# Patient Record
Sex: FEMALE | Race: BLACK OR AFRICAN AMERICAN | NOT HISPANIC OR LATINO | Employment: OTHER | URBAN - METROPOLITAN AREA
[De-identification: names, ages, dates, MRNs, and addresses within clinical notes are randomized per-mention and may not be internally consistent; named-entity substitution may affect disease eponyms.]

---

## 2017-01-10 ENCOUNTER — HOSPITAL ENCOUNTER (EMERGENCY)
Facility: OTHER | Age: 42
Discharge: HOME | End: 2017-01-10
Attending: EMERGENCY MEDICINE
Payer: MEDICARE

## 2017-01-10 VITALS
TEMPERATURE: 98 F | HEART RATE: 73 BPM | HEIGHT: 63 IN | WEIGHT: 136 LBS | SYSTOLIC BLOOD PRESSURE: 135 MMHG | DIASTOLIC BLOOD PRESSURE: 81 MMHG | BODY MASS INDEX: 24.1 KG/M2 | RESPIRATION RATE: 16 BRPM | OXYGEN SATURATION: 98 %

## 2017-01-10 DIAGNOSIS — H53.9 VISUAL CHANGES: Primary | ICD-10-CM

## 2017-01-10 LAB
B-HCG UR QL: NEGATIVE
CTP QC/QA: YES

## 2017-01-10 PROCEDURE — 81025 URINE PREGNANCY TEST: CPT | Performed by: EMERGENCY MEDICINE

## 2017-01-10 PROCEDURE — 99284 EMERGENCY DEPT VISIT MOD MDM: CPT | Mod: 25

## 2017-01-10 NOTE — ED AVS SNAPSHOT
[facility]  [address]  [facility] [address]               Babak Arevalo   1/10/2017  6:10 PM   ED    Description:  Female : 1975   Department:  [facility]           Your Care was Coordinated By:     Provider Role From To    Luanne Teixeira MD Attending Provider 01/10/17 1819 --    Cindy Ayoub PA-C Physician Assistant 01/10/17 1819 --      Reason for Visit     Eye Problem           Diagnoses this Visit        Comments    Visual changes    -  Primary       ED Disposition     None           To Do List           Follow-up Information     Follow up with Ophthamology  In 2 days.        Follow up with [facility].    Specialty:  Emergency Medicine    Why:  If symptoms worsen    Contact information:    [address]  [facility] [address]  [phone]      [facility]     [facility] -  Assistance  Registered nurses in the [facility] provide clinical advisement, health education, appointment booking, and other advisory services.  Call for this free service at [phone].             Medications           Message regarding Medications     Verify the changes and/or additions to your medication regime listed below are the same as discussed with your clinician today.  If any of these changes or additions are incorrect, please notify your healthcare provider.             Verify that the below list of medications is an accurate representation of the medications you are currently taking.  If none reported, the list may be blank. If incorrect, please contact your healthcare provider. Carry this list with you in case of emergency.           Current Medications     aspirin (ECOTRIN) 81 MG EC tablet Take 81 mg by mouth once daily.    hydroxychloroquine (PLAQUENIL) 200 mg tablet Take 200 mg by mouth once daily.    lisinopril 10 MG tablet Take 10 mg by mouth once daily.     "mycophenolate (CELLCEPT) 500 mg Tab Take by mouth 3 (three) times daily.    predniSONE (DELTASONE) 2.5 MG tablet Take 5 mg by mouth once daily.     fluticasone (CUTIVATE) 0.05 % cream Apply topically 2 (two) times daily. On face and neck.    hydrocortisone 1 % cream Apply to affected area 2 times daily    hydrOXYzine HCl (ATARAX) 25 MG tablet Take 1 tablet (25 mg total) by mouth 4 (four) times daily as needed for Itching.           Clinical Reference Information           Your Vitals Were     BP Pulse Temp Resp Height Weight    142/88 (BP Location: Left arm, Patient Position: Sitting) 64 98.5 °F (36.9 °C) (Oral) 17 5' 3" (1.6 m) 61.7 kg (136 lb)    SpO2 BMI             100% 24.09 kg/m2         Allergies as of 1/10/2017     No Known Allergies      Immunizations Administered on Date of Encounter - 1/10/2017     None      ED Micro, Lab, POCT     Start Ordered       Status Ordering Provider    01/10/17 1735 01/10/17 1734  POCT urine pregnancy  Once      Final result       ED Imaging Orders     Start Ordered       Status Ordering Provider    01/10/17 1917 01/10/17 1917  CT Head Without Contrast  1 time imaging      Final result       Discharge References/Attachments     VISION PROBLEMS, UNDERSTANDING (ENGLISH)    EYE, HOW IT WORKS (ENGLISH)      [facility] Sign-Up     Activating your [facility] account is as easy as 1-2-3!     1) Visit [email or web address], select Sign Up Now, enter this activation code and your date of birth, then select Next.  [email or web address]  Expires: 1/19/2017  3:49 PM      2) Create a username and password to use when you visit [facility] in the future and select a security question in case you lose your password and select Next.    3) Enter your e-mail address and click Sign Up!    Additional Information  If you have questions, please e-mail [email or web address] or call [phone] to talk to our [facility] staff. Remember, [facility] is NOT to be used for urgent needs. For medical emergencies, dial 911. "          [facility] complies with applicable Federal civil rights laws and does not discriminate on the basis of race, color, national origin, age, disability, or sex.        Language Assistance Services     ATTENTION: Language assistance services are available, free of charge. Please call [phone].      ATENCIÓN: Si habla español, tiene a trent disposición servicios gratuitos de asistencia lingüística. Llame al [phone].     CHÚ Ý: N?u b?n nói Ti?ng Vi?t, có các d?ch v? h? tr? ngôn ng? mi?n phí dành cho b?n. G?i s? [phone].

## 2017-01-11 NOTE — ED PROVIDER NOTES
"Encounter Date: 1/10/2017       History     Chief Complaint   Patient presents with    Eye Problem     pt states she is seeing spots and stars for a week straight. blurred vision. denies any other symptoms.      Review of patient's allergies indicates:  No Known Allergies  HPI Comments: Patient is a 41 y.o. female with a past medical history of HTN, SLE, presenting to the emergency department with complaints of vision changes.  The patient reports that she is "seeing stars all over for 2 weeks".  The patient reports that she sees stars in all of her vision, stating that there is no difference between her peripheral or central vision.  She denies recent head trauma or injury.  She denies pain with extraocular movement, sensitivity to light, headache.  She denies fever, chills, nausea, vomiting.  She denies use of contacts or glasses.  She states she has not taken any medication for symptoms thus far. She denies foreign body, denies pain.     The history is provided by the patient.     Past Medical History   Diagnosis Date    Disorder of kidney and ureter     Hypertension     SLE (systemic lupus erythematosus)      No past medical history pertinent negatives.  Past Surgical History   Procedure Laterality Date    Renal biopsy       History reviewed. No pertinent family history.  Social History   Substance Use Topics    Smoking status: Never Smoker    Smokeless tobacco: None    Alcohol use No     Review of Systems   Constitutional: Negative for activity change, appetite change, chills, fatigue and fever.   HENT: Negative for congestion, rhinorrhea, sinus pressure, sneezing, sore throat and trouble swallowing.    Eyes: Positive for visual disturbance. Negative for photophobia.   Respiratory: Negative for cough, chest tightness, shortness of breath and wheezing.    Cardiovascular: Negative for chest pain and palpitations.   Gastrointestinal: Negative for abdominal pain, constipation, diarrhea, nausea and vomiting. "   Genitourinary: Negative for dysuria, hematuria and urgency.   Musculoskeletal: Negative for back pain, myalgias, neck pain and neck stiffness.   Skin: Negative for color change and wound.   Neurological: Negative for dizziness, weakness, light-headedness, numbness and headaches.   Psychiatric/Behavioral: Negative for agitation and confusion.       Physical Exam   Initial Vitals   BP Pulse Resp Temp SpO2   01/10/17 1732 01/10/17 1732 01/10/17 1732 01/10/17 1732 01/10/17 1732   142/88 64 17 98.5 °F (36.9 °C) 100 %     Physical Exam    Nursing note and vitals reviewed.  Constitutional: She appears well-developed and well-nourished. She is not diaphoretic. She is cooperative.  Non-toxic appearance. She does not have a sickly appearance. She does not appear ill. No distress.   Well appearing, -American female unaccompanied in the emergency department.  She is smiling and laughing on exam, lying comfortably on the exam table.  She appears to be in no acute distress.   HENT:   Head: Normocephalic and atraumatic.   Right Ear: External ear normal.   Left Ear: External ear normal.   Nose: Nose normal.   Mouth/Throat: Oropharynx is clear and moist.   Eyes: Conjunctivae, EOM and lids are normal. Pupils are equal, round, and reactive to light.   Normal visual field, normal visual exam. No surrounding tenderness to palpation of the bilateral orbit, no ecchymosis, drainage, conjunctivitis.   Neck: Normal range of motion. Neck supple.   Cardiovascular: Normal rate, regular rhythm and normal heart sounds.   Pulmonary/Chest: Breath sounds normal. No respiratory distress. She has no wheezes. She has no rhonchi. She has no rales.   Abdominal: Soft. Bowel sounds are normal. She exhibits no distension. There is no tenderness. There is no rebound and no guarding.   Musculoskeletal: Normal range of motion.   Neurological: She is alert and oriented to person, place, and time.   Skin: Skin is warm.   Psychiatric: She has a normal  mood and affect. Her behavior is normal. Judgment and thought content normal.         ED Course   Procedures  Labs Reviewed   POCT URINE PREGNANCY        Imaging Results         CT Head Without Contrast (Final result) Result time:  01/10/17 19:52:20    Final result by Aiden Gomes MD (01/10/17 19:52:20)    Impression:       No acute intracranial abnormality.    Encephalomalacia of portions of the left frontal and parietal lobes with compensatory left lateral ventricular dilatation. This may be sequelae of remote infarct or trauma.      Electronically signed by: AIDEN GOMES MD  Date:     01/10/17  Time:    19:52     Narrative:    CT head without contrast    Clinical indication: Visual changes    Comparison: None available    Technique: 5-mm axial images of the head were performed without the administration of contrast.  Sagittal and coronal reformatted images were also obtained.    Findings:  No acute intracranial blood products.   No evidence of parenchymal mass.  No extra-axial masses or abnormal fluid collections are noted.    There is encephalomalacia of the posterior left frontal and left parietal lobes with compensatory dilatation of the left lateral ventricle. Few calcifications are noted within this region of encephalomalacia.    The visualized paranasal sinuses and mastoid air cells are clear.  The osseous structures show no evidence of fracture.  The surrounding soft tissues are unremarkable.                Medical Decision Making:   Clinical Tests:   Lab Tests: Ordered and Reviewed  The following lab test(s) were unremarkable: UPT  Radiological Study: Ordered and Reviewed  Other:   I have discussed this case with another health care provider.       <> Summary of the Discussion: Dr. Teixeira  This note was created using Dragon Medical Dictation. There may be typographical errors secondary to dictation.     Urgent evaluation of a 41 y.o. female with a past medical history of lupus, presenting to  "the emergency department complaining of vision changes. Patient is afebrile, nontoxic appearing and hemodynamically stable.  Physical exam reveals regular rate and rhythm, lungs are clear to auscultation bilaterally.  Normal extraocular movements, PERRLA.  No tenderness palpation, overlying skin changes of the bilateral periorbital region.  Visual acuity is assessed and is normal.  At this time, I do feel that the patient is stable for discharge home. Upon discussing this with her, she responded with, "well you should probably x-ray my head because if I leave and die, I'm gonna say it's your fault". Will obtain CT.   CT of the head shows no acute intracranial abnormalities.  Sequela of previous infarct seen, consistent with the patient's reported history of a previous CVA.  At this time, the patient stable for discharge.  I did recommend that she obtain follow-up with her ophthalmologist for further evaluation and management. The patient was instructed to follow up with a primary care provider in 2 days or to return to the emergency department for worsening symptoms. The treatment plan was discussed with the patient who demonstrated understanding and comfort with plan. The patient's history, physical exam, and plan of care was discussed with and agreed upon with my supervising physician.     Past Medical History   Diagnosis Date    Disorder of kidney and ureter     Hypertension     SLE (systemic lupus erythematosus)                      ED Course     Clinical Impression:     1. Visual changes       Disposition:   Disposition: Discharged  Condition: Stable       Cindy Ayoub PA-C  01/10/17 2243    "

## 2017-01-11 NOTE — ED TRIAGE NOTES
Patient c/o bilateral blurred vision with spots for several days.  Patient stated she has had episodes of feeling weak.

## 2017-01-22 ENCOUNTER — HOSPITAL ENCOUNTER (EMERGENCY)
Facility: OTHER | Age: 42
Discharge: HOME | End: 2017-01-22
Attending: EMERGENCY MEDICINE
Payer: MEDICARE

## 2017-01-22 VITALS
TEMPERATURE: 99 F | OXYGEN SATURATION: 98 % | SYSTOLIC BLOOD PRESSURE: 184 MMHG | DIASTOLIC BLOOD PRESSURE: 92 MMHG | HEIGHT: 63 IN | WEIGHT: 132 LBS | RESPIRATION RATE: 18 BRPM | HEART RATE: 85 BPM | BODY MASS INDEX: 23.39 KG/M2

## 2017-01-22 DIAGNOSIS — J06.9 VIRAL URI WITH COUGH: Primary | ICD-10-CM

## 2017-01-22 PROCEDURE — 99283 EMERGENCY DEPT VISIT LOW MDM: CPT

## 2017-01-22 RX ORDER — CETIRIZINE HYDROCHLORIDE 10 MG/1
10 TABLET ORAL DAILY
Qty: 30 TABLET | Refills: 0 | Status: ON HOLD | OUTPATIENT
Start: 2017-01-22 | End: 2021-04-03 | Stop reason: HOSPADM

## 2017-01-22 RX ORDER — FLUTICASONE PROPIONATE 50 MCG
2 SPRAY, SUSPENSION (ML) NASAL ONCE
Status: DISCONTINUED | OUTPATIENT
Start: 2017-01-22 | End: 2017-01-22

## 2017-01-22 RX ORDER — CETIRIZINE HYDROCHLORIDE 10 MG/1
10 TABLET ORAL ONCE
Status: DISCONTINUED | OUTPATIENT
Start: 2017-01-22 | End: 2017-01-22

## 2017-01-22 RX ORDER — FLUTICASONE PROPIONATE 50 MCG
1 SPRAY, SUSPENSION (ML) NASAL 2 TIMES DAILY PRN
Qty: 15 G | Refills: 0 | Status: ON HOLD | OUTPATIENT
Start: 2017-01-22 | End: 2021-04-03 | Stop reason: HOSPADM

## 2017-01-22 NOTE — ED AVS SNAPSHOT
[facility]  [address]  [place] [address]               Babak Arevalo   2017 10:09 AM   ED    Description:  Female : 1975   Department:  [facility]           Your Care was Coordinated By:     Provider Role From To    Odessa Benjamin MD Attending Provider 17 1626 --    Nara Goldsmith PA-C Physician Assistant 17 6947 --      Reason for Visit     Nasal Congestion           Diagnoses this Visit        Comments    Viral URI with cough    -  Primary       ED Disposition     None           To Do List           Follow-up Information     Follow up with [facility] In 2 days.    Why:  For symptom re-check.     Contact information:    [address]  [place] [address]  [phone]         These Medications        Disp Refills Start End    fluticasone (FLONASE) 50 mcg/actuation nasal spray 15 g 0 2017     1 spray by Each Nare route 2 (two) times daily as needed. - Each Nare    Pharmacy: [facility] - [address] Ph #: [phone]       benzocaine-menthol (CEPACOL SENSATIONS COOLING) 4-9 mg Lozg 20 lozenge 0 2017     1 lozenge by Mucous Membrane route 4 (four) times daily as needed. - Mucous Membrane    Pharmacy: [facility] - [address] Ph #: [phone]       cetirizine (ZYRTEC) 10 MG tablet 30 tablet 0 2017    Take 1 tablet (10 mg total) by mouth once daily. - Oral    [facility] [facility] [address] Ph #: [phone]         [facility]     [facility] Care [facility] -  Assistance  Registered nurses in the [facility] provide clinical advisement, health education, appointment booking, and other advisory services.  Call for this free service at [phone].             Medications           Message regarding  Medications     Verify the changes and/or additions to your medication regime listed below are the same as discussed with your clinician today.  If any of these changes or additions are incorrect, please notify your healthcare provider.        START taking these NEW medications        Refills    fluticasone (FLONASE) 50 mcg/actuation nasal spray 0    Si spray by Each Nare route 2 (two) times daily as needed.    Class: Print    Route: Each Nare    benzocaine-menthol (CEPACOL SENSATIONS COOLING) 4-9 mg Lozg 0    Si lozenge by Mucous Membrane route 4 (four) times daily as needed.    Class: Print    Route: Mucous Membrane    cetirizine (ZYRTEC) 10 MG tablet 0    Sig: Take 1 tablet (10 mg total) by mouth once daily.    Class: Print    Route: Oral           Verify that the below list of medications is an accurate representation of the medications you are currently taking.  If none reported, the list may be blank. If incorrect, please contact your healthcare provider. Carry this list with you in case of emergency.           Current Medications     aspirin (ECOTRIN) 81 MG EC tablet Take 81 mg by mouth once daily.    fluticasone (CUTIVATE) 0.05 % cream Apply topically 2 (two) times daily. On face and neck.    hydroxychloroquine (PLAQUENIL) 200 mg tablet Take 200 mg by mouth once daily.    hydrOXYzine HCl (ATARAX) 25 MG tablet Take 1 tablet (25 mg total) by mouth 4 (four) times daily as needed for Itching.    lisinopril 10 MG tablet Take 10 mg by mouth once daily.    mycophenolate (CELLCEPT) 500 mg Tab Take by mouth 3 (three) times daily.    predniSONE (DELTASONE) 2.5 MG tablet Take 5 mg by mouth once daily.     benzocaine-menthol (CEPACOL SENSATIONS COOLING) 4-9 mg Lozg 1 lozenge by Mucous Membrane route 4 (four) times daily as needed.    cetirizine (ZYRTEC) 10 MG tablet Take 1 tablet (10 mg total) by mouth once daily.    fluticasone (FLONASE) 50 mcg/actuation nasal spray 1 spray by Each Nare route 2 (two) times  "daily as needed.    hydrocortisone 1 % cream Apply to affected area 2 times daily           Clinical Reference Information           Your Vitals Were     BP Pulse Temp Resp Height Weight    184/92 (BP Location: Left arm, Patient Position: Sitting) 85 99 °F (37.2 °C) (Oral) 18 5' 3" (1.6 m) 59.9 kg (132 lb)    Last Period SpO2 BMI          01/17/2017 98% 23.38 kg/m2        Allergies as of 1/22/2017     No Known Allergies      Immunizations Administered on Date of Encounter - 1/22/2017     None      ED Micro, Lab, POCT     None      ED Imaging Orders     None        Discharge Instructions         Viral Upper Respiratory Illness (Adult)  You have a viral upper respiratory illness (URI), which is another term for the common cold. This illness is contagious during the first few days. It is spread through the air by coughing and sneezing. It may also be spread by direct contact (touching the sick person and then touching your own eyes, nose, or mouth). Frequent handwashing will decrease risk of spread. Most viral illnesses go away within 7 to 10 days with rest and simple home remedies. Sometimes the illness may last for several weeks. Antibiotics will not kill a virus, and they are generally not prescribed for this condition.    Home care  · If symptoms are severe, rest at home for the first 2 to 3 days. When you resume activity, don't let yourself get too tired.  · Avoid being exposed to cigarette smoke (yours or others).  · You may use acetaminophen or ibuprofen to control pain and fever, unless another medicine was prescribed. (Note: If you have chronic liver or kidney disease, have ever had a stomach ulcer or gastrointestinal bleeding, or are taking blood-thinning medicines, talk with your healthcare provider before using these medicines.) Aspirin should never be given to anyone under 18 years of age who is ill with a viral infection or fever. It may cause severe liver or brain damage.  · Your appetite may be poor, " so a light diet is fine. Avoid dehydration by drinking 6 to 8 glasses of fluids per day (water, soft drinks, juices, tea, or soup). Extra fluids will help loosen secretions in the nose and lungs.  · Over-the-counter cold medicines will not shorten the length of time youre sick, but they may be helpful for the following symptoms: cough, sore throat, and nasal and sinus congestion. (Note: Do not use decongestants if you have high blood pressure.)  Follow-up care  Follow up with your healthcare provider, or as advised.  When to seek medical advice  Call your healthcare provider right away if any of these occur:  · Cough with lots of colored sputum (mucus)  · Severe headache; face, neck, or ear pain  · Difficulty swallowing due to throat pain  · Fever of 100.4°F (38°C)  Call 911, or get immediate medical care  Call emergency services right away if any of these occur:  · Chest pain, shortness of breath, wheezing, or difficulty breathing  · Coughing up blood  · Inability to swallow due to throat pain  © 2745-3069 [employer]. [address] [place] [address]. All rights reserved. This information is not intended as a substitute for professional medical care. Always follow your healthcare professional's instructions.          [facility] Sign-Up     Activating your [facility] account is as easy as 1-2-3!     1) Visit [email or web address], select Sign Up Now, enter this activation code and your date of birth, then select Next.  [email or web address]  Expires: 3/8/2017 10:40 AM      2) Create a username and password to use when you visit [facility] in the future and select a security question in case you lose your password and select Next.    3) Enter your e-mail address and click Sign Up!    Additional Information  If you have questions, please e-mail [email or web address] or call [phone] to talk to our [facility] staff. Remember, [facility] is NOT to be used for urgent needs. For medical emergencies, dial 911.           [facility] complies with applicable Federal civil rights laws and does not discriminate on the basis of race, color, national origin, age, disability, or sex.        Language Assistance Services     ATTENTION: Language assistance services are available, free of charge. Please call [phone].      ATENCIÓN: Si habla español, tiene a trent disposición servicios gratuitos de asistencia lingüística. Llame al [phone].     CHÚ Ý: N?u b?n nói Ti?ng Vi?t, có các d?ch v? h? tr? ngôn ng? mi?n phí dành cho b?n. G?i s? [phone].

## 2017-01-22 NOTE — DISCHARGE INSTRUCTIONS
Viral Upper Respiratory Illness (Adult)  You have a viral upper respiratory illness (URI), which is another term for the common cold. This illness is contagious during the first few days. It is spread through the air by coughing and sneezing. It may also be spread by direct contact (touching the sick person and then touching your own eyes, nose, or mouth). Frequent handwashing will decrease risk of spread. Most viral illnesses go away within 7 to 10 days with rest and simple home remedies. Sometimes the illness may last for several weeks. Antibiotics will not kill a virus, and they are generally not prescribed for this condition.    Home care  · If symptoms are severe, rest at home for the first 2 to 3 days. When you resume activity, don't let yourself get too tired.  · Avoid being exposed to cigarette smoke (yours or others).  · You may use acetaminophen or ibuprofen to control pain and fever, unless another medicine was prescribed. (Note: If you have chronic liver or kidney disease, have ever had a stomach ulcer or gastrointestinal bleeding, or are taking blood-thinning medicines, talk with your healthcare provider before using these medicines.) Aspirin should never be given to anyone under 18 years of age who is ill with a viral infection or fever. It may cause severe liver or brain damage.  · Your appetite may be poor, so a light diet is fine. Avoid dehydration by drinking 6 to 8 glasses of fluids per day (water, soft drinks, juices, tea, or soup). Extra fluids will help loosen secretions in the nose and lungs.  · Over-the-counter cold medicines will not shorten the length of time youre sick, but they may be helpful for the following symptoms: cough, sore throat, and nasal and sinus congestion. (Note: Do not use decongestants if you have high blood pressure.)  Follow-up care  Follow up with your healthcare provider, or as advised.  When to seek medical advice  Call your healthcare provider right away if any  of these occur:  · Cough with lots of colored sputum (mucus)  · Severe headache; face, neck, or ear pain  · Difficulty swallowing due to throat pain  · Fever of 100.4°F (38°C)  Call 911, or get immediate medical care  Call emergency services right away if any of these occur:  · Chest pain, shortness of breath, wheezing, or difficulty breathing  · Coughing up blood  · Inability to swallow due to throat pain  © 1884-8381 [employer]. [address] [place] [address], [place] [address]. All rights reserved. This information is not intended as a substitute for professional medical care. Always follow your healthcare professional's instructions.

## 2017-01-22 NOTE — ED PROVIDER NOTES
Encounter Date: 1/22/2017       History     Chief Complaint   Patient presents with    Nasal Congestion     pt reports nasal congestion that has been going on for the past 2 weeks; productive cough with yellow sputum; sneezing     Review of patient's allergies indicates:  No Known Allergies  HPI Comments: Patient is 41-year-old female history of lupus who presents with complaint of nasal congestion, sore throat, productive cough that is been present for approximately 2 weeks prior to arrival.  Patient reports no relief from home Robitussin and NyQuil.  She has not been taking any other medications to help with symptoms.  She denies fevers, chest pain, shortness of breath, dizziness, altered mental status, sick contacts or recent travel.  She is currently unaccompanied in the ear.    The history is provided by the patient.     Past Medical History   Diagnosis Date    Disorder of kidney and ureter     Hypertension     SLE (systemic lupus erythematosus)      No past medical history pertinent negatives.  Past Surgical History   Procedure Laterality Date    Renal biopsy       History reviewed. No pertinent family history.  Social History   Substance Use Topics    Smoking status: Never Smoker    Smokeless tobacco: None    Alcohol use No     Review of Systems   Constitutional: Negative for chills and fever.   HENT: Positive for congestion and sore throat. Negative for trouble swallowing.    Eyes: Negative for visual disturbance.   Respiratory: Positive for cough. Negative for shortness of breath.    Cardiovascular: Negative for chest pain.   Gastrointestinal: Negative for abdominal pain, constipation, diarrhea, nausea and vomiting.   Genitourinary: Negative for dysuria and flank pain.   Musculoskeletal: Negative for back pain, neck pain and neck stiffness.   Skin: Negative for rash.   Neurological: Negative for dizziness, syncope, weakness and headaches.   Psychiatric/Behavioral: Negative for confusion.        Physical Exam   Initial Vitals   BP Pulse Resp Temp SpO2   01/22/17 1005 01/22/17 1005 01/22/17 1005 01/22/17 1005 01/22/17 1005   184/92 85 18 99 °F (37.2 °C) 98 %     Physical Exam    Vitals reviewed.  Constitutional: She appears well-developed and well-nourished. She is not diaphoretic. No distress.   Healthy-appearing -American female in no acute distress her apparent pain.  She speaks in clear full sentences, found safely congested and speaking, makes good eye contact and is cooperative.  There is no coughing during interview or exam.   HENT:   Head: Normocephalic and atraumatic.   Eyes: Conjunctivae and EOM are normal. Pupils are equal, round, and reactive to light. Right eye exhibits no discharge. Left eye exhibits no discharge. No scleral icterus.   Neck: Normal range of motion. Neck supple.   Cardiovascular: Normal rate, regular rhythm and normal heart sounds. Exam reveals no gallop and no friction rub.    No murmur heard.  Pulmonary/Chest: Breath sounds normal. She has no wheezes. She has no rhonchi. She has no rales.   Clear lungs auscultation bilaterally   Abdominal: Soft. Bowel sounds are normal. There is no tenderness. There is no rebound and no guarding.   Musculoskeletal: Normal range of motion. She exhibits no edema or tenderness.   Lymphadenopathy:     She has no cervical adenopathy.   Neurological: She is alert and oriented to person, place, and time. She has normal strength. No cranial nerve deficit or sensory deficit.   Skin: Skin is warm and dry. No rash and no abscess noted. No erythema. No pallor.   Psychiatric: She has a normal mood and affect. Her behavior is normal. Thought content normal.         ED Course   Procedures  Labs Reviewed - No data to display          Medical Decision Making:   ED Management:  Urgent evaluation of 41-year-old female who presents with complaints most consistent with viral URI and rhinitis.  Patient is afebrile, nontoxic appearing, hemodynamically  stable.  Physical exam reveals clear nasal mucus in bilateral nares with no significant findings for bacterial HEENT infection including acute otitis media, peritonsillar abscess, retropharyngeal abscess, strep pharyngitis, meningitis.  Also, do not suspect acute bacterial sinusitis.  No antibiotics are warranted at this time.  Patient is not currently optimized on over-the-counter symptom management.  Will encourage use of Cepacol, menthol lozenges, zyrtec.  Encouraged to push fluids, rest, follow-up with primary care provider in 1-2 days for symptom recheck.  She is amenable to plan.  Case discussed with attending who agrees with plan.                   ED Course     Clinical Impression:   The encounter diagnosis was Viral URI with cough.          Nara Goldsmith PA-C  01/22/17 105

## 2017-05-03 ENCOUNTER — HOSPITAL ENCOUNTER (EMERGENCY)
Facility: OTHER | Age: 42
Discharge: HOME | End: 2017-05-03
Attending: EMERGENCY MEDICINE
Payer: COMMERCIAL

## 2017-05-03 VITALS
HEART RATE: 101 BPM | WEIGHT: 132 LBS | OXYGEN SATURATION: 99 % | SYSTOLIC BLOOD PRESSURE: 162 MMHG | TEMPERATURE: 99 F | HEIGHT: 63 IN | DIASTOLIC BLOOD PRESSURE: 97 MMHG | BODY MASS INDEX: 23.39 KG/M2 | RESPIRATION RATE: 18 BRPM

## 2017-05-03 DIAGNOSIS — R42 VERTIGO: Primary | ICD-10-CM

## 2017-05-03 DIAGNOSIS — I10 ESSENTIAL HYPERTENSION: ICD-10-CM

## 2017-05-03 LAB
B-HCG UR QL: NEGATIVE
CTP QC/QA: YES

## 2017-05-03 PROCEDURE — 99284 EMERGENCY DEPT VISIT MOD MDM: CPT | Mod: 25

## 2017-05-03 PROCEDURE — 81025 URINE PREGNANCY TEST: CPT | Performed by: EMERGENCY MEDICINE

## 2017-05-03 RX ORDER — ONDANSETRON 4 MG/1
4 TABLET, FILM COATED ORAL EVERY 6 HOURS
Qty: 21 TABLET | Refills: 0 | Status: ON HOLD | OUTPATIENT
Start: 2017-05-03 | End: 2021-04-03 | Stop reason: HOSPADM

## 2017-05-03 NOTE — ED PROVIDER NOTES
"Encounter Date: 5/3/2017    SCRIBE #1 NOTE: I, Ellemile Tse, am scribing for, and in the presence of, Dr. Benjamin.       History     Chief Complaint   Patient presents with    Dizziness     Pt Co intermittent dizziness since March.     Review of patient's allergies indicates:  No Known Allergies  HPI Comments: Time seen by provider: 5:07 PM    This is a 41 y.o. female who presents with complaint of intermittent dizziness that has persisted for the past 2 months.  The patient states that the episodes have gradually increased in frequency and are nearly every day now.  She describes her discomfort as a "room-spinning" sensation.  She also endorses hot flashes, tinnitus, and nausea.  She mentions no fever, chills, congestion, rhinorrhea, vomiting, or HA.  The patient states that her symptoms are not exacerbated by sudden movements.  She reports no other identifying, alleviating, or exacerbating factors.  She admits that she has been seen at this facility and [facility] for the same complaint.  The patient claims that she was prescribed medication in March, which has provided no relief.  She reports history of HTN, CVA, and SLE.  She claims that she does not take prescription blood thinners.  She states that she has not seen a neurologist.  The patient admits that she does not have a PCP.       The history is provided by the patient.     Past Medical History:   Diagnosis Date    Disorder of kidney and ureter     Hypertension     SLE (systemic lupus erythematosus)      Past Surgical History:   Procedure Laterality Date    RENAL BIOPSY       History reviewed. No pertinent family history.  Social History   Substance Use Topics    Smoking status: Never Smoker    Smokeless tobacco: None    Alcohol use No     Review of Systems   Constitutional: Negative for activity change, appetite change, chills, diaphoresis and fever.        Positive for hot flashes.   HENT: Positive for tinnitus. Negative for congestion, sore throat " and trouble swallowing.    Eyes: Negative for photophobia and visual disturbance.   Respiratory: Negative for cough, chest tightness and shortness of breath.    Cardiovascular: Negative for chest pain.   Gastrointestinal: Positive for nausea. Negative for abdominal pain and vomiting.   Endocrine: Negative for polydipsia and polyuria.   Genitourinary: Negative for difficulty urinating and flank pain.   Musculoskeletal: Negative for back pain and neck pain.   Skin: Negative for rash.   Neurological: Positive for dizziness. Negative for weakness and headaches.   Psychiatric/Behavioral: Negative for confusion.       Physical Exam   Initial Vitals   BP Pulse Resp Temp SpO2   05/03/17 1628 05/03/17 1628 05/03/17 1628 05/03/17 1628 05/03/17 1628   162/97 101 18 98.9 °F (37.2 °C) 99 %     Physical Exam    Nursing note and vitals reviewed.  Constitutional: She appears well-developed and well-nourished. She is cooperative.  Non-toxic appearance. No distress.   HENT:   Head: Normocephalic and atraumatic.   Right Ear: Tympanic membrane normal.   Left Ear: Tympanic membrane normal.   Mouth/Throat: Oropharynx is clear and moist.   TM normal bilaterally.   Eyes: Conjunctivae and EOM are normal. Pupils are equal, round, and reactive to light.   No nystagmus on lateral gaze.   Neck: Normal range of motion and full passive range of motion without pain. Neck supple. No thyromegaly present. No JVD present.   Cardiovascular: Normal rate, regular rhythm, normal heart sounds and normal pulses.   Pulmonary/Chest: Effort normal and breath sounds normal. No respiratory distress.   Abdominal: Soft. Normal appearance and bowel sounds are normal. She exhibits no distension and no mass. There is no tenderness.   Musculoskeletal: Normal range of motion.   Neurological: She is alert and oriented to person, place, and time. She has normal strength. No cranial nerve deficit or sensory deficit.   CN II-XII intact. No facial droop. Symptoms elicited  with Shilpi-Hallpike test.   Skin: Skin is warm, dry and intact. No rash noted.   Psychiatric: She has a normal mood and affect. Her speech is normal and behavior is normal. Judgment and thought content normal.         ED Course   Procedures  Labs Reviewed   POCT URINE PREGNANCY     Imaging Results     None                  Medical Decision Making:   Initial Assessment:   Urgent evaluation of 41-year-old female with Lupus- on chronic steroids, prior CVA- on asa here with complaint of vertigo x months.  Patient endorses being evaluated here, as well as [facility] with diagnosis of bppv/labrinthitis given meclizine, which improves symptoms that then return after wearing off.  Patient denies head trauma, no fevers, no recent illness.  Patient endorses associated nausea, without vomiting.  Suspect patient's symptoms are either peripheral, or subacute central pathology to posterior circulation. Epley maneuver attempted with mild relief.  Given prolonged symptoms, we'll encourage follow-up with neurology for MRI and dc with additional antiemetics. Also discussed need for fu PMD given elevated bp, but not consistent with acute encephalopathy.  Clinical Tests:   Lab Tests: Ordered and Reviewed            Scribe Attestation:   Scribe #1: I performed the above scribed service and the documentation accurately describes the services I performed. I attest to the accuracy of the note.    Attending Attestation:           Physician Attestation for Scribe:  Physician Attestation Statement for Scribe #1: I, Dr. Benjamin, reviewed documentation, as scribed by Elle Tse in my presence, and it is both accurate and complete.                 ED Course     Clinical Impression:     1. Vertigo    2. Essential hypertension          Disposition:   Disposition: Discharged  Condition: Corrie Benjamin MD  05/03/17 5224

## 2017-05-03 NOTE — ED AVS SNAPSHOT
[facility]  [address]  [facility] [address]               Babak Arevalo   5/3/2017  4:32 PM   ED    Description:  Female : 1975   Department:  [facility]           Your Care was Coordinated By:     Provider Role From To    Odessa Benjamin MD Attending Provider 17 [id number] --      Reason for Visit     Dizziness           Diagnoses this Visit        Comments    Vertigo    -  Primary     Essential hypertension           ED Disposition     ED Disposition Condition Comment    Discharge             To Do List           Follow-up Information     Schedule an appointment as soon as possible for a visit with Dennis Lawler MD.    Specialty:  Neurology    Contact information:    [address]  [address]  [facility] [address]  [phone]         These Medications        Disp Refills Start End    ondansetron (ZOFRAN) 4 MG tablet 21 tablet 0 5/3/2017     Take 1 tablet (4 mg total) by mouth every 6 (six) hours. - Oral    Pharmacy: [facility] [address] - [address] #: [phone]         [facility]     [facility] Nurse Care Line -  Assistance  Unless otherwise directed by your provider, please contact [facility], our nurse care line that is available for  assistance.     Registered nurses in the [facility] provide: appointment scheduling, clinical advisement, health education, and other advisory services.  Call: [phone] (toll free)               Medications           Message regarding Medications     Verify the changes and/or additions to your medication regime listed below are the same as discussed with your clinician today.  If any of these changes or additions are incorrect, please notify your healthcare provider.        START taking these NEW medications        Refills    ondansetron (ZOFRAN) 4 MG tablet 0    Sig: Take 1 tablet (4 mg total) by mouth  "every 6 (six) hours.    Class: Print    Route: Oral           Verify that the below list of medications is an accurate representation of the medications you are currently taking.  If none reported, the list may be blank. If incorrect, please contact your healthcare provider. Carry this list with you in case of emergency.           Current Medications     aspirin (ECOTRIN) 81 MG EC tablet Take 81 mg by mouth once daily.    benzocaine-menthol (CEPACOL SENSATIONS COOLING) 4-9 mg Lozg 1 lozenge by Mucous Membrane route 4 (four) times daily as needed.    cetirizine (ZYRTEC) 10 MG tablet Take 1 tablet (10 mg total) by mouth once daily.    fluticasone (CUTIVATE) 0.05 % cream Apply topically 2 (two) times daily. On face and neck.    fluticasone (FLONASE) 50 mcg/actuation nasal spray 1 spray by Each Nare route 2 (two) times daily as needed.    hydrocortisone 1 % cream Apply to affected area 2 times daily    hydroxychloroquine (PLAQUENIL) 200 mg tablet Take 200 mg by mouth once daily.    hydrOXYzine HCl (ATARAX) 25 MG tablet Take 1 tablet (25 mg total) by mouth 4 (four) times daily as needed for Itching.    lisinopril 10 MG tablet Take 10 mg by mouth once daily.    mycophenolate (CELLCEPT) 500 mg Tab Take by mouth 3 (three) times daily.    ondansetron (ZOFRAN) 4 MG tablet Take 1 tablet (4 mg total) by mouth every 6 (six) hours.    predniSONE (DELTASONE) 2.5 MG tablet Take 5 mg by mouth once daily.            Clinical Reference Information           Your Vitals Were     BP Pulse Temp Resp Height Weight    162/97 (BP Location: Left arm, Patient Position: Sitting) 101 98.9 °F (37.2 °C) (Oral) 18 5' 3" (1.6 m) 59.9 kg (132 lb)    Last Period SpO2 BMI          04/03/2017 99% 23.38 kg/m2        Allergies as of 5/3/2017     No Known Allergies      Immunizations Administered on Date of Encounter - 5/3/2017     None      ED Micro, Lab, POCT     Start Ordered       Status Ordering Provider    05/03/17 1649 05/03/17 1648  POCT urine " pregnancy  Once      Final result       ED Imaging Orders     None      Discharge References/Attachments     EAR, INNER: CAUSES OF DIZZINESS (VERTIGO) (ENGLISH)    VERTIGO, UNSPECIFIED (ENGLISH)      [email or web address] Sign-Up     Activating your [facility] account is as easy as 1-2-3!     1) Visit [email or web address], select Sign Up Now, enter this activation code and your date of birth, then select Next.  [email or web address]  Expires: 6/17/2017  5:19 PM      2) Create a username and password to use when you visit [facility] in the future and select a security question in case you lose your password and select Next.    3) Enter your e-mail address and click Sign Up!    Additional Information  If you have questions, please e-mail [email or web address] or call [phone] to talk to our [facility] staff. Remember, [facility] is NOT to be used for urgent needs. For medical emergencies, dial 911.          [facility] complies with applicable Federal civil rights laws and does not discriminate on the basis of race, color, national origin, age, disability, or sex.        Language Assistance Services     ATTENTION: Language assistance services are available, free of charge. Please call [phone].      ATENCIÓN: Si habla español, tiene a trent disposición servicios gratuitos de asistencia lingüística. Llame al [phone].     CHÚ Ý: N?u b?n nói Ti?ng Vi?t, có các d?ch v? h? tr? ngôn ng? mi?n phí dành cho b?n. G?i s? [phone].

## 2017-05-03 NOTE — ED TRIAGE NOTES
"Pt complaining of "spinning" feeling in her head onset states she took a meclizine last night started feeling better but the feeling returned. Pt denies all other complaints.  States after the meds wear off it comes back.  Patient is AAOX x3 RR easy non labored NAD  No urinary complaints no abd complaints no other complaints   "

## 2017-05-05 ENCOUNTER — HOSPITAL ENCOUNTER (EMERGENCY)
Facility: OTHER | Age: 42
Discharge: HOME | End: 2017-05-05
Attending: EMERGENCY MEDICINE
Payer: MEDICARE

## 2017-05-05 VITALS
BODY MASS INDEX: 23.74 KG/M2 | HEART RATE: 88 BPM | HEIGHT: 63 IN | WEIGHT: 134 LBS | SYSTOLIC BLOOD PRESSURE: 143 MMHG | TEMPERATURE: 98 F | RESPIRATION RATE: 18 BRPM | DIASTOLIC BLOOD PRESSURE: 93 MMHG

## 2017-05-05 DIAGNOSIS — J02.9 PHARYNGITIS, UNSPECIFIED ETIOLOGY: Primary | ICD-10-CM

## 2017-05-05 PROCEDURE — 99283 EMERGENCY DEPT VISIT LOW MDM: CPT | Mod: 25

## 2017-05-05 PROCEDURE — 63600175 PHARM REV CODE 636 W HCPCS: Performed by: EMERGENCY MEDICINE

## 2017-05-05 PROCEDURE — 25000003 PHARM REV CODE 250: Performed by: EMERGENCY MEDICINE

## 2017-05-05 PROCEDURE — 96372 THER/PROPH/DIAG INJ SC/IM: CPT

## 2017-05-05 RX ORDER — HYDROCODONE BITARTRATE AND ACETAMINOPHEN 7.5; 325 MG/15ML; MG/15ML
10 SOLUTION ORAL
Status: COMPLETED | OUTPATIENT
Start: 2017-05-05 | End: 2017-05-05

## 2017-05-05 RX ORDER — AMOXICILLIN 500 MG/1
500 CAPSULE ORAL 3 TIMES DAILY
Qty: 21 CAPSULE | Refills: 0 | Status: SHIPPED | OUTPATIENT
Start: 2017-05-05 | End: 2017-05-12

## 2017-05-05 RX ORDER — AMOXICILLIN 250 MG/1
500 CAPSULE ORAL
Status: COMPLETED | OUTPATIENT
Start: 2017-05-05 | End: 2017-05-05

## 2017-05-05 RX ORDER — DEXAMETHASONE SODIUM PHOSPHATE 4 MG/ML
8 INJECTION, SOLUTION INTRA-ARTICULAR; INTRALESIONAL; INTRAMUSCULAR; INTRAVENOUS; SOFT TISSUE
Status: COMPLETED | OUTPATIENT
Start: 2017-05-05 | End: 2017-05-05

## 2017-05-05 RX ADMIN — HYDROCODONE BITARTRATE AND ACETAMINOPHEN 10 ML: 7.5; 325 SOLUTION ORAL at 06:05

## 2017-05-05 RX ADMIN — AMOXICILLIN 500 MG: 250 CAPSULE ORAL at 06:05

## 2017-05-05 RX ADMIN — DEXAMETHASONE SODIUM PHOSPHATE 8 MG: 4 INJECTION, SOLUTION INTRAMUSCULAR; INTRAVENOUS at 06:05

## 2017-05-05 NOTE — ED PROVIDER NOTES
Encounter Date: 5/5/2017    SCRIBE #1 NOTE: I, Alba Negro , am scribing for, and in the presence of, Dr. Underwood .       History     Chief Complaint   Patient presents with    Sore Throat     since yesterday     Review of patient's allergies indicates:  No Known Allergies  HPI Comments: Time seen by provider: 5:56 AM    This is a 41 y.o. female who presents with complaint of sore throat. Symptoms began last night. Onset of symptoms woke the patient up. Pt reports chills, voice change, trouble swallowing, and mild SOB, but denies fever, congestion, rhinorrhea, dental pain, cough, wheezing, nausea, vomiting, abdominal pain, myalgias, or weakness. Sore throat currently rates 9/10. Sore throat worsens with opening the mouth and swallowing. SOB became better with sitting up and is currently resolved. Pt reports daily use of Prednisone (5 mg) for lupus. She was seen in the ED two days ago for dizziness. Pt denies use of alcohol, tobacco, or illicit drugs.     The history is provided by the patient.     Past Medical History:   Diagnosis Date    Disorder of kidney and ureter     Hypertension     SLE (systemic lupus erythematosus)      Past Surgical History:   Procedure Laterality Date    RENAL BIOPSY       History reviewed. No pertinent family history.  Social History   Substance Use Topics    Smoking status: Never Smoker    Smokeless tobacco: None    Alcohol use No     Review of Systems   Constitutional: Positive for chills. Negative for fever.   HENT: Positive for sore throat, trouble swallowing and voice change. Negative for congestion, dental problem and rhinorrhea.    Eyes: Negative for redness and visual disturbance.   Respiratory: Positive for shortness of breath. Negative for cough and wheezing.    Cardiovascular: Negative for chest pain and palpitations.   Gastrointestinal: Negative for abdominal pain, diarrhea, nausea and vomiting.   Genitourinary: Negative for dysuria.   Musculoskeletal: Negative for  back pain and myalgias.   Skin: Negative for rash.   Neurological: Negative for weakness and headaches.   Psychiatric/Behavioral: Negative for confusion.       Physical Exam   Initial Vitals   BP Pulse Resp Temp SpO2   05/05/17 0549 05/05/17 0549 05/05/17 0549 05/05/17 0549 --   143/93 88 18 98.1 °F (36.7 °C)      Physical Exam    Nursing note and vitals reviewed.  Constitutional: She appears well-developed and well-nourished. She is not diaphoretic. She appears distressed.   HENT:   Head: Normocephalic and atraumatic.   Right Ear: External ear normal.   Left Ear: External ear normal.   Mouth/Throat: Uvula is midline. Posterior oropharyngeal erythema present.   Tonsils are slightly enlarged (right greater than left).    Eyes: Conjunctivae and EOM are normal. Pupils are equal, round, and reactive to light. Right eye exhibits no discharge. Left eye exhibits no discharge. No scleral icterus.   Neck: Normal range of motion. Neck supple.   Cardiovascular: Regular rhythm, normal heart sounds and intact distal pulses. Tachycardia present.  Exam reveals no gallop and no friction rub.    No murmur heard.  Pulmonary/Chest: Breath sounds normal. No stridor. No respiratory distress. She has no wheezes. She has no rhonchi. She has no rales.   Abdominal: Soft. She exhibits no distension. There is no tenderness. There is no rebound and no guarding.   Musculoskeletal: Normal range of motion. She exhibits no edema or tenderness.   Lymphadenopathy:     She has cervical adenopathy (bilaterally).   Neurological: She is alert and oriented to person, place, and time. She has normal strength. No cranial nerve deficit.   Skin: Skin is warm and dry. No rash noted. No erythema. No pallor.   Psychiatric: She has a normal mood and affect. Her behavior is normal. Judgment and thought content normal.         ED Course   Procedures  Labs Reviewed - No data to display          Medical Decision Making:   ED Management:  Emergent evaluation of  41-year-old female with complaint of sore throat times a few hours.  Vital signs reveal mild hypertension of which she has a history, afebrile.  Physical exam reveals erythema of the posterior oropharynx and some tonsillar enlargement, right side is slightly larger than the left, but uvula is midline and no hot potato voice or fever.  I do not think that there is a drainable peritonsillar abscess.  We'll treat as strep throat with amoxicillin as well as Decadron, she was given hydrocodone syrup in the emergency room for pain.  She is discharged in good condition with prescription for amoxicillin.            Scribe Attestation:   Scribe #1: I performed the above scribed service and the documentation accurately describes the services I performed. I attest to the accuracy of the note.    Attending Attestation:           Physician Attestation for Scribe:  Physician Attestation Statement for Scribe #1: I, Dr. Underwood , reviewed documentation, as scribed by Alba Negro  in my presence, and it is both accurate and complete.                 ED Course     Clinical Impression:     1. Pharyngitis, unspecified etiology                Kera Underwood MD  05/06/17 0764

## 2017-05-05 NOTE — ED AVS SNAPSHOT
[facility]  [address] [facility] [address]               Babak Arevalo   2017  5:52 AM   ED    Description:  Female : 1975   Department:  [facility]           Your Care was Coordinated By:     Provider Role From To    Kera Underwood MD Attending Provider 17 0556 --      Reason for Visit     Sore Throat           Diagnoses this Visit        Comments    Pharyngitis, unspecified etiology    -  Primary       ED Disposition     None           To Do List           Follow-up Information     Schedule an appointment as soon as possible for a visit with Saritha Of Amy.    Specialties:  Behavioral Health, Psychiatry    Why:  or your regular primary doctor if you have one    Contact information:    [address] [facility] [address]  [phone]          Follow up with [facility].    Specialty:  Emergency Medicine    Why:  As needed, If symptoms worsen    Contact information:    [address] [facility] [id number] [phone]       These Medications        Disp Refills Start End    amoxicillin (AMOXIL) 500 MG capsule 21 capsule 0 2017    Take 1 capsule (500 mg total) by mouth 3 (three) times daily. - Oral    Pharmacy: [facility] - [address] #: [phone]         [facility]     [facility] Nurse Care Line - 24/7 Assistance  Unless otherwise directed by your provider, please contact [facility], our nurse care line that is available for 24/7 assistance.     Registered nurses in the [facility] provide: appointment scheduling, clinical advisement, health education, and other advisory services.  Call: [phone] (toll free)               Medications           Message regarding Medications     Verify the changes and/or additions to your medication regime listed below are the same as discussed  with your clinician today.  If any of these changes or additions are incorrect, please notify your healthcare provider.        START taking these NEW medications        Refills    amoxicillin (AMOXIL) 500 MG capsule 0    Sig: Take 1 capsule (500 mg total) by mouth 3 (three) times daily.    Class: Print    Route: Oral      These medications were administered today        Dose Freq    dexamethasone injection 8 mg 8 mg ED 1 Time    Sig: Inject 2 mLs (8 mg total) into the muscle ED 1 Time.    Class: Normal    Route: Intramuscular    amoxicillin capsule 500 mg 500 mg ED 1 Time    Sig: Take 2 capsules (500 mg total) by mouth ED 1 Time.    Class: Normal    Route: Oral    hydrocodone-apap 2.5-108 MG/5 ML oral solution 10 mL 10 mL ED 1 Time    Sig: Take 10 mLs by mouth ED 1 Time.    Class: Normal    Route: Oral           Verify that the below list of medications is an accurate representation of the medications you are currently taking.  If none reported, the list may be blank. If incorrect, please contact your healthcare provider. Carry this list with you in case of emergency.           Current Medications     amoxicillin (AMOXIL) 500 MG capsule Take 1 capsule (500 mg total) by mouth 3 (three) times daily.    amoxicillin capsule 500 mg Take 2 capsules (500 mg total) by mouth ED 1 Time.    aspirin (ECOTRIN) 81 MG EC tablet Take 81 mg by mouth once daily.    benzocaine-menthol (CEPACOL SENSATIONS COOLING) 4-9 mg Lozg 1 lozenge by Mucous Membrane route 4 (four) times daily as needed.    cetirizine (ZYRTEC) 10 MG tablet Take 1 tablet (10 mg total) by mouth once daily.    dexamethasone injection 8 mg Inject 2 mLs (8 mg total) into the muscle ED 1 Time.    fluticasone (CUTIVATE) 0.05 % cream Apply topically 2 (two) times daily. On face and neck.    fluticasone (FLONASE) 50 mcg/actuation nasal spray 1 spray by Each Nare route 2 (two) times daily as needed.    hydrocodone-apap 2.5-108 MG/5 ML oral solution 10 mL Take 10 mLs by mouth  "ED 1 Time.    hydrocortisone 1 % cream Apply to affected area 2 times daily    hydroxychloroquine (PLAQUENIL) 200 mg tablet Take 200 mg by mouth once daily.    hydrOXYzine HCl (ATARAX) 25 MG tablet Take 1 tablet (25 mg total) by mouth 4 (four) times daily as needed for Itching.    lisinopril 10 MG tablet Take 10 mg by mouth once daily.    mycophenolate (CELLCEPT) 500 mg Tab Take by mouth 3 (three) times daily.    ondansetron (ZOFRAN) 4 MG tablet Take 1 tablet (4 mg total) by mouth every 6 (six) hours.    predniSONE (DELTASONE) 2.5 MG tablet Take 5 mg by mouth once daily.            Clinical Reference Information           Your Vitals Were     BP Pulse Temp Resp Height Weight    143/93 (BP Location: Right arm, Patient Position: Sitting) 88 98.1 °F (36.7 °C) (Oral) 18 5' 3" (1.6 m) 60.8 kg (134 lb)    Last Period BMI             04/03/2017 23.74 kg/m2         Allergies as of 5/5/2017     No Known Allergies      Immunizations Administered on Date of Encounter - 5/5/2017     None      ED Micro, Lab, POCT     None      ED Imaging Orders     None      Discharge References/Attachments     SORE THROATS, SELF-CARE FOR (ENGLISH)    SORE THROAT, WHEN YOU HAVE A (ENGLISH)      Your Scheduled Appointments     May 18, 2017  8:00 AM CDT   New Patient with Dennis Lawler MD   [facility] ([facility]    [address]  [place] [id number] [phone]              [facility] Sign-Up     Activating your [facility] account is as easy as 1-2-3!     1) Visit [email or web address], select Sign Up Now, enter this activation code and your date of birth, then select Next.  [email or web address]  Expires: 6/17/2017  5:19 PM      2) Create a username and password to use when you visit [facility] in the future and select a security question in case you lose your password and select Next.    3) Enter your e-mail address and click Sign Up!    Additional Information  If you have questions, please e-mail [email or web address] or " call [phone] to talk to our [facility] staff. Remember, [facility] is NOT to be used for urgent needs. For medical emergencies, dial 911.          [facility] complies with applicable Federal civil rights laws and does not discriminate on the basis of race, color, national origin, age, disability, or sex.        Language Assistance Services     ATTENTION: Language assistance services are available, free of charge. Please call [phone].      ATENCIÓN: Si habla beatrizañol, tiene a trent disposición servicios gratuitos de asistencia lingüística. Llame al [phone].     KRYSTAL Ý: N?u b?n nói Ti?ng Vi?t, có các d?ch v? h? tr? ngôn ng? mi?n phí dành cho b?n. G?i s? [phone].

## 2017-05-09 ENCOUNTER — OFFICE VISIT (OUTPATIENT)
Dept: NEUROLOGY | Facility: CLINIC | Age: 42
End: 2017-05-09
Payer: COMMERCIAL

## 2017-05-09 VITALS
SYSTOLIC BLOOD PRESSURE: 118 MMHG | HEIGHT: 63 IN | DIASTOLIC BLOOD PRESSURE: 82 MMHG | WEIGHT: 133.81 LBS | HEART RATE: 98 BPM | BODY MASS INDEX: 23.71 KG/M2

## 2017-05-09 DIAGNOSIS — I10 ESSENTIAL HYPERTENSION: ICD-10-CM

## 2017-05-09 DIAGNOSIS — Z86.73 HISTORY OF STROKE: ICD-10-CM

## 2017-05-09 DIAGNOSIS — R42 VERTIGO: ICD-10-CM

## 2017-05-09 DIAGNOSIS — M32.19 SYSTEMIC LUPUS ERYTHEMATOSUS WITH OTHER ORGAN INVOLVEMENT: ICD-10-CM

## 2017-05-09 PROBLEM — M32.9 SLE (SYSTEMIC LUPUS ERYTHEMATOSUS): Status: ACTIVE | Noted: 2017-05-09

## 2017-05-09 PROCEDURE — 99999 PR PBB SHADOW E&M-EST. PATIENT-LVL III: CPT | Mod: PBBFAC,,, | Performed by: PSYCHIATRY & NEUROLOGY

## 2017-05-09 PROCEDURE — 99204 OFFICE O/P NEW MOD 45 MIN: CPT | Mod: S$GLB,,, | Performed by: PSYCHIATRY & NEUROLOGY

## 2017-05-09 PROCEDURE — 99213 OFFICE O/P EST LOW 20 MIN: CPT | Mod: PBBFAC | Performed by: PSYCHIATRY & NEUROLOGY

## 2017-05-09 RX ORDER — MECLIZINE HYDROCHLORIDE 25 MG/1
TABLET ORAL
Refills: 0 | Status: ON HOLD | COMMUNITY
Start: 2017-03-27 | End: 2021-04-03 | Stop reason: HOSPADM

## 2017-05-09 RX ORDER — PREDNISONE 5 MG/1
5 TABLET ORAL DAILY
Refills: 0 | COMMUNITY
Start: 2017-04-30 | End: 2021-07-19 | Stop reason: SDUPTHER

## 2017-05-09 RX ORDER — IBUPROFEN 800 MG/1
800 TABLET ORAL EVERY 8 HOURS
Refills: 0 | Status: ON HOLD | COMMUNITY
Start: 2017-04-04 | End: 2021-04-03 | Stop reason: HOSPADM

## 2017-05-09 NOTE — PATIENT INSTRUCTIONS
Discussed with patient.  Her symptoms that are intermittent and may represent benign postural vertigo however given her vascular risk factors and prior history of stroke will get an MRI scan of the brain, noncontrast.  She is advised to continue with her rheumatologist and continue aspirin 81 mg daily.  She will be referred to ENT for an evaluation.

## 2017-05-09 NOTE — MR AVS SNAPSHOT
[facility]  [address]  [place] [address]  Phone: [phone]  Fax: [phone]                  Babak Arevalo   2017 10:00 AM   Office Visit    Description:  Female : 1975   Provider:  Dennis Lawler MD   Department:  [facility]           Reason for Visit     Dizziness           Diagnoses this Visit        Comments    History of stroke         Vertigo         Essential hypertension         Systemic lupus erythematosus with other organ involvement                To Do List           Future Appointments        Provider Department Dept Phone    5/10/2017 9:30 AM [facility] MRI1 [address] [facility] [phone]      Goals (5 Years of Data)     None      Follow-Up and Disposition     Return in about 4 weeks (around 2017).      [facility]     [facility] Nurse Care Line -  Assistance  Unless otherwise directed by your provider, please contact [facility], our nurse care line that is available for  assistance.     Registered nurses in the [facility] provide: appointment scheduling, clinical advisement, health education, and other advisory services.  Call: [phone] (toll free)               Medications           Message regarding Medications     Verify the changes and/or additions to your medication regime listed below are the same as discussed with your clinician today.  If any of these changes or additions are incorrect, please notify your healthcare provider.             Verify that the below list of medications is an accurate representation of the medications you are currently taking.  If none reported, the list may be blank. If incorrect, please contact your healthcare provider. Carry this list with you in case of emergency.           Current Medications     amoxicillin (AMOXIL) 500 MG capsule Take 1 capsule (500 mg total) by mouth 3 (three) times daily.    aspirin (ECOTRIN) 81 MG EC tablet Take 81 mg  "by mouth once daily.    benzocaine-menthol (CEPACOL SENSATIONS COOLING) 4-9 mg Lozg 1 lozenge by Mucous Membrane route 4 (four) times daily as needed.    cetirizine (ZYRTEC) 10 MG tablet Take 1 tablet (10 mg total) by mouth once daily.    fluticasone (CUTIVATE) 0.05 % cream Apply topically 2 (two) times daily. On face and neck.    fluticasone (FLONASE) 50 mcg/actuation nasal spray 1 spray by Each Nare route 2 (two) times daily as needed.    hydrocortisone 1 % cream Apply to affected area 2 times daily    hydroxychloroquine (PLAQUENIL) 200 mg tablet Take 200 mg by mouth once daily.    hydrOXYzine HCl (ATARAX) 25 MG tablet Take 1 tablet (25 mg total) by mouth 4 (four) times daily as needed for Itching.    ibuprofen (ADVIL,MOTRIN) 800 MG tablet Take 800 mg by mouth every 8 (eight) hours.    lisinopril 10 MG tablet Take 10 mg by mouth once daily.    meclizine (ANTIVERT) 25 mg tablet take 1 tablet by mouth three times a day if needed for UP TO 10 DAYS    mycophenolate (CELLCEPT) 500 mg Tab Take by mouth 3 (three) times daily.    ondansetron (ZOFRAN) 4 MG tablet Take 1 tablet (4 mg total) by mouth every 6 (six) hours.    predniSONE (DELTASONE) 5 MG tablet            Clinical Reference Information           Your Vitals Were     BP Pulse Height Weight Last Period BMI    118/82 (BP Location: Right arm, Patient Position: Sitting, BP Method: Automatic) 98 5' 3" (1.6 m) 60.7 kg (133 lb 13.1 oz) 04/03/2017 23.71 kg/m2      Blood Pressure          Most Recent Value    BP  118/82      Allergies as of 5/9/2017     No Known Allergies      Immunizations Administered on Date of Encounter - 5/9/2017     None      Orders Placed During Today's Visit      Normal Orders This Visit    Ambulatory Referral to ENT     Future Labs/Procedures Expected by Expires    MRI Brain Without Contrast  5/9/2017 5/9/2018      [facility] Sign-Up     Activating your [facility] account is as easy as 1-2-3!     1) Visit [email or web address], select Sign Up Now, enter " this activation code and your date of birth, then select Next.  [email or web address]  Expires: 6/17/2017  5:19 PM      2) Create a username and password to use when you visit [facility] in the future and select a security question in case you lose your password and select Next.    3) Enter your e-mail address and click Sign Up!    Additional Information  If you have questions, please e-mail [email or web address] or call [phone] to talk to our [facility] staff. Remember, [facility] is NOT to be used for urgent needs. For medical emergencies, dial 911.         Instructions    Discussed with patient.  Her symptoms that are intermittent and may represent benign postural vertigo however given her vascular risk factors and prior history of stroke will get an MRI scan of the brain, noncontrast.  She is advised to continue with her rheumatologist and continue aspirin 81 mg daily.  She will be referred to ENT for an evaluation.         Language Assistance Services     ATTENTION: Language assistance services are available, free of charge. Please call [phone].      ATENCIÓN: Si habla christian, tiene a trent disposición servicios gratuitos de asistencia lingüística. Llame al [phone].     KRYSTAL Ý: N?u b?n nói Ti?ng Vi?t, có các d?ch v? h? tr? ngôn ng? mi?n phí dành cho b?n. G?i s? [phone].         [facility] complies with applicable Federal civil rights laws and does not discriminate on the basis of race, color, national origin, age, disability, or sex.

## 2017-05-09 NOTE — PROGRESS NOTES
Subjective:       Patient ID: Babak Arevalo is a 41 y.o. female.    Chief Complaint:  Dizziness      History of Present Illness  HPI  This is a 41-year-old -American female who was referred for evaluation of intermittent dizziness that has been present for the last couple of months.  The dizziness is described since the episode of a spinning sensation but may last for about 5 minutes or less during which time she feels off balance and nauseous she denies any other focal neurological or visual symptoms.  She does report that laying down triggers the symptoms and she has a funny sensation primarily in the left ear.  She has been seen at the emergency room on several occasions but this.  She was recently treated for an acute pharyngitis.  She denies any hearing impairment.     The patient does have a history of systemic lupus erythematosus diagnosed in 2000 at which time she had an acute stroke with right-sided weakness.  She has minimal residual right-sided clumsiness usually noted on exertion or fatigue.  She is being followed by rheumatologist, Dr. Cerrato, and also reports having renal problems and is presently seeing a nephrologist.  She is on a baby aspirin daily.      Review of Systems  Review of Systems   HENT: Negative.  Negative for hearing loss.    Eyes: Negative.  Negative for visual disturbance.   Respiratory: Negative.  Negative for shortness of breath.    Cardiovascular: Negative.  Negative for chest pain and palpitations.   Gastrointestinal: Negative.    Genitourinary: Negative.    Musculoskeletal: Negative.  Negative for back pain, gait problem and neck pain.   Skin: Negative.    Neurological: Positive for dizziness and weakness. Negative for tremors, seizures, syncope, speech difficulty, numbness and headaches.   Psychiatric/Behavioral: Negative.  Negative for confusion and decreased concentration.       Objective:      Neurologic Exam     Mental Status   Oriented to person, place, and time.    Registration: recalls 3 of 3 objects. Follows 3 step commands.   Attention: normal. Concentration: normal.   Speech: speech is normal   Level of consciousness: alert  Knowledge: good.   Able to name object. Able to read. Able to repeat. Normal comprehension.     Cranial Nerves   Cranial nerves II through XII intact.     Motor Exam   Muscle bulk: normal  Overall muscle tone: normal    Strength   Strength 5/5 except as noted.        Minimal weakness of the right  otherwise nonfocal exam with no pronator drift noted.     Sensory Exam   Light touch normal.   Proprioception normal.   Pinprick normal.     Gait, Coordination, and Reflexes     Gait  Gait: normal    Coordination   Romberg: negative  Finger to nose coordination: normal    Tremor   Resting tremor: absent  Intention tremor: absent  Action tremor: absent    Reflexes   Right brachioradialis: 1+  Left brachioradialis: 1+  Right biceps: 2+  Left biceps: 1+  Right triceps: 2+  Left triceps: 1+  Right patellar: 2+  Left patellar: 1+  Right achilles: 1+  Left achilles: 1+  Right plantar: normal  Left plantar: normal       DD is noted to be slightly brisker on the right as compared to the left.       Physical Exam   Constitutional: She is oriented to person, place, and time. She appears well-developed and well-nourished.   HENT:   Head: Normocephalic and atraumatic.   Neck: Normal range of motion. Neck supple. Carotid bruit is not present.   Neurological: She is oriented to person, place, and time. She has a normal Finger-Nose-Finger Test and a normal Romberg Test. Gait normal.   Reflex Scores:       Tricep reflexes are 2+ on the right side and 1+ on the left side.       Bicep reflexes are 2+ on the right side and 1+ on the left side.       Brachioradialis reflexes are 1+ on the right side and 1+ on the left side.       Patellar reflexes are 2+ on the right side and 1+ on the left side.       Achilles reflexes are 1+ on the right side and 1+ on the left  side.  Psychiatric: Her speech is normal.   Vitals reviewed.        Assessment:        1. History of stroke    2. Vertigo    3. Essential hypertension    4. Systemic lupus erythematosus with other organ involvement            Plan:         Discussed with patient.  Her symptoms that are intermittent and may represent benign postural vertigo however given her vascular risk factors and prior history of stroke will get an MRI scan of the brain, noncontrast.  She is advised to continue with her rheumatologist and continue aspirin 81 mg daily.  She will be referred to ENT for an evaluation.  Follow-up in one month.

## 2017-05-09 NOTE — LETTER
May 9, 2017      Odessa Benjamin MD  [address] [place] [address]           [facility]  [address] [place] [address] [place] [address]  Phone: [phone]  Fax: [phone]          Patient: Babak Arevalo   MR Number: [id number]   YOB: 1975   Date of Visit: 5/9/2017       Dear Dr. Odessa Benjamin:    Thank you for referring Babak Arevalo to me for evaluation. Attached you will find relevant portions of my assessment and plan of care.    If you have questions, please do not hesitate to call me. I look forward to following Babak Arevalo along with you.    Sincerely,    Dennis Lawler MD    Enclosure  CC:  No Recipients    If you would like to receive this communication electronically, please contact [email or web address] or [phone] to request more information on [facility] [employer] access.    For providers and/or their staff who would like to refer a patient to [facility], please contact us through our one-stop-shop provider referral line, [facility], at [phone].    If you feel you have received this communication in error or would no longer like to receive these types of communications, please e-mail [email or web address]

## 2017-05-10 ENCOUNTER — HOSPITAL ENCOUNTER (OUTPATIENT)
Dept: RADIOLOGY | Facility: OTHER | Age: 42
Discharge: HOME | End: 2017-05-10
Attending: PSYCHIATRY & NEUROLOGY
Payer: COMMERCIAL

## 2017-05-10 DIAGNOSIS — Z86.73 HISTORY OF STROKE: ICD-10-CM

## 2017-05-10 DIAGNOSIS — R42 VERTIGO: ICD-10-CM

## 2017-05-10 PROCEDURE — 70551 MRI BRAIN STEM W/O DYE: CPT | Mod: 26,,, | Performed by: RADIOLOGY

## 2017-05-10 PROCEDURE — 70551 MRI BRAIN STEM W/O DYE: CPT | Mod: TC

## 2017-05-11 ENCOUNTER — TELEPHONE (OUTPATIENT)
Dept: NEUROLOGY | Facility: CLINIC | Age: 42
End: 2017-05-11

## 2017-05-11 DIAGNOSIS — R93.0 ABNORMAL MRI OF HEAD: Primary | ICD-10-CM

## 2017-05-11 DIAGNOSIS — Z86.73 HISTORY OF STROKE: ICD-10-CM

## 2017-05-11 NOTE — TELEPHONE ENCOUNTER
Discussed with patient regarding the results of the MRI scan which do show an old stroke as well as small dilatation suggestive of a small aneurysm.  Suggested CTA which we will schedule.

## 2017-05-16 ENCOUNTER — HOSPITAL ENCOUNTER (OUTPATIENT)
Dept: RADIOLOGY | Facility: OTHER | Age: 42
Discharge: HOME | End: 2017-05-16
Attending: PSYCHIATRY & NEUROLOGY
Payer: COMMERCIAL

## 2017-05-16 DIAGNOSIS — R93.0 ABNORMAL MRI OF HEAD: ICD-10-CM

## 2017-05-16 DIAGNOSIS — Z86.73 HISTORY OF STROKE: ICD-10-CM

## 2017-05-16 PROCEDURE — 70496 CT ANGIOGRAPHY HEAD: CPT | Mod: 26,,, | Performed by: RADIOLOGY

## 2017-05-16 PROCEDURE — 25500020 PHARM REV CODE 255: Performed by: PSYCHIATRY & NEUROLOGY

## 2017-05-16 PROCEDURE — 70496 CT ANGIOGRAPHY HEAD: CPT | Mod: TC

## 2017-05-16 RX ADMIN — IOHEXOL 75 ML: 350 INJECTION, SOLUTION INTRAVENOUS at 09:05

## 2017-05-23 ENCOUNTER — TELEPHONE (OUTPATIENT)
Dept: NEUROLOGY | Facility: CLINIC | Age: 42
End: 2017-05-23

## 2017-05-23 NOTE — TELEPHONE ENCOUNTER
Patient given results of the CTA which showed no evidence of an aneurysm.  It did show evidence of the old stroke.

## 2017-05-23 NOTE — TELEPHONE ENCOUNTER
----- Message from Ashley Perez sent at 5/23/2017  9:21 AM CDT -----  Contact: Self  x  1st Request  _  2nd Request  _  3rd Request        Who: PASHA SANTAMARIA [[id number]]    Why: Pt states she is calling for her CT and MRI results. Please call, thanks!    What Number to Call Back: [phone]    When to Expect a call back: (Before the end of the day)   -- if the call is after 12:00, the call back will be tomorrow.

## 2017-06-13 ENCOUNTER — OFFICE VISIT (OUTPATIENT)
Dept: NEUROLOGY | Facility: CLINIC | Age: 42
End: 2017-06-13
Payer: COMMERCIAL

## 2017-06-13 VITALS
DIASTOLIC BLOOD PRESSURE: 78 MMHG | WEIGHT: 129.63 LBS | HEART RATE: 76 BPM | HEIGHT: 63 IN | SYSTOLIC BLOOD PRESSURE: 114 MMHG | BODY MASS INDEX: 22.97 KG/M2

## 2017-06-13 DIAGNOSIS — M32.19 SYSTEMIC LUPUS ERYTHEMATOSUS WITH OTHER ORGAN INVOLVEMENT: ICD-10-CM

## 2017-06-13 DIAGNOSIS — R42 VERTIGO: Primary | ICD-10-CM

## 2017-06-13 DIAGNOSIS — Z86.73 HISTORY OF STROKE: ICD-10-CM

## 2017-06-13 PROCEDURE — 99999 PR PBB SHADOW E&M-EST. PATIENT-LVL III: CPT | Mod: PBBFAC,,, | Performed by: PSYCHIATRY & NEUROLOGY

## 2017-06-13 PROCEDURE — 99213 OFFICE O/P EST LOW 20 MIN: CPT | Mod: PBBFAC | Performed by: PSYCHIATRY & NEUROLOGY

## 2017-06-13 PROCEDURE — 99214 OFFICE O/P EST MOD 30 MIN: CPT | Mod: S$GLB,,, | Performed by: PSYCHIATRY & NEUROLOGY

## 2017-06-13 NOTE — PROGRESS NOTES
Subjective:       Patient ID: Babak Arevalo is a 41 y.o. female.    Chief Complaint:  Stroke and Dizziness      History of Present Illness  HPI  This is a 41-year-old -American female who had been seen by me for evaluation of intermittent dizziness that has been present for the last couple of months.  The dizziness is described since the episode of a spinning sensation but may last for about 5 minutes or less during which time she feels off balance and nauseous she denies any other focal neurological or visual symptoms.  She does report that laying down triggers the symptoms and she has a funny sensation primarily in the left ear.  She has been seen at the emergency room on several occasions but this.  She denies any hearing impairment.  The symptoms have significantly improved since last visit to see me however past couple of weeks she has had no dizziness.    The patient does have a history of systemic lupus erythematosus diagnosed in 2000 at which time she had an acute stroke with right-sided weakness.  She has minimal residual right-sided clumsiness usually noted on exertion or fatigue.  She is being followed by rheumatologist, Dr. Cerrato, and also reports having renal problems and is presently seeing a nephrologist.  She is on a baby aspirin daily.      She had an MRI scan of the brain done in early May 2017 which showed evidence of a large area of encephalomalacia in the left cerebral hemisphere consistent with remote infarction.  No evidence of acute intracranial abnormality.  T2 flow voids demonstrated a 4 mm outpouching at the level of the carotid terminus on the left.  This could represent a tortuous vessel although it would be difficult to exclude an aneurysm.  Comparison could be made to outside imaging if available.  Otherwise, MRA of the brain without contrast or CTA is recommended.  A CTA of the head was subsequently done and this was unremarkable showing no evidence of an aneurysm or other  vascular anomaly. The abnormality seen on recent MRI relates to a tortuous proximal left MCA and large tortuous left AMADOU. There is no evidence of left carotid terminus aneurysm. There is tortuous vascularity with mild calcific atherosclerosis of the cavernous portions of the internal carotid arteries bilaterally, no hemodynamically significant stenosis.      Review of Systems  Review of Systems   HENT: Negative.  Negative for hearing loss.    Eyes: Negative.  Negative for visual disturbance.   Respiratory: Negative.  Negative for shortness of breath.    Cardiovascular: Negative.  Negative for chest pain and palpitations.   Gastrointestinal: Negative.    Genitourinary: Negative.    Musculoskeletal: Negative.  Negative for back pain, gait problem and neck pain.   Skin: Negative.    Neurological: Positive for dizziness and weakness. Negative for tremors, seizures, syncope, speech difficulty, numbness and headaches.   Psychiatric/Behavioral: Negative.  Negative for confusion and decreased concentration.       Objective:      Neurologic Exam     Mental Status   Oriented to person, place, and time.   Registration: recalls 3 of 3 objects. Follows 3 step commands.   Attention: normal. Concentration: normal.   Speech: speech is normal   Level of consciousness: alert  Knowledge: good.   Able to name object. Able to read. Able to repeat. Normal comprehension.     Cranial Nerves   Cranial nerves II through XII intact.     Motor Exam   Muscle bulk: normal  Overall muscle tone: normal    Strength   Strength 5/5 except as noted. Minimal weakness of the right  otherwise nonfocal exam with no pronator drift noted.     Sensory Exam   Light touch normal.   Proprioception normal.   Pinprick normal.     Gait, Coordination, and Reflexes     Gait  Gait: normal    Coordination   Romberg: negative  Finger to nose coordination: normal    Tremor   Resting tremor: absent  Intention tremor: absent  Action tremor: absent    Reflexes   Right  brachioradialis: 1+  Left brachioradialis: 1+  Right biceps: 2+  Left biceps: 1+  Right triceps: 2+  Left triceps: 1+  Right patellar: 2+  Left patellar: 1+  Right achilles: 1+  Left achilles: 1+  Right plantar: normal  Left plantar: normalDD is noted to be slightly brisker on the right as compared to the left.       Physical Exam   Constitutional: She is oriented to person, place, and time. She appears well-developed and well-nourished.   HENT:   Head: Normocephalic and atraumatic.   Neck: Normal range of motion. Neck supple. Carotid bruit is not present.   Neurological: She is oriented to person, place, and time. She has a normal Finger-Nose-Finger Test and a normal Romberg Test. Gait normal.   Reflex Scores:       Tricep reflexes are 2+ on the right side and 1+ on the left side.       Bicep reflexes are 2+ on the right side and 1+ on the left side.       Brachioradialis reflexes are 1+ on the right side and 1+ on the left side.       Patellar reflexes are 2+ on the right side and 1+ on the left side.       Achilles reflexes are 1+ on the right side and 1+ on the left side.  Psychiatric: Her speech is normal.   Vitals reviewed.        Assessment:        1. Vertigo    2. Systemic lupus erythematosus with other organ involvement    3. History of stroke            Plan:         Discussed with patient.  Her symptoms that are intermittent and may represent benign postural vertigo with significant improvement in symptoms over the past couple of weeks.  She is advised to continue with her rheumatologist and continue aspirin 81 mg daily.  A copy of this report will be sent to a rheumatologist.  Observation for now and follow-up in one year.

## 2017-06-13 NOTE — PATIENT INSTRUCTIONS
Discussed with patient.  Her symptoms that are intermittent and may represent benign postural vertigo with significant improvement in symptoms over the past couple of weeks.  She is advised to continue with her rheumatologist and continue aspirin 81 mg daily.  A copy of this report will be sent to a rheumatologist.

## 2018-06-23 ENCOUNTER — HOSPITAL ENCOUNTER (EMERGENCY)
Facility: OTHER | Age: 43
Discharge: HOME | End: 2018-06-24
Payer: MEDICARE

## 2018-06-23 DIAGNOSIS — B30.9 VIRAL CONJUNCTIVITIS OF LEFT EYE: Primary | ICD-10-CM

## 2018-06-23 PROCEDURE — 99283 EMERGENCY DEPT VISIT LOW MDM: CPT

## 2018-06-24 VITALS
TEMPERATURE: 98 F | DIASTOLIC BLOOD PRESSURE: 101 MMHG | BODY MASS INDEX: 20.83 KG/M2 | HEART RATE: 84 BPM | WEIGHT: 122 LBS | RESPIRATION RATE: 20 BRPM | SYSTOLIC BLOOD PRESSURE: 177 MMHG | OXYGEN SATURATION: 96 % | HEIGHT: 64 IN

## 2018-06-24 NOTE — ED PROVIDER NOTES
Encounter Date: 6/23/2018       History     Chief Complaint   Patient presents with    Eye Pain     Pt c/o left eye pain x 2 days. pt had redness to eye and went to [facility] to get clear eye and now is unable to open eye due to pain     The patient is a 42-year-old female with a past medical history of hypertension, lupus, and kidney disorder who presents to the ED complaining of left eye pain and redness for the past 2 days.  The patient is afebrile, non-toxic appearing, and hemodynamically stable.  Patient denies trauma to eye.  She does not use contact lenses.  Denies visual change.  Endorses clear drainage and photophobia.  Denies sick contacts.  No treatment attempted prior to arrival.      The history is provided by the patient.     Review of patient's allergies indicates:  No Known Allergies  Past Medical History:   Diagnosis Date    Disorder of kidney and ureter     Hypertension     SLE (systemic lupus erythematosus)      Past Surgical History:   Procedure Laterality Date    RENAL BIOPSY       History reviewed. No pertinent family history.  Social History   Substance Use Topics    Smoking status: Never Smoker    Smokeless tobacco: Not on file    Alcohol use No     Review of Systems   Constitutional: Negative for chills and fever.   HENT: Negative for sore throat.    Eyes: Positive for photophobia, pain, discharge and redness. Negative for itching and visual disturbance.   Respiratory: Negative for shortness of breath and wheezing.    Cardiovascular: Negative for chest pain.   Gastrointestinal: Negative for abdominal pain, nausea and vomiting.   Genitourinary: Negative for dysuria.   Musculoskeletal: Negative for back pain.   Skin: Negative for rash.   Neurological: Negative for weakness.   Hematological: Does not bruise/bleed easily.       Physical Exam     Initial Vitals [06/23/18 2230]   BP Pulse Resp Temp SpO2   (!) 191/114 107 16 98.1 °F (36.7 °C) 97 %      MAP       --         Physical  Exam    Nursing note and vitals reviewed.  Constitutional: She appears well-developed and well-nourished.  Non-toxic appearance.   HENT:   Head: Normocephalic and atraumatic.   Right Ear: Hearing and external ear normal.   Left Ear: Hearing and external ear normal.   Nose: Nose normal.   Eyes: EOM are normal. Pupils are equal, round, and reactive to light. Left eye exhibits discharge (clear). No foreign body present in the left eye. Left conjunctiva is injected. Left conjunctiva has no hemorrhage. No scleral icterus. Left eye exhibits normal extraocular motion and no nystagmus.   Neck: Full passive range of motion without pain. Neck supple.   Cardiovascular: Normal rate and normal pulses.   Pulses:       Radial pulses are 2+ on the right side, and 2+ on the left side.   Pulmonary/Chest: Effort normal. No respiratory distress. She has no decreased breath sounds. She has no wheezes.   Abdominal: Soft. Bowel sounds are normal. There is no tenderness. There is no rigidity, no rebound and no guarding.   Musculoskeletal: Normal range of motion.   Neurological: She is alert and oriented to person, place, and time. She has normal strength. Gait normal. GCS eye subscore is 4. GCS verbal subscore is 5. GCS motor subscore is 6.   Skin: Skin is warm, dry and intact. Capillary refill takes less than 2 seconds. No rash noted.   Psychiatric: She has a normal mood and affect. Her speech is normal and behavior is normal. Judgment and thought content normal. Cognition and memory are normal.         ED Course   Procedures  Labs Reviewed - No data to display       Imaging Results    None          Medical Decision Making:   History:   Old Medical Records: I decided to obtain old medical records.  Differential Diagnosis:   Conjunctivitis  Uveitis  Glaucoma  Corneal Abrasion  Corneal Ulcer  Open globe  ED Management:  Visual acuity demonstrates 20/20 vision in both eyes.  Patient reports last eye exam was 2 months ago with no  abnormalities noted.  Onset of pain is gradual with no complaints of headache, nausea, vomiting, or abdominal pain.  No history of trauma and patient does not wear contact lenses.  Unilateral redness with clear drainage noted on exam.  Clinical presentation suggests viral conjunctivitis.  Patient discharged with instructions to follow up with Ophthalmology in 2 days.  Strict return precautions given.  Case discussed with supervising physician who agrees with plan.      Pt was hypertensive here, however denied any chest pain, headache or dyspnea. No evidence of hypertensive emergency. Risk of emergent blood pressure correction exceeds benefit. Will continue management as an outpt.                      Clinical Impression:   The encounter diagnosis was Viral conjunctivitis of left eye.                             Tessie Craft, AIDEE  06/24/18 7243

## 2018-06-24 NOTE — ED TRIAGE NOTES
"Pt presents to the ED with left eye pain x 2 days. Pt states that her eye is aching, draining water and red. Pt states that she use "clear eyes drops" today with no relief. Pt states her pain is a 9/10. Pt states that she has photophobia. Pt denies trauma to left eye.    "

## 2021-04-01 ENCOUNTER — HOSPITAL ENCOUNTER (INPATIENT)
Facility: OTHER | Age: 46
LOS: 1 days | Discharge: HOME | DRG: 065 | End: 2021-04-03
Attending: EMERGENCY MEDICINE | Admitting: INTERNAL MEDICINE
Payer: MEDICARE

## 2021-04-01 DIAGNOSIS — R51.9 HEADACHE: ICD-10-CM

## 2021-04-01 DIAGNOSIS — R63.4 UNINTENDED WEIGHT LOSS: ICD-10-CM

## 2021-04-01 DIAGNOSIS — I66.22 OCCLUSION OF LEFT POSTERIOR CEREBRAL ARTERY: ICD-10-CM

## 2021-04-01 DIAGNOSIS — I63.9: Primary | ICD-10-CM

## 2021-04-01 DIAGNOSIS — E87.5 HYPERKALEMIA: ICD-10-CM

## 2021-04-01 DIAGNOSIS — I63.9 CVA (CEREBROVASCULAR ACCIDENT): ICD-10-CM

## 2021-04-01 DIAGNOSIS — N17.9 AKI (ACUTE KIDNEY INJURY): ICD-10-CM

## 2021-04-01 DIAGNOSIS — E87.1 HYPONATREMIA: ICD-10-CM

## 2021-04-01 LAB
ALBUMIN SERPL BCP-MCNC: 4 G/DL (ref 3.5–5.2)
ALP SERPL-CCNC: 62 U/L (ref 55–135)
ALT SERPL W/O P-5'-P-CCNC: 14 U/L (ref 10–44)
ANION GAP SERPL CALC-SCNC: 10 MMOL/L (ref 8–16)
ANION GAP SERPL CALC-SCNC: 10 MMOL/L (ref 8–16)
AST SERPL-CCNC: 24 U/L (ref 10–40)
BACTERIA #/AREA URNS HPF: ABNORMAL /HPF
BASOPHILS # BLD AUTO: 0.02 K/UL (ref 0–0.2)
BASOPHILS NFR BLD: 0.3 % (ref 0–1.9)
BILIRUB SERPL-MCNC: 0.3 MG/DL (ref 0.1–1)
BILIRUB UR QL STRIP: NEGATIVE
BUN SERPL-MCNC: 34 MG/DL (ref 6–20)
BUN SERPL-MCNC: 35 MG/DL (ref 6–20)
CALCIUM SERPL-MCNC: 8.9 MG/DL (ref 8.7–10.5)
CALCIUM SERPL-MCNC: 9.3 MG/DL (ref 8.7–10.5)
CHLORIDE SERPL-SCNC: 101 MMOL/L (ref 95–110)
CHLORIDE SERPL-SCNC: 102 MMOL/L (ref 95–110)
CLARITY UR: CLEAR
CO2 SERPL-SCNC: 20 MMOL/L (ref 23–29)
CO2 SERPL-SCNC: 21 MMOL/L (ref 23–29)
COLOR UR: YELLOW
CREAT SERPL-MCNC: 2.2 MG/DL (ref 0.5–1.4)
CREAT SERPL-MCNC: 2.3 MG/DL (ref 0.5–1.4)
CREAT SERPL-MCNC: 2.3 MG/DL (ref 0.5–1.4)
CRP SERPL-MCNC: 1.8 MG/L (ref 0–8.2)
CTP QC/QA: YES
DIFFERENTIAL METHOD: ABNORMAL
EOSINOPHIL # BLD AUTO: 0 K/UL (ref 0–0.5)
EOSINOPHIL NFR BLD: 0 % (ref 0–8)
ERYTHROCYTE [DISTWIDTH] IN BLOOD BY AUTOMATED COUNT: 14.6 % (ref 11.5–14.5)
ERYTHROCYTE [SEDIMENTATION RATE] IN BLOOD: 102 MM/HR (ref 0–20)
EST. GFR  (AFRICAN AMERICAN): 29 ML/MIN/1.73 M^2
EST. GFR  (AFRICAN AMERICAN): 31 ML/MIN/1.73 M^2
EST. GFR  (NON AFRICAN AMERICAN): 25 ML/MIN/1.73 M^2
EST. GFR  (NON AFRICAN AMERICAN): 27 ML/MIN/1.73 M^2
GLUCOSE SERPL-MCNC: 105 MG/DL (ref 70–110)
GLUCOSE SERPL-MCNC: 92 MG/DL (ref 70–110)
GLUCOSE UR QL STRIP: NEGATIVE
HCT VFR BLD AUTO: 35.1 % (ref 37–48.5)
HGB BLD-MCNC: 11.1 G/DL (ref 12–16)
HGB UR QL STRIP: ABNORMAL
HYALINE CASTS #/AREA URNS LPF: 20 /LPF
IMM GRANULOCYTES # BLD AUTO: 0.01 K/UL (ref 0–0.04)
IMM GRANULOCYTES NFR BLD AUTO: 0.2 % (ref 0–0.5)
KETONES UR QL STRIP: NEGATIVE
LEUKOCYTE ESTERASE UR QL STRIP: ABNORMAL
LYMPHOCYTES # BLD AUTO: 0.8 K/UL (ref 1–4.8)
LYMPHOCYTES NFR BLD: 12.2 % (ref 18–48)
MCH RBC QN AUTO: 25.6 PG (ref 27–31)
MCHC RBC AUTO-ENTMCNC: 31.6 G/DL (ref 32–36)
MCV RBC AUTO: 81 FL (ref 82–98)
MICROSCOPIC COMMENT: ABNORMAL
MONOCYTES # BLD AUTO: 0.6 K/UL (ref 0.3–1)
MONOCYTES NFR BLD: 8.9 % (ref 4–15)
NEUTROPHILS # BLD AUTO: 5 K/UL (ref 1.8–7.7)
NEUTROPHILS NFR BLD: 78.4 % (ref 38–73)
NITRITE UR QL STRIP: NEGATIVE
NRBC BLD-RTO: 0 /100 WBC
PH UR STRIP: 6 [PH] (ref 5–8)
PLATELET # BLD AUTO: 192 K/UL (ref 150–450)
PMV BLD AUTO: 11.2 FL (ref 9.2–12.9)
POTASSIUM SERPL-SCNC: 5.9 MMOL/L (ref 3.5–5.1)
POTASSIUM SERPL-SCNC: 6 MMOL/L (ref 3.5–5.1)
PROT SERPL-MCNC: 10.2 G/DL (ref 6–8.4)
PROT UR QL STRIP: ABNORMAL
RBC # BLD AUTO: 4.34 M/UL (ref 4–5.4)
RBC #/AREA URNS HPF: 1 /HPF (ref 0–4)
SAMPLE: ABNORMAL
SARS-COV-2 RDRP RESP QL NAA+PROBE: NEGATIVE
SODIUM SERPL-SCNC: 132 MMOL/L (ref 136–145)
SODIUM SERPL-SCNC: 132 MMOL/L (ref 136–145)
SP GR UR STRIP: >=1.03 (ref 1–1.03)
SQUAMOUS #/AREA URNS HPF: 9 /HPF
URN SPEC COLLECT METH UR: ABNORMAL
UROBILINOGEN UR STRIP-ACNC: NEGATIVE EU/DL
WBC # BLD AUTO: 6.32 K/UL (ref 3.9–12.7)
WBC #/AREA URNS HPF: 24 /HPF (ref 0–5)

## 2021-04-01 PROCEDURE — 96374 THER/PROPH/DIAG INJ IV PUSH: CPT

## 2021-04-01 PROCEDURE — G0378 HOSPITAL OBSERVATION PER HR: HCPCS

## 2021-04-01 PROCEDURE — 80053 COMPREHEN METABOLIC PANEL: CPT | Performed by: PHYSICIAN ASSISTANT

## 2021-04-01 PROCEDURE — 63600175 PHARM REV CODE 636 W HCPCS: Performed by: PHYSICIAN ASSISTANT

## 2021-04-01 PROCEDURE — 99900035 HC TECH TIME PER 15 MIN (STAT)

## 2021-04-01 PROCEDURE — 85651 RBC SED RATE NONAUTOMATED: CPT | Performed by: PHYSICIAN ASSISTANT

## 2021-04-01 PROCEDURE — 99285 EMERGENCY DEPT VISIT HI MDM: CPT | Mod: 25

## 2021-04-01 PROCEDURE — 25000003 PHARM REV CODE 250: Performed by: PHYSICIAN ASSISTANT

## 2021-04-01 PROCEDURE — 86140 C-REACTIVE PROTEIN: CPT | Performed by: PHYSICIAN ASSISTANT

## 2021-04-01 PROCEDURE — U0002 COVID-19 LAB TEST NON-CDC: HCPCS | Performed by: PHYSICIAN ASSISTANT

## 2021-04-01 PROCEDURE — 96375 TX/PRO/DX INJ NEW DRUG ADDON: CPT

## 2021-04-01 PROCEDURE — 87086 URINE CULTURE/COLONY COUNT: CPT | Performed by: PHYSICIAN ASSISTANT

## 2021-04-01 PROCEDURE — 80048 BASIC METABOLIC PNL TOTAL CA: CPT | Performed by: PHYSICIAN ASSISTANT

## 2021-04-01 PROCEDURE — 93005 ELECTROCARDIOGRAM TRACING: CPT

## 2021-04-01 PROCEDURE — 81000 URINALYSIS NONAUTO W/SCOPE: CPT | Performed by: PHYSICIAN ASSISTANT

## 2021-04-01 PROCEDURE — 93010 EKG 12-LEAD: ICD-10-PCS | Mod: ,,, | Performed by: INTERNAL MEDICINE

## 2021-04-01 PROCEDURE — 93010 ELECTROCARDIOGRAM REPORT: CPT | Mod: ,,, | Performed by: INTERNAL MEDICINE

## 2021-04-01 PROCEDURE — 82803 BLOOD GASES ANY COMBINATION: CPT

## 2021-04-01 PROCEDURE — 82565 ASSAY OF CREATININE: CPT

## 2021-04-01 PROCEDURE — 85025 COMPLETE CBC W/AUTO DIFF WBC: CPT | Performed by: PHYSICIAN ASSISTANT

## 2021-04-01 RX ORDER — LABETALOL HYDROCHLORIDE 5 MG/ML
10 INJECTION, SOLUTION INTRAVENOUS
Status: ACTIVE | OUTPATIENT
Start: 2021-04-02 | End: 2021-04-03

## 2021-04-01 RX ORDER — ASPIRIN 81 MG/1
81 TABLET ORAL DAILY
Status: DISCONTINUED | OUTPATIENT
Start: 2021-04-02 | End: 2021-04-03 | Stop reason: HOSPADM

## 2021-04-01 RX ORDER — METOCLOPRAMIDE HYDROCHLORIDE 5 MG/ML
10 INJECTION INTRAMUSCULAR; INTRAVENOUS
Status: COMPLETED | OUTPATIENT
Start: 2021-04-01 | End: 2021-04-01

## 2021-04-01 RX ORDER — ATORVASTATIN CALCIUM 20 MG/1
40 TABLET, FILM COATED ORAL DAILY
Status: DISCONTINUED | OUTPATIENT
Start: 2021-04-02 | End: 2021-04-03 | Stop reason: HOSPADM

## 2021-04-01 RX ORDER — KETOROLAC TROMETHAMINE 30 MG/ML
10 INJECTION, SOLUTION INTRAMUSCULAR; INTRAVENOUS
Status: COMPLETED | OUTPATIENT
Start: 2021-04-01 | End: 2021-04-01

## 2021-04-01 RX ORDER — DIPHENHYDRAMINE HYDROCHLORIDE 50 MG/ML
12.5 INJECTION INTRAMUSCULAR; INTRAVENOUS
Status: COMPLETED | OUTPATIENT
Start: 2021-04-01 | End: 2021-04-01

## 2021-04-01 RX ORDER — SODIUM CHLORIDE 0.9 % (FLUSH) 0.9 %
10 SYRINGE (ML) INJECTION
Status: DISCONTINUED | OUTPATIENT
Start: 2021-04-02 | End: 2021-04-03 | Stop reason: HOSPADM

## 2021-04-01 RX ADMIN — DIPHENHYDRAMINE HYDROCHLORIDE 12.5 MG: 50 INJECTION INTRAMUSCULAR; INTRAVENOUS at 07:04

## 2021-04-01 RX ADMIN — KETOROLAC TROMETHAMINE 10 MG: 30 INJECTION, SOLUTION INTRAMUSCULAR at 07:04

## 2021-04-01 RX ADMIN — METOCLOPRAMIDE 10 MG: 5 INJECTION, SOLUTION INTRAMUSCULAR; INTRAVENOUS at 07:04

## 2021-04-01 RX ADMIN — SODIUM CHLORIDE 1000 ML: 0.9 INJECTION, SOLUTION INTRAVENOUS at 08:04

## 2021-04-02 PROBLEM — N17.9 AKI (ACUTE KIDNEY INJURY): Status: ACTIVE | Noted: 2021-04-02

## 2021-04-02 PROBLEM — N39.0 UTI (URINARY TRACT INFECTION): Status: ACTIVE | Noted: 2021-04-02

## 2021-04-02 LAB
ALBUMIN SERPL BCP-MCNC: 3.1 G/DL (ref 3.5–5.2)
ALP SERPL-CCNC: 52 U/L (ref 55–135)
ALT SERPL W/O P-5'-P-CCNC: 10 U/L (ref 10–44)
ANION GAP SERPL CALC-SCNC: 10 MMOL/L (ref 8–16)
APTT BLDCRRT: 25.2 SEC (ref 21–32)
AST SERPL-CCNC: 15 U/L (ref 10–40)
BASOPHILS # BLD AUTO: 0.02 K/UL (ref 0–0.2)
BASOPHILS NFR BLD: 0.5 % (ref 0–1.9)
BILIRUB SERPL-MCNC: 0.3 MG/DL (ref 0.1–1)
BUN SERPL-MCNC: 39 MG/DL (ref 6–20)
C3 SERPL-MCNC: 55 MG/DL (ref 50–180)
C4 SERPL-MCNC: 14 MG/DL (ref 11–44)
CALCIUM SERPL-MCNC: 8.1 MG/DL (ref 8.7–10.5)
CHLORIDE SERPL-SCNC: 106 MMOL/L (ref 95–110)
CHOLEST SERPL-MCNC: 175 MG/DL (ref 120–199)
CHOLEST/HDLC SERPL: 4.9 {RATIO} (ref 2–5)
CK MB SERPL-MCNC: 1.1 NG/ML (ref 0.1–6.5)
CK MB SERPL-RTO: 2.1 % (ref 0–5)
CK SERPL-CCNC: 53 U/L (ref 20–180)
CO2 SERPL-SCNC: 20 MMOL/L (ref 23–29)
CREAT SERPL-MCNC: 2.7 MG/DL (ref 0.5–1.4)
CREAT UR-MCNC: 202.1 MG/DL (ref 15–325)
DIFFERENTIAL METHOD: ABNORMAL
EOSINOPHIL # BLD AUTO: 0 K/UL (ref 0–0.5)
EOSINOPHIL NFR BLD: 0 % (ref 0–8)
ERYTHROCYTE [DISTWIDTH] IN BLOOD BY AUTOMATED COUNT: 14.6 % (ref 11.5–14.5)
EST. GFR  (AFRICAN AMERICAN): 24 ML/MIN/1.73 M^2
EST. GFR  (NON AFRICAN AMERICAN): 21 ML/MIN/1.73 M^2
ESTIMATED AVG GLUCOSE: 111 MG/DL (ref 68–131)
GLUCOSE SERPL-MCNC: 76 MG/DL (ref 70–110)
HBA1C MFR BLD: 5.5 % (ref 4–5.6)
HCT VFR BLD AUTO: 30.4 % (ref 37–48.5)
HDLC SERPL-MCNC: 36 MG/DL (ref 40–75)
HDLC SERPL: 20.6 % (ref 20–50)
HGB BLD-MCNC: 9.4 G/DL (ref 12–16)
IMM GRANULOCYTES # BLD AUTO: 0 K/UL (ref 0–0.04)
IMM GRANULOCYTES NFR BLD AUTO: 0 % (ref 0–0.5)
INR PPP: 1 (ref 0.8–1.2)
LDLC SERPL CALC-MCNC: 126.8 MG/DL (ref 63–159)
LYMPHOCYTES # BLD AUTO: 1.3 K/UL (ref 1–4.8)
LYMPHOCYTES NFR BLD: 32.4 % (ref 18–48)
MAGNESIUM SERPL-MCNC: 2 MG/DL (ref 1.6–2.6)
MCH RBC QN AUTO: 24.7 PG (ref 27–31)
MCHC RBC AUTO-ENTMCNC: 30.9 G/DL (ref 32–36)
MCV RBC AUTO: 80 FL (ref 82–98)
MONOCYTES # BLD AUTO: 0.6 K/UL (ref 0.3–1)
MONOCYTES NFR BLD: 15.1 % (ref 4–15)
NEUTROPHILS # BLD AUTO: 2 K/UL (ref 1.8–7.7)
NEUTROPHILS NFR BLD: 52 % (ref 38–73)
NONHDLC SERPL-MCNC: 139 MG/DL
NRBC BLD-RTO: 0 /100 WBC
OSMOLALITY UR: 369 MOSM/KG (ref 50–1200)
PHOSPHATE SERPL-MCNC: 5.2 MG/DL (ref 2.7–4.5)
PLATELET # BLD AUTO: 156 K/UL (ref 150–450)
PMV BLD AUTO: 10.8 FL (ref 9.2–12.9)
POTASSIUM SERPL-SCNC: 5.2 MMOL/L (ref 3.5–5.1)
PROT SERPL-MCNC: 7.8 G/DL (ref 6–8.4)
PROT UR-MCNC: 24 MG/DL (ref 0–15)
PROT/CREAT UR: 0.12 MG/G{CREAT} (ref 0–0.2)
PROTHROMBIN TIME: 10.8 SEC (ref 9–12.5)
RBC # BLD AUTO: 3.8 M/UL (ref 4–5.4)
SODIUM SERPL-SCNC: 136 MMOL/L (ref 136–145)
SODIUM UR-SCNC: 41 MMOL/L (ref 20–250)
TRIGL SERPL-MCNC: 61 MG/DL (ref 30–150)
TROPONIN I SERPL DL<=0.01 NG/ML-MCNC: 0.01 NG/ML (ref 0–0.03)
TSH SERPL DL<=0.005 MIU/L-ACNC: 3.8 UIU/ML (ref 0.4–4)
WBC # BLD AUTO: 3.92 K/UL (ref 3.9–12.7)

## 2021-04-02 PROCEDURE — 83036 HEMOGLOBIN GLYCOSYLATED A1C: CPT | Performed by: PHYSICIAN ASSISTANT

## 2021-04-02 PROCEDURE — 21400001 HC TELEMETRY ROOM

## 2021-04-02 PROCEDURE — 96375 TX/PRO/DX INJ NEW DRUG ADDON: CPT

## 2021-04-02 PROCEDURE — 94761 N-INVAS EAR/PLS OXIMETRY MLT: CPT

## 2021-04-02 PROCEDURE — 82550 ASSAY OF CK (CPK): CPT | Performed by: PHYSICIAN ASSISTANT

## 2021-04-02 PROCEDURE — 36415 COLL VENOUS BLD VENIPUNCTURE: CPT | Performed by: INTERNAL MEDICINE

## 2021-04-02 PROCEDURE — 25000003 PHARM REV CODE 250: Performed by: PHYSICIAN ASSISTANT

## 2021-04-02 PROCEDURE — 36415 COLL VENOUS BLD VENIPUNCTURE: CPT | Performed by: PHYSICIAN ASSISTANT

## 2021-04-02 PROCEDURE — 86160 COMPLEMENT ANTIGEN: CPT | Performed by: INTERNAL MEDICINE

## 2021-04-02 PROCEDURE — 96361 HYDRATE IV INFUSION ADD-ON: CPT

## 2021-04-02 PROCEDURE — 85025 COMPLETE CBC W/AUTO DIFF WBC: CPT | Performed by: PHYSICIAN ASSISTANT

## 2021-04-02 PROCEDURE — 86160 COMPLEMENT ANTIGEN: CPT | Mod: 59 | Performed by: INTERNAL MEDICINE

## 2021-04-02 PROCEDURE — 83735 ASSAY OF MAGNESIUM: CPT | Performed by: PHYSICIAN ASSISTANT

## 2021-04-02 PROCEDURE — 83935 ASSAY OF URINE OSMOLALITY: CPT | Performed by: PHYSICIAN ASSISTANT

## 2021-04-02 PROCEDURE — 85610 PROTHROMBIN TIME: CPT | Performed by: PHYSICIAN ASSISTANT

## 2021-04-02 PROCEDURE — 99223 1ST HOSP IP/OBS HIGH 75: CPT | Mod: ,,, | Performed by: STUDENT IN AN ORGANIZED HEALTH CARE EDUCATION/TRAINING PROGRAM

## 2021-04-02 PROCEDURE — 80061 LIPID PANEL: CPT | Performed by: PHYSICIAN ASSISTANT

## 2021-04-02 PROCEDURE — 82553 CREATINE MB FRACTION: CPT | Performed by: PHYSICIAN ASSISTANT

## 2021-04-02 PROCEDURE — 92610 EVALUATE SWALLOWING FUNCTION: CPT

## 2021-04-02 PROCEDURE — 63600175 PHARM REV CODE 636 W HCPCS: Performed by: PHYSICIAN ASSISTANT

## 2021-04-02 PROCEDURE — 97165 OT EVAL LOW COMPLEX 30 MIN: CPT

## 2021-04-02 PROCEDURE — 84484 ASSAY OF TROPONIN QUANT: CPT | Performed by: PHYSICIAN ASSISTANT

## 2021-04-02 PROCEDURE — 84443 ASSAY THYROID STIM HORMONE: CPT | Performed by: PHYSICIAN ASSISTANT

## 2021-04-02 PROCEDURE — 99223 PR INITIAL HOSPITAL CARE,LEVL III: ICD-10-PCS | Mod: ,,, | Performed by: STUDENT IN AN ORGANIZED HEALTH CARE EDUCATION/TRAINING PROGRAM

## 2021-04-02 PROCEDURE — 84156 ASSAY OF PROTEIN URINE: CPT | Performed by: PHYSICIAN ASSISTANT

## 2021-04-02 PROCEDURE — 84100 ASSAY OF PHOSPHORUS: CPT | Performed by: PHYSICIAN ASSISTANT

## 2021-04-02 PROCEDURE — 80053 COMPREHEN METABOLIC PANEL: CPT | Performed by: PHYSICIAN ASSISTANT

## 2021-04-02 PROCEDURE — 99220 PR INITIAL OBSERVATION CARE,LEVL III: CPT | Mod: ,,, | Performed by: PHYSICIAN ASSISTANT

## 2021-04-02 PROCEDURE — 25000003 PHARM REV CODE 250: Performed by: NURSE PRACTITIONER

## 2021-04-02 PROCEDURE — 99220 PR INITIAL OBSERVATION CARE,LEVL III: ICD-10-PCS | Mod: ,,, | Performed by: PHYSICIAN ASSISTANT

## 2021-04-02 PROCEDURE — 84300 ASSAY OF URINE SODIUM: CPT | Performed by: PHYSICIAN ASSISTANT

## 2021-04-02 PROCEDURE — 85730 THROMBOPLASTIN TIME PARTIAL: CPT | Performed by: PHYSICIAN ASSISTANT

## 2021-04-02 PROCEDURE — 97161 PT EVAL LOW COMPLEX 20 MIN: CPT

## 2021-04-02 RX ORDER — CLOPIDOGREL BISULFATE 75 MG/1
300 TABLET ORAL ONCE
Status: COMPLETED | OUTPATIENT
Start: 2021-04-02 | End: 2021-04-02

## 2021-04-02 RX ORDER — PREDNISONE 5 MG/1
5 TABLET ORAL DAILY
Status: DISCONTINUED | OUTPATIENT
Start: 2021-04-02 | End: 2021-04-03 | Stop reason: HOSPADM

## 2021-04-02 RX ORDER — HYDROXYZINE HYDROCHLORIDE 25 MG/1
25 TABLET, FILM COATED ORAL 4 TIMES DAILY PRN
Status: DISCONTINUED | OUTPATIENT
Start: 2021-04-02 | End: 2021-04-03 | Stop reason: HOSPADM

## 2021-04-02 RX ORDER — SODIUM CHLORIDE 0.9 % (FLUSH) 0.9 %
10 SYRINGE (ML) INJECTION
Status: DISCONTINUED | OUTPATIENT
Start: 2021-04-02 | End: 2021-04-03 | Stop reason: HOSPADM

## 2021-04-02 RX ORDER — SODIUM CHLORIDE 9 MG/ML
INJECTION, SOLUTION INTRAVENOUS CONTINUOUS
Status: DISCONTINUED | OUTPATIENT
Start: 2021-04-02 | End: 2021-04-03

## 2021-04-02 RX ORDER — SODIUM BICARBONATE 650 MG/1
1300 TABLET ORAL ONCE
Status: COMPLETED | OUTPATIENT
Start: 2021-04-02 | End: 2021-04-02

## 2021-04-02 RX ORDER — MYCOPHENOLATE MOFETIL 250 MG/1
500 CAPSULE ORAL 2 TIMES DAILY
Status: DISCONTINUED | OUTPATIENT
Start: 2021-04-02 | End: 2021-04-03 | Stop reason: HOSPADM

## 2021-04-02 RX ORDER — HYDROXYCHLOROQUINE SULFATE 200 MG/1
200 TABLET, FILM COATED ORAL DAILY
Status: DISCONTINUED | OUTPATIENT
Start: 2021-04-02 | End: 2021-04-03 | Stop reason: HOSPADM

## 2021-04-02 RX ORDER — CLOPIDOGREL BISULFATE 75 MG/1
75 TABLET ORAL DAILY
Status: DISCONTINUED | OUTPATIENT
Start: 2021-04-03 | End: 2021-04-03 | Stop reason: HOSPADM

## 2021-04-02 RX ORDER — TALC
6 POWDER (GRAM) TOPICAL NIGHTLY PRN
Status: DISCONTINUED | OUTPATIENT
Start: 2021-04-02 | End: 2021-04-03 | Stop reason: HOSPADM

## 2021-04-02 RX ADMIN — CLOPIDOGREL 300 MG: 75 TABLET, FILM COATED ORAL at 07:04

## 2021-04-02 RX ADMIN — SODIUM CHLORIDE: 0.9 INJECTION, SOLUTION INTRAVENOUS at 11:04

## 2021-04-02 RX ADMIN — CEFTRIAXONE 1 G: 1 INJECTION, SOLUTION INTRAVENOUS at 01:04

## 2021-04-02 RX ADMIN — SODIUM ZIRCONIUM CYCLOSILICATE 10 G: 10 POWDER, FOR SUSPENSION ORAL at 01:04

## 2021-04-02 RX ADMIN — HYDROXYCHLOROQUINE SULFATE 200 MG: 200 TABLET, FILM COATED ORAL at 10:04

## 2021-04-02 RX ADMIN — SODIUM CHLORIDE: 0.9 INJECTION, SOLUTION INTRAVENOUS at 07:04

## 2021-04-02 RX ADMIN — ATORVASTATIN CALCIUM 40 MG: 20 TABLET, FILM COATED ORAL at 10:04

## 2021-04-02 RX ADMIN — SODIUM BICARBONATE 650 MG TABLET 1300 MG: at 01:04

## 2021-04-02 RX ADMIN — MYCOPHENOLATE MOFETIL 500 MG: 250 CAPSULE ORAL at 09:04

## 2021-04-02 RX ADMIN — ASPIRIN 81 MG: 81 TABLET, FILM COATED ORAL at 10:04

## 2021-04-02 RX ADMIN — PREDNISONE 5 MG: 5 TABLET ORAL at 10:04

## 2021-04-02 RX ADMIN — MYCOPHENOLATE MOFETIL 500 MG: 250 CAPSULE ORAL at 10:04

## 2021-04-02 RX ADMIN — SODIUM CHLORIDE: 0.9 INJECTION, SOLUTION INTRAVENOUS at 01:04

## 2021-04-03 VITALS
BODY MASS INDEX: 22.86 KG/M2 | HEIGHT: 63 IN | HEART RATE: 71 BPM | DIASTOLIC BLOOD PRESSURE: 81 MMHG | SYSTOLIC BLOOD PRESSURE: 157 MMHG | OXYGEN SATURATION: 97 % | WEIGHT: 129 LBS | RESPIRATION RATE: 16 BRPM | TEMPERATURE: 98 F

## 2021-04-03 LAB
ALBUMIN SERPL BCP-MCNC: 2.9 G/DL (ref 3.5–5.2)
ALP SERPL-CCNC: 45 U/L (ref 55–135)
ALT SERPL W/O P-5'-P-CCNC: 9 U/L (ref 10–44)
ANION GAP SERPL CALC-SCNC: 6 MMOL/L (ref 8–16)
AST SERPL-CCNC: 17 U/L (ref 10–40)
AV INDEX (PROSTH): 0.66
AV MEAN GRADIENT: 6 MMHG
AV PEAK GRADIENT: 10 MMHG
AV VALVE AREA: 2.08 CM2
AV VELOCITY RATIO: 0.75
BACTERIA UR CULT: NORMAL
BACTERIA UR CULT: NORMAL
BASOPHILS # BLD AUTO: 0.01 K/UL (ref 0–0.2)
BASOPHILS NFR BLD: 0.3 % (ref 0–1.9)
BILIRUB SERPL-MCNC: 0.2 MG/DL (ref 0.1–1)
BSA FOR ECHO PROCEDURE: 1.61 M2
BUN SERPL-MCNC: 36 MG/DL (ref 6–20)
CALCIUM SERPL-MCNC: 7.7 MG/DL (ref 8.7–10.5)
CHLORIDE SERPL-SCNC: 111 MMOL/L (ref 95–110)
CO2 SERPL-SCNC: 19 MMOL/L (ref 23–29)
CREAT SERPL-MCNC: 1.2 MG/DL (ref 0.5–1.4)
CV ECHO LV RWT: 0.38 CM
DIFFERENTIAL METHOD: ABNORMAL
DOP CALC AO PEAK VEL: 1.61 M/S
DOP CALC AO VTI: 36.87 CM
DOP CALC LVOT AREA: 3.2 CM2
DOP CALC LVOT DIAMETER: 2.01 CM
DOP CALC LVOT PEAK VEL: 1.2 M/S
DOP CALC LVOT STROKE VOLUME: 76.65 CM3
DOP CALCLVOT PEAK VEL VTI: 24.17 CM
E WAVE DECELERATION TIME: 167.18 MSEC
E/A RATIO: 1.46
E/E' RATIO: 8.09 M/S
ECHO LV POSTERIOR WALL: 0.78 CM (ref 0.6–1.1)
EJECTION FRACTION: 70 %
EOSINOPHIL # BLD AUTO: 0 K/UL (ref 0–0.5)
EOSINOPHIL NFR BLD: 0 % (ref 0–8)
ERYTHROCYTE [DISTWIDTH] IN BLOOD BY AUTOMATED COUNT: 14.6 % (ref 11.5–14.5)
EST. GFR  (AFRICAN AMERICAN): >60 ML/MIN/1.73 M^2
EST. GFR  (NON AFRICAN AMERICAN): 55 ML/MIN/1.73 M^2
FRACTIONAL SHORTENING: 45 % (ref 28–44)
GLUCOSE SERPL-MCNC: 82 MG/DL (ref 70–110)
HCT VFR BLD AUTO: 26.8 % (ref 37–48.5)
HGB BLD-MCNC: 8.3 G/DL (ref 12–16)
IMM GRANULOCYTES # BLD AUTO: 0.01 K/UL (ref 0–0.04)
IMM GRANULOCYTES NFR BLD AUTO: 0.3 % (ref 0–0.5)
INTERVENTRICULAR SEPTUM: 0.82 CM (ref 0.6–1.1)
IVRT: 64.7 MSEC
LA MAJOR: 4.21 CM
LA MINOR: 4.88 CM
LA WIDTH: 4.25 CM
LEFT ATRIUM SIZE: 3.21 CM
LEFT ATRIUM VOLUME INDEX MOD: 29.4 ML/M2
LEFT ATRIUM VOLUME INDEX: 32.8 ML/M2
LEFT ATRIUM VOLUME MOD: 47 CM3
LEFT ATRIUM VOLUME: 52.42 CM3
LEFT INTERNAL DIMENSION IN SYSTOLE: 2.26 CM (ref 2.1–4)
LEFT VENTRICLE DIASTOLIC VOLUME INDEX: 47.21 ML/M2
LEFT VENTRICLE DIASTOLIC VOLUME: 75.53 ML
LEFT VENTRICLE MASS INDEX: 62 G/M2
LEFT VENTRICLE SYSTOLIC VOLUME INDEX: 10.9 ML/M2
LEFT VENTRICLE SYSTOLIC VOLUME: 17.37 ML
LEFT VENTRICULAR INTERNAL DIMENSION IN DIASTOLE: 4.13 CM (ref 3.5–6)
LEFT VENTRICULAR MASS: 98.52 G
LV LATERAL E/E' RATIO: 6.85 M/S
LV SEPTAL E/E' RATIO: 9.89 M/S
LYMPHOCYTES # BLD AUTO: 1.5 K/UL (ref 1–4.8)
LYMPHOCYTES NFR BLD: 47 % (ref 18–48)
MCH RBC QN AUTO: 25.1 PG (ref 27–31)
MCHC RBC AUTO-ENTMCNC: 31 G/DL (ref 32–36)
MCV RBC AUTO: 81 FL (ref 82–98)
MONOCYTES # BLD AUTO: 0.4 K/UL (ref 0.3–1)
MONOCYTES NFR BLD: 13.4 % (ref 4–15)
MV PEAK A VEL: 0.61 M/S
MV PEAK E VEL: 0.89 M/S
MV STENOSIS PRESSURE HALF TIME: 48.48 MS
MV VALVE AREA P 1/2 METHOD: 4.54 CM2
NEUTROPHILS # BLD AUTO: 1.3 K/UL (ref 1.8–7.7)
NEUTROPHILS NFR BLD: 39 % (ref 38–73)
NRBC BLD-RTO: 0 /100 WBC
PISA MRMAX VEL: 0.03 M/S
PISA TR MAX VEL: 2.39 M/S
PLATELET # BLD AUTO: 139 K/UL (ref 150–450)
PMV BLD AUTO: 11.5 FL (ref 9.2–12.9)
POTASSIUM SERPL-SCNC: 4.6 MMOL/L (ref 3.5–5.1)
PROT SERPL-MCNC: 7.1 G/DL (ref 6–8.4)
PULM VEIN S/D RATIO: 1.13
PV PEAK D VEL: 0.56 M/S
PV PEAK S VEL: 0.63 M/S
PV PEAK VELOCITY: 0.91 CM/S
RA MAJOR: 4.11 CM
RA PRESSURE: 3 MMHG
RA WIDTH: 3.36 CM
RBC # BLD AUTO: 3.31 M/UL (ref 4–5.4)
SINUS: 2.58 CM
SODIUM SERPL-SCNC: 136 MMOL/L (ref 136–145)
STJ: 2.3 CM
TDI LATERAL: 0.13 M/S
TDI SEPTAL: 0.09 M/S
TDI: 0.11 M/S
TR MAX PG: 23 MMHG
TRICUSPID ANNULAR PLANE SYSTOLIC EXCURSION: 2.71 CM
TV REST PULMONARY ARTERY PRESSURE: 26 MMHG
WBC # BLD AUTO: 3.28 K/UL (ref 3.9–12.7)

## 2021-04-03 PROCEDURE — 92523 SPEECH SOUND LANG COMPREHEN: CPT

## 2021-04-03 PROCEDURE — 99233 PR SUBSEQUENT HOSPITAL CARE,LEVL III: ICD-10-PCS | Mod: ,,, | Performed by: PHYSICIAN ASSISTANT

## 2021-04-03 PROCEDURE — 99233 SBSQ HOSP IP/OBS HIGH 50: CPT | Mod: ,,, | Performed by: PHYSICIAN ASSISTANT

## 2021-04-03 PROCEDURE — 25000003 PHARM REV CODE 250: Performed by: PHYSICIAN ASSISTANT

## 2021-04-03 PROCEDURE — 63600175 PHARM REV CODE 636 W HCPCS: Performed by: PHYSICIAN ASSISTANT

## 2021-04-03 PROCEDURE — 85025 COMPLETE CBC W/AUTO DIFF WBC: CPT | Performed by: PHYSICIAN ASSISTANT

## 2021-04-03 PROCEDURE — 36415 COLL VENOUS BLD VENIPUNCTURE: CPT | Performed by: PHYSICIAN ASSISTANT

## 2021-04-03 PROCEDURE — 80053 COMPREHEN METABOLIC PANEL: CPT | Performed by: PHYSICIAN ASSISTANT

## 2021-04-03 RX ORDER — CLOPIDOGREL BISULFATE 75 MG/1
75 TABLET ORAL DAILY
Qty: 20 TABLET | Refills: 0 | Status: SHIPPED | OUTPATIENT
Start: 2021-04-04 | End: 2021-05-07 | Stop reason: ALTCHOICE

## 2021-04-03 RX ORDER — SODIUM BICARBONATE 650 MG/1
650 TABLET ORAL DAILY
Qty: 30 TABLET | Refills: 0 | Status: SHIPPED | OUTPATIENT
Start: 2021-04-03 | End: 2022-02-14 | Stop reason: ALTCHOICE

## 2021-04-03 RX ORDER — ATORVASTATIN CALCIUM 40 MG/1
40 TABLET, FILM COATED ORAL DAILY
Qty: 30 TABLET | Refills: 1 | Status: SHIPPED | OUTPATIENT
Start: 2021-04-04 | End: 2021-08-14 | Stop reason: SDUPTHER

## 2021-04-03 RX ADMIN — HYDROXYCHLOROQUINE SULFATE 200 MG: 200 TABLET, FILM COATED ORAL at 08:04

## 2021-04-03 RX ADMIN — CLOPIDOGREL 75 MG: 75 TABLET, FILM COATED ORAL at 08:04

## 2021-04-03 RX ADMIN — ATORVASTATIN CALCIUM 40 MG: 20 TABLET, FILM COATED ORAL at 08:04

## 2021-04-03 RX ADMIN — CEFTRIAXONE 1 G: 1 INJECTION, SOLUTION INTRAVENOUS at 12:04

## 2021-04-03 RX ADMIN — ASPIRIN 81 MG: 81 TABLET, FILM COATED ORAL at 08:04

## 2021-04-03 RX ADMIN — MYCOPHENOLATE MOFETIL 500 MG: 250 CAPSULE ORAL at 08:04

## 2021-04-03 RX ADMIN — PREDNISONE 5 MG: 5 TABLET ORAL at 08:04

## 2021-04-05 ENCOUNTER — TELEPHONE (OUTPATIENT)
Dept: NEUROLOGY | Facility: CLINIC | Age: 46
End: 2021-04-05

## 2021-04-06 ENCOUNTER — HOSPITAL ENCOUNTER (EMERGENCY)
Facility: OTHER | Age: 46
Discharge: HOME | End: 2021-04-06
Attending: EMERGENCY MEDICINE
Payer: MEDICARE

## 2021-04-06 VITALS
DIASTOLIC BLOOD PRESSURE: 84 MMHG | WEIGHT: 125 LBS | HEART RATE: 87 BPM | RESPIRATION RATE: 18 BRPM | HEIGHT: 63 IN | BODY MASS INDEX: 22.15 KG/M2 | SYSTOLIC BLOOD PRESSURE: 151 MMHG | OXYGEN SATURATION: 99 % | TEMPERATURE: 98 F

## 2021-04-06 DIAGNOSIS — R51.9 ACUTE NONINTRACTABLE HEADACHE, UNSPECIFIED HEADACHE TYPE: Primary | ICD-10-CM

## 2021-04-06 LAB
ALBUMIN SERPL BCP-MCNC: 3.5 G/DL (ref 3.5–5.2)
ALP SERPL-CCNC: 58 U/L (ref 55–135)
ALT SERPL W/O P-5'-P-CCNC: 14 U/L (ref 10–44)
ANION GAP SERPL CALC-SCNC: 11 MMOL/L (ref 8–16)
APTT BLDCRRT: 21.7 SEC (ref 21–32)
AST SERPL-CCNC: 19 U/L (ref 10–40)
B-HCG UR QL: NEGATIVE
BASOPHILS # BLD AUTO: 0.01 K/UL (ref 0–0.2)
BASOPHILS NFR BLD: 0.2 % (ref 0–1.9)
BILIRUB SERPL-MCNC: 0.3 MG/DL (ref 0.1–1)
BUN SERPL-MCNC: 13 MG/DL (ref 6–20)
CALCIUM SERPL-MCNC: 8.9 MG/DL (ref 8.7–10.5)
CHLORIDE SERPL-SCNC: 105 MMOL/L (ref 95–110)
CO2 SERPL-SCNC: 20 MMOL/L (ref 23–29)
CREAT SERPL-MCNC: 0.8 MG/DL (ref 0.5–1.4)
CTP QC/QA: YES
CTP QC/QA: YES
DIFFERENTIAL METHOD: ABNORMAL
EOSINOPHIL # BLD AUTO: 0 K/UL (ref 0–0.5)
EOSINOPHIL NFR BLD: 0.6 % (ref 0–8)
ERYTHROCYTE [DISTWIDTH] IN BLOOD BY AUTOMATED COUNT: 14.6 % (ref 11.5–14.5)
EST. GFR  (AFRICAN AMERICAN): >60 ML/MIN/1.73 M^2
EST. GFR  (NON AFRICAN AMERICAN): >60 ML/MIN/1.73 M^2
GLUCOSE SERPL-MCNC: 98 MG/DL (ref 70–110)
HCT VFR BLD AUTO: 32.9 % (ref 37–48.5)
HCV AB SERPL QL IA: NEGATIVE
HGB BLD-MCNC: 10.5 G/DL (ref 12–16)
HIV 1+2 AB+HIV1 P24 AG SERPL QL IA: NEGATIVE
IMM GRANULOCYTES # BLD AUTO: 0.01 K/UL (ref 0–0.04)
IMM GRANULOCYTES NFR BLD AUTO: 0.2 % (ref 0–0.5)
INR PPP: 1 (ref 0.8–1.2)
LYMPHOCYTES # BLD AUTO: 1 K/UL (ref 1–4.8)
LYMPHOCYTES NFR BLD: 16.1 % (ref 18–48)
MCH RBC QN AUTO: 25.6 PG (ref 27–31)
MCHC RBC AUTO-ENTMCNC: 31.9 G/DL (ref 32–36)
MCV RBC AUTO: 80 FL (ref 82–98)
MONOCYTES # BLD AUTO: 0.7 K/UL (ref 0.3–1)
MONOCYTES NFR BLD: 10.2 % (ref 4–15)
NEUTROPHILS # BLD AUTO: 4.6 K/UL (ref 1.8–7.7)
NEUTROPHILS NFR BLD: 72.7 % (ref 38–73)
NRBC BLD-RTO: 0 /100 WBC
PLATELET # BLD AUTO: 170 K/UL (ref 150–450)
PLATELET BLD QL SMEAR: ABNORMAL
PMV BLD AUTO: 11.2 FL (ref 9.2–12.9)
POTASSIUM SERPL-SCNC: 3.9 MMOL/L (ref 3.5–5.1)
PROT SERPL-MCNC: 8.9 G/DL (ref 6–8.4)
PROTHROMBIN TIME: 11 SEC (ref 9–12.5)
RBC # BLD AUTO: 4.1 M/UL (ref 4–5.4)
SARS-COV-2 RDRP RESP QL NAA+PROBE: NEGATIVE
SODIUM SERPL-SCNC: 136 MMOL/L (ref 136–145)
WBC # BLD AUTO: 6.35 K/UL (ref 3.9–12.7)

## 2021-04-06 PROCEDURE — 85610 PROTHROMBIN TIME: CPT | Performed by: EMERGENCY MEDICINE

## 2021-04-06 PROCEDURE — 63600175 PHARM REV CODE 636 W HCPCS: Performed by: EMERGENCY MEDICINE

## 2021-04-06 PROCEDURE — 80053 COMPREHEN METABOLIC PANEL: CPT | Performed by: EMERGENCY MEDICINE

## 2021-04-06 PROCEDURE — 96375 TX/PRO/DX INJ NEW DRUG ADDON: CPT

## 2021-04-06 PROCEDURE — 96361 HYDRATE IV INFUSION ADD-ON: CPT

## 2021-04-06 PROCEDURE — 85025 COMPLETE CBC W/AUTO DIFF WBC: CPT | Performed by: EMERGENCY MEDICINE

## 2021-04-06 PROCEDURE — 85730 THROMBOPLASTIN TIME PARTIAL: CPT | Performed by: EMERGENCY MEDICINE

## 2021-04-06 PROCEDURE — 86703 HIV-1/HIV-2 1 RESULT ANTBDY: CPT | Performed by: EMERGENCY MEDICINE

## 2021-04-06 PROCEDURE — 99284 EMERGENCY DEPT VISIT MOD MDM: CPT | Mod: 25

## 2021-04-06 PROCEDURE — U0002 COVID-19 LAB TEST NON-CDC: HCPCS | Performed by: EMERGENCY MEDICINE

## 2021-04-06 PROCEDURE — 81025 URINE PREGNANCY TEST: CPT | Performed by: EMERGENCY MEDICINE

## 2021-04-06 PROCEDURE — 96374 THER/PROPH/DIAG INJ IV PUSH: CPT

## 2021-04-06 PROCEDURE — 25000003 PHARM REV CODE 250: Performed by: EMERGENCY MEDICINE

## 2021-04-06 PROCEDURE — 86803 HEPATITIS C AB TEST: CPT | Performed by: EMERGENCY MEDICINE

## 2021-04-06 RX ORDER — DIPHENHYDRAMINE HYDROCHLORIDE 50 MG/ML
25 INJECTION INTRAMUSCULAR; INTRAVENOUS
Status: COMPLETED | OUTPATIENT
Start: 2021-04-06 | End: 2021-04-06

## 2021-04-06 RX ORDER — ONDANSETRON 2 MG/ML
4 INJECTION INTRAMUSCULAR; INTRAVENOUS
Status: COMPLETED | OUTPATIENT
Start: 2021-04-06 | End: 2021-04-06

## 2021-04-06 RX ORDER — METOCLOPRAMIDE HYDROCHLORIDE 5 MG/ML
10 INJECTION INTRAMUSCULAR; INTRAVENOUS
Status: COMPLETED | OUTPATIENT
Start: 2021-04-06 | End: 2021-04-06

## 2021-04-06 RX ADMIN — ONDANSETRON 4 MG: 2 INJECTION INTRAMUSCULAR; INTRAVENOUS at 10:04

## 2021-04-06 RX ADMIN — SODIUM CHLORIDE 1000 ML: 0.9 INJECTION, SOLUTION INTRAVENOUS at 10:04

## 2021-04-06 RX ADMIN — METOCLOPRAMIDE 10 MG: 5 INJECTION, SOLUTION INTRAMUSCULAR; INTRAVENOUS at 10:04

## 2021-04-06 RX ADMIN — DIPHENHYDRAMINE HYDROCHLORIDE 25 MG: 50 INJECTION INTRAMUSCULAR; INTRAVENOUS at 10:04

## 2021-04-08 ENCOUNTER — DOCUMENTATION ONLY (OUTPATIENT)
Dept: REHABILITATION | Facility: HOSPITAL | Age: 46
End: 2021-04-08

## 2021-04-12 ENCOUNTER — OFFICE VISIT (OUTPATIENT)
Dept: CARDIOLOGY | Facility: CLINIC | Age: 46
End: 2021-04-12
Payer: MEDICARE

## 2021-04-12 VITALS
BODY MASS INDEX: 20.39 KG/M2 | DIASTOLIC BLOOD PRESSURE: 82 MMHG | WEIGHT: 115.06 LBS | SYSTOLIC BLOOD PRESSURE: 118 MMHG | HEART RATE: 74 BPM

## 2021-04-12 DIAGNOSIS — M32.9 SYSTEMIC LUPUS ERYTHEMATOSUS, UNSPECIFIED SLE TYPE, UNSPECIFIED ORGAN INVOLVEMENT STATUS: ICD-10-CM

## 2021-04-12 DIAGNOSIS — G45.9 TRANSIENT CEREBRAL ISCHEMIA, UNSPECIFIED TYPE: ICD-10-CM

## 2021-04-12 DIAGNOSIS — I63.9 ISCHEMIC STROKE: Primary | ICD-10-CM

## 2021-04-12 DIAGNOSIS — I10 ESSENTIAL HYPERTENSION: ICD-10-CM

## 2021-04-12 PROCEDURE — 99999 PR PBB SHADOW E&M-EST. PATIENT-LVL III: ICD-10-PCS | Mod: PBBFAC,,, | Performed by: INTERNAL MEDICINE

## 2021-04-12 PROCEDURE — 99203 OFFICE O/P NEW LOW 30 MIN: CPT | Mod: S$PBB,,, | Performed by: INTERNAL MEDICINE

## 2021-04-12 PROCEDURE — 99203 PR OFFICE/OUTPT VISIT, NEW, LEVL III, 30-44 MIN: ICD-10-PCS | Mod: S$PBB,,, | Performed by: INTERNAL MEDICINE

## 2021-04-12 PROCEDURE — 99213 OFFICE O/P EST LOW 20 MIN: CPT | Mod: PBBFAC | Performed by: INTERNAL MEDICINE

## 2021-04-12 PROCEDURE — 99999 PR PBB SHADOW E&M-EST. PATIENT-LVL III: CPT | Mod: PBBFAC,,, | Performed by: INTERNAL MEDICINE

## 2021-04-14 ENCOUNTER — CLINICAL SUPPORT (OUTPATIENT)
Dept: REHABILITATION | Facility: HOSPITAL | Age: 46
End: 2021-04-14
Payer: MEDICARE

## 2021-04-14 DIAGNOSIS — R41.841 COGNITIVE COMMUNICATION DEFICIT: ICD-10-CM

## 2021-04-14 PROCEDURE — 96125 COGNITIVE TEST BY HC PRO: CPT | Mod: PO

## 2021-04-16 ENCOUNTER — CLINICAL SUPPORT (OUTPATIENT)
Dept: CARDIOLOGY | Facility: HOSPITAL | Age: 46
End: 2021-04-16
Attending: INTERNAL MEDICINE
Payer: MEDICARE

## 2021-04-16 DIAGNOSIS — G45.9 TRANSIENT CEREBRAL ISCHEMIA, UNSPECIFIED TYPE: ICD-10-CM

## 2021-04-16 DIAGNOSIS — I63.9 ISCHEMIC STROKE: ICD-10-CM

## 2021-04-16 PROCEDURE — 93271 ECG/MONITORING AND ANALYSIS: CPT

## 2021-04-16 PROCEDURE — 93272 ECG/REVIEW INTERPRET ONLY: CPT | Mod: ,,, | Performed by: STUDENT IN AN ORGANIZED HEALTH CARE EDUCATION/TRAINING PROGRAM

## 2021-04-16 PROCEDURE — 93272 CARDIAC EVENT MONITOR (CUPID ONLY): ICD-10-PCS | Mod: ,,, | Performed by: STUDENT IN AN ORGANIZED HEALTH CARE EDUCATION/TRAINING PROGRAM

## 2021-04-19 ENCOUNTER — CLINICAL SUPPORT (OUTPATIENT)
Dept: REHABILITATION | Facility: HOSPITAL | Age: 46
End: 2021-04-19
Payer: MEDICARE

## 2021-04-19 DIAGNOSIS — R41.841 COGNITIVE COMMUNICATION DEFICIT: Primary | ICD-10-CM

## 2021-04-19 PROCEDURE — 97130 THER IVNTJ EA ADDL 15 MIN: CPT | Mod: PO

## 2021-04-19 PROCEDURE — 97129 THER IVNTJ 1ST 15 MIN: CPT | Mod: PO

## 2021-04-22 ENCOUNTER — CLINICAL SUPPORT (OUTPATIENT)
Dept: REHABILITATION | Facility: HOSPITAL | Age: 46
End: 2021-04-22
Payer: MEDICARE

## 2021-04-22 DIAGNOSIS — R41.841 COGNITIVE COMMUNICATION DEFICIT: Primary | ICD-10-CM

## 2021-04-22 PROCEDURE — 97130 THER IVNTJ EA ADDL 15 MIN: CPT | Mod: PO

## 2021-04-22 PROCEDURE — 97129 THER IVNTJ 1ST 15 MIN: CPT | Mod: PO

## 2021-04-26 ENCOUNTER — DOCUMENTATION ONLY (OUTPATIENT)
Dept: REHABILITATION | Facility: HOSPITAL | Age: 46
End: 2021-04-26

## 2021-04-26 ENCOUNTER — OFFICE VISIT (OUTPATIENT)
Dept: NEPHROLOGY | Facility: CLINIC | Age: 46
End: 2021-04-26
Payer: MEDICARE

## 2021-04-26 VITALS
BODY MASS INDEX: 19.92 KG/M2 | SYSTOLIC BLOOD PRESSURE: 102 MMHG | DIASTOLIC BLOOD PRESSURE: 60 MMHG | WEIGHT: 112.44 LBS | HEIGHT: 63 IN

## 2021-04-26 DIAGNOSIS — R41.841 COGNITIVE COMMUNICATION DEFICIT: Primary | ICD-10-CM

## 2021-04-26 DIAGNOSIS — M32.14 LUPUS NEPHRITIS: Primary | ICD-10-CM

## 2021-04-26 PROCEDURE — 99999 PR PBB SHADOW E&M-EST. PATIENT-LVL III: ICD-10-PCS | Mod: PBBFAC,,, | Performed by: INTERNAL MEDICINE

## 2021-04-26 PROCEDURE — 99205 PR OFFICE/OUTPT VISIT, NEW, LEVL V, 60-74 MIN: ICD-10-PCS | Mod: S$PBB,,, | Performed by: INTERNAL MEDICINE

## 2021-04-26 PROCEDURE — 99999 PR PBB SHADOW E&M-EST. PATIENT-LVL III: CPT | Mod: PBBFAC,,, | Performed by: INTERNAL MEDICINE

## 2021-04-26 PROCEDURE — 99205 OFFICE O/P NEW HI 60 MIN: CPT | Mod: S$PBB,,, | Performed by: INTERNAL MEDICINE

## 2021-04-26 PROCEDURE — 99213 OFFICE O/P EST LOW 20 MIN: CPT | Mod: PBBFAC | Performed by: INTERNAL MEDICINE

## 2021-04-26 RX ORDER — LISINOPRIL 20 MG/1
20 TABLET ORAL DAILY
COMMUNITY
End: 2021-07-19 | Stop reason: ALTCHOICE

## 2021-04-27 ENCOUNTER — TELEPHONE (OUTPATIENT)
Dept: NEPHROLOGY | Facility: CLINIC | Age: 46
End: 2021-04-27

## 2021-04-28 ENCOUNTER — LAB VISIT (OUTPATIENT)
Dept: LAB | Facility: OTHER | Age: 46
End: 2021-04-28
Payer: MEDICARE

## 2021-04-28 ENCOUNTER — TELEPHONE (OUTPATIENT)
Dept: NEPHROLOGY | Facility: CLINIC | Age: 46
End: 2021-04-28

## 2021-04-28 DIAGNOSIS — M32.14 LUPUS NEPHRITIS: ICD-10-CM

## 2021-04-28 LAB
ALBUMIN SERPL BCP-MCNC: 3.8 G/DL (ref 3.5–5.2)
ANION GAP SERPL CALC-SCNC: 10 MMOL/L (ref 8–16)
BUN SERPL-MCNC: 40 MG/DL (ref 6–20)
CALCIUM SERPL-MCNC: 9.5 MG/DL (ref 8.7–10.5)
CHLORIDE SERPL-SCNC: 103 MMOL/L (ref 95–110)
CK SERPL-CCNC: 46 U/L (ref 20–180)
CO2 SERPL-SCNC: 21 MMOL/L (ref 23–29)
CREAT SERPL-MCNC: 1.5 MG/DL (ref 0.5–1.4)
EST. GFR  (AFRICAN AMERICAN): 49 ML/MIN/1.73 M^2
EST. GFR  (NON AFRICAN AMERICAN): 42 ML/MIN/1.73 M^2
GLUCOSE SERPL-MCNC: 95 MG/DL (ref 70–110)
PHOSPHATE SERPL-MCNC: 5 MG/DL (ref 2.7–4.5)
POTASSIUM SERPL-SCNC: 5.4 MMOL/L (ref 3.5–5.1)
SODIUM SERPL-SCNC: 134 MMOL/L (ref 136–145)

## 2021-04-28 PROCEDURE — 36415 COLL VENOUS BLD VENIPUNCTURE: CPT | Performed by: INTERNAL MEDICINE

## 2021-04-28 PROCEDURE — 80069 RENAL FUNCTION PANEL: CPT | Performed by: INTERNAL MEDICINE

## 2021-04-28 PROCEDURE — 87799 DETECT AGENT NOS DNA QUANT: CPT | Performed by: INTERNAL MEDICINE

## 2021-04-28 PROCEDURE — 82550 ASSAY OF CK (CPK): CPT | Performed by: INTERNAL MEDICINE

## 2021-04-30 ENCOUNTER — CLINICAL SUPPORT (OUTPATIENT)
Dept: REHABILITATION | Facility: HOSPITAL | Age: 46
End: 2021-04-30
Payer: MEDICARE

## 2021-04-30 DIAGNOSIS — R41.841 COGNITIVE COMMUNICATION DEFICIT: Primary | ICD-10-CM

## 2021-04-30 LAB — CMV DNA SERPL NAA+PROBE-ACNC: NORMAL IU/ML

## 2021-04-30 PROCEDURE — 97130 THER IVNTJ EA ADDL 15 MIN: CPT | Mod: PO

## 2021-04-30 PROCEDURE — 97129 THER IVNTJ 1ST 15 MIN: CPT | Mod: PO

## 2021-05-01 LAB
B19V DNA # SPEC NAA+PROBE: <100 COPIES/ML
SPECIMEN SOURCE: NORMAL

## 2021-05-03 ENCOUNTER — CLINICAL SUPPORT (OUTPATIENT)
Dept: REHABILITATION | Facility: HOSPITAL | Age: 46
End: 2021-05-03
Payer: MEDICARE

## 2021-05-03 DIAGNOSIS — R41.841 COGNITIVE COMMUNICATION DEFICIT: Primary | ICD-10-CM

## 2021-05-03 PROCEDURE — 97129 THER IVNTJ 1ST 15 MIN: CPT | Mod: PO

## 2021-05-03 PROCEDURE — 97130 THER IVNTJ EA ADDL 15 MIN: CPT | Mod: PO

## 2021-05-06 ENCOUNTER — TELEPHONE (OUTPATIENT)
Dept: REHABILITATION | Facility: HOSPITAL | Age: 46
End: 2021-05-06

## 2021-05-06 ENCOUNTER — OFFICE VISIT (OUTPATIENT)
Dept: NEUROLOGY | Facility: CLINIC | Age: 46
End: 2021-05-06
Payer: MEDICARE

## 2021-05-06 ENCOUNTER — TELEPHONE (OUTPATIENT)
Dept: OPHTHALMOLOGY | Facility: CLINIC | Age: 46
End: 2021-05-06

## 2021-05-06 ENCOUNTER — DOCUMENTATION ONLY (OUTPATIENT)
Dept: REHABILITATION | Facility: HOSPITAL | Age: 46
End: 2021-05-06

## 2021-05-06 ENCOUNTER — LAB VISIT (OUTPATIENT)
Dept: LAB | Facility: HOSPITAL | Age: 46
End: 2021-05-06
Payer: MEDICARE

## 2021-05-06 VITALS
DIASTOLIC BLOOD PRESSURE: 85 MMHG | HEART RATE: 92 BPM | SYSTOLIC BLOOD PRESSURE: 120 MMHG | BODY MASS INDEX: 19.54 KG/M2 | WEIGHT: 110.25 LBS | HEIGHT: 63 IN

## 2021-05-06 DIAGNOSIS — I63.9: ICD-10-CM

## 2021-05-06 DIAGNOSIS — R41.841 COGNITIVE COMMUNICATION DEFICIT: Primary | ICD-10-CM

## 2021-05-06 DIAGNOSIS — M32.9 SYSTEMIC LUPUS ERYTHEMATOSUS, UNSPECIFIED SLE TYPE, UNSPECIFIED ORGAN INVOLVEMENT STATUS: ICD-10-CM

## 2021-05-06 DIAGNOSIS — M32.9 SYSTEMIC LUPUS ERYTHEMATOSUS, UNSPECIFIED SLE TYPE, UNSPECIFIED ORGAN INVOLVEMENT STATUS: Primary | ICD-10-CM

## 2021-05-06 PROCEDURE — 36415 COLL VENOUS BLD VENIPUNCTURE: CPT | Performed by: STUDENT IN AN ORGANIZED HEALTH CARE EDUCATION/TRAINING PROGRAM

## 2021-05-06 PROCEDURE — 99999 PR PBB SHADOW E&M-EST. PATIENT-LVL III: CPT | Mod: PBBFAC,GC,, | Performed by: STUDENT IN AN ORGANIZED HEALTH CARE EDUCATION/TRAINING PROGRAM

## 2021-05-06 PROCEDURE — 85613 RUSSELL VIPER VENOM DILUTED: CPT | Performed by: STUDENT IN AN ORGANIZED HEALTH CARE EDUCATION/TRAINING PROGRAM

## 2021-05-06 PROCEDURE — 99214 PR OFFICE/OUTPT VISIT, EST, LEVL IV, 30-39 MIN: ICD-10-PCS | Mod: S$PBB,GC,, | Performed by: STUDENT IN AN ORGANIZED HEALTH CARE EDUCATION/TRAINING PROGRAM

## 2021-05-06 PROCEDURE — 86147 CARDIOLIPIN ANTIBODY EA IG: CPT | Performed by: STUDENT IN AN ORGANIZED HEALTH CARE EDUCATION/TRAINING PROGRAM

## 2021-05-06 PROCEDURE — 86146 BETA-2 GLYCOPROTEIN ANTIBODY: CPT | Mod: 59 | Performed by: STUDENT IN AN ORGANIZED HEALTH CARE EDUCATION/TRAINING PROGRAM

## 2021-05-06 PROCEDURE — 99214 OFFICE O/P EST MOD 30 MIN: CPT | Mod: S$PBB,GC,, | Performed by: STUDENT IN AN ORGANIZED HEALTH CARE EDUCATION/TRAINING PROGRAM

## 2021-05-06 PROCEDURE — 99213 OFFICE O/P EST LOW 20 MIN: CPT | Mod: PBBFAC | Performed by: STUDENT IN AN ORGANIZED HEALTH CARE EDUCATION/TRAINING PROGRAM

## 2021-05-06 PROCEDURE — 99999 PR PBB SHADOW E&M-EST. PATIENT-LVL III: ICD-10-PCS | Mod: PBBFAC,GC,, | Performed by: STUDENT IN AN ORGANIZED HEALTH CARE EDUCATION/TRAINING PROGRAM

## 2021-05-07 ENCOUNTER — OFFICE VISIT (OUTPATIENT)
Dept: NEPHROLOGY | Facility: CLINIC | Age: 46
End: 2021-05-07
Payer: MEDICARE

## 2021-05-07 ENCOUNTER — LAB VISIT (OUTPATIENT)
Dept: LAB | Facility: HOSPITAL | Age: 46
End: 2021-05-07
Attending: INTERNAL MEDICINE
Payer: MEDICARE

## 2021-05-07 VITALS
DIASTOLIC BLOOD PRESSURE: 58 MMHG | BODY MASS INDEX: 19.41 KG/M2 | HEART RATE: 61 BPM | HEIGHT: 63 IN | SYSTOLIC BLOOD PRESSURE: 122 MMHG | WEIGHT: 109.56 LBS

## 2021-05-07 DIAGNOSIS — M32.14 LUPUS NEPHRITIS: Primary | ICD-10-CM

## 2021-05-07 DIAGNOSIS — M32.14 LUPUS NEPHRITIS: ICD-10-CM

## 2021-05-07 DIAGNOSIS — N17.9 AKI (ACUTE KIDNEY INJURY): ICD-10-CM

## 2021-05-07 DIAGNOSIS — N18.2 CKD STAGE G2/A2, GFR 60-89 AND ALBUMIN CREATININE RATIO 30-299 MG/G: ICD-10-CM

## 2021-05-07 DIAGNOSIS — R63.4 WEIGHT LOSS: ICD-10-CM

## 2021-05-07 DIAGNOSIS — I63.9 ISCHEMIC STROKE: ICD-10-CM

## 2021-05-07 LAB
ALBUMIN SERPL BCP-MCNC: 3.9 G/DL (ref 3.5–5.2)
ANION GAP SERPL CALC-SCNC: 11 MMOL/L (ref 8–16)
BASOPHILS # BLD AUTO: 0.01 K/UL (ref 0–0.2)
BASOPHILS NFR BLD: 0.3 % (ref 0–1.9)
BUN SERPL-MCNC: 28 MG/DL (ref 6–20)
CALCIUM SERPL-MCNC: 9.9 MG/DL (ref 8.7–10.5)
CHLORIDE SERPL-SCNC: 104 MMOL/L (ref 95–110)
CO2 SERPL-SCNC: 21 MMOL/L (ref 23–29)
CREAT SERPL-MCNC: 1.3 MG/DL (ref 0.5–1.4)
DIFFERENTIAL METHOD: ABNORMAL
EOSINOPHIL # BLD AUTO: 0 K/UL (ref 0–0.5)
EOSINOPHIL NFR BLD: 0 % (ref 0–8)
ERYTHROCYTE [DISTWIDTH] IN BLOOD BY AUTOMATED COUNT: 14.6 % (ref 11.5–14.5)
EST. GFR  (AFRICAN AMERICAN): 58.1 ML/MIN/1.73 M^2
EST. GFR  (NON AFRICAN AMERICAN): 50.4 ML/MIN/1.73 M^2
GLUCOSE SERPL-MCNC: 90 MG/DL (ref 70–110)
HCT VFR BLD AUTO: 30.8 % (ref 37–48.5)
HGB BLD-MCNC: 9.7 G/DL (ref 12–16)
IMM GRANULOCYTES # BLD AUTO: 0 K/UL (ref 0–0.04)
IMM GRANULOCYTES NFR BLD AUTO: 0 % (ref 0–0.5)
LYMPHOCYTES # BLD AUTO: 1 K/UL (ref 1–4.8)
LYMPHOCYTES NFR BLD: 28.6 % (ref 18–48)
MCH RBC QN AUTO: 25 PG (ref 27–31)
MCHC RBC AUTO-ENTMCNC: 31.5 G/DL (ref 32–36)
MCV RBC AUTO: 79 FL (ref 82–98)
MONOCYTES # BLD AUTO: 0.4 K/UL (ref 0.3–1)
MONOCYTES NFR BLD: 10.1 % (ref 4–15)
NEUTROPHILS # BLD AUTO: 2.1 K/UL (ref 1.8–7.7)
NEUTROPHILS NFR BLD: 61 % (ref 38–73)
NRBC BLD-RTO: 0 /100 WBC
PHOSPHATE SERPL-MCNC: 5.1 MG/DL (ref 2.7–4.5)
PLATELET # BLD AUTO: 189 K/UL (ref 150–450)
PMV BLD AUTO: 11.5 FL (ref 9.2–12.9)
POTASSIUM SERPL-SCNC: 4.8 MMOL/L (ref 3.5–5.1)
RBC # BLD AUTO: 3.88 M/UL (ref 4–5.4)
SODIUM SERPL-SCNC: 136 MMOL/L (ref 136–145)
WBC # BLD AUTO: 3.46 K/UL (ref 3.9–12.7)

## 2021-05-07 PROCEDURE — 99215 PR OFFICE/OUTPT VISIT, EST, LEVL V, 40-54 MIN: ICD-10-PCS | Mod: S$PBB,,, | Performed by: INTERNAL MEDICINE

## 2021-05-07 PROCEDURE — 86225 DNA ANTIBODY NATIVE: CPT | Mod: 59 | Performed by: INTERNAL MEDICINE

## 2021-05-07 PROCEDURE — 80069 RENAL FUNCTION PANEL: CPT | Performed by: INTERNAL MEDICINE

## 2021-05-07 PROCEDURE — 86225 DNA ANTIBODY NATIVE: CPT | Performed by: INTERNAL MEDICINE

## 2021-05-07 PROCEDURE — 99213 OFFICE O/P EST LOW 20 MIN: CPT | Mod: PBBFAC | Performed by: INTERNAL MEDICINE

## 2021-05-07 PROCEDURE — 36415 COLL VENOUS BLD VENIPUNCTURE: CPT | Performed by: INTERNAL MEDICINE

## 2021-05-07 PROCEDURE — 99999 PR PBB SHADOW E&M-EST. PATIENT-LVL III: ICD-10-PCS | Mod: PBBFAC,,, | Performed by: INTERNAL MEDICINE

## 2021-05-07 PROCEDURE — 99215 OFFICE O/P EST HI 40 MIN: CPT | Mod: S$PBB,,, | Performed by: INTERNAL MEDICINE

## 2021-05-07 PROCEDURE — 99999 PR PBB SHADOW E&M-EST. PATIENT-LVL III: CPT | Mod: PBBFAC,,, | Performed by: INTERNAL MEDICINE

## 2021-05-07 PROCEDURE — 85025 COMPLETE CBC W/AUTO DIFF WBC: CPT | Performed by: INTERNAL MEDICINE

## 2021-05-10 ENCOUNTER — CLINICAL SUPPORT (OUTPATIENT)
Dept: REHABILITATION | Facility: HOSPITAL | Age: 46
End: 2021-05-10
Payer: MEDICARE

## 2021-05-10 DIAGNOSIS — R41.841 COGNITIVE COMMUNICATION DEFICIT: Primary | ICD-10-CM

## 2021-05-10 LAB
CARDIOLIPIN IGG SER IA-ACNC: <9.4 GPL (ref 0–14.99)
CARDIOLIPIN IGM SER IA-ACNC: <9.4 MPL (ref 0–12.49)

## 2021-05-10 PROCEDURE — 97130 THER IVNTJ EA ADDL 15 MIN: CPT | Mod: PO

## 2021-05-10 PROCEDURE — 97129 THER IVNTJ 1ST 15 MIN: CPT | Mod: PO

## 2021-05-11 LAB
B2 GLYCOPROT1 IGA SER QL: 55 SAU
B2 GLYCOPROT1 IGG SER QL: <9 SGU
B2 GLYCOPROT1 IGM SER QL: <9 SMU
DNA TITER: NORMAL
DSDNA AB SER-ACNC: POSITIVE [IU]/ML
LA PPP-IMP: NEGATIVE

## 2021-05-12 ENCOUNTER — CLINICAL SUPPORT (OUTPATIENT)
Dept: REHABILITATION | Facility: HOSPITAL | Age: 46
End: 2021-05-12
Payer: MEDICARE

## 2021-05-12 DIAGNOSIS — R41.841 COGNITIVE COMMUNICATION DEFICIT: Primary | ICD-10-CM

## 2021-05-12 PROCEDURE — 97130 THER IVNTJ EA ADDL 15 MIN: CPT | Mod: PO

## 2021-05-12 PROCEDURE — 97129 THER IVNTJ 1ST 15 MIN: CPT | Mod: PO

## 2021-05-17 ENCOUNTER — CLINICAL SUPPORT (OUTPATIENT)
Dept: REHABILITATION | Facility: HOSPITAL | Age: 46
End: 2021-05-17
Payer: MEDICARE

## 2021-05-17 ENCOUNTER — TELEPHONE (OUTPATIENT)
Dept: NEUROLOGY | Facility: HOSPITAL | Age: 46
End: 2021-05-17

## 2021-05-17 DIAGNOSIS — M32.9 SYSTEMIC LUPUS ERYTHEMATOSUS, UNSPECIFIED SLE TYPE, UNSPECIFIED ORGAN INVOLVEMENT STATUS: ICD-10-CM

## 2021-05-17 DIAGNOSIS — R41.841 COGNITIVE COMMUNICATION DEFICIT: Primary | ICD-10-CM

## 2021-05-17 DIAGNOSIS — I63.9 ISCHEMIC STROKE: Primary | ICD-10-CM

## 2021-05-17 PROCEDURE — 97129 THER IVNTJ 1ST 15 MIN: CPT | Mod: PO

## 2021-05-17 PROCEDURE — 97130 THER IVNTJ EA ADDL 15 MIN: CPT | Mod: PO

## 2021-05-21 ENCOUNTER — CLINICAL SUPPORT (OUTPATIENT)
Dept: REHABILITATION | Facility: HOSPITAL | Age: 46
End: 2021-05-21
Payer: MEDICARE

## 2021-05-21 DIAGNOSIS — R41.841 COGNITIVE COMMUNICATION DEFICIT: ICD-10-CM

## 2021-05-21 PROCEDURE — 97130 THER IVNTJ EA ADDL 15 MIN: CPT | Mod: PO

## 2021-05-21 PROCEDURE — 97129 THER IVNTJ 1ST 15 MIN: CPT | Mod: PO

## 2021-05-24 ENCOUNTER — CLINICAL SUPPORT (OUTPATIENT)
Dept: REHABILITATION | Facility: HOSPITAL | Age: 46
End: 2021-05-24
Payer: MEDICARE

## 2021-05-24 DIAGNOSIS — R41.841 COGNITIVE COMMUNICATION DEFICIT: Primary | ICD-10-CM

## 2021-05-24 PROCEDURE — 97130 THER IVNTJ EA ADDL 15 MIN: CPT | Mod: PO

## 2021-05-24 PROCEDURE — 97129 THER IVNTJ 1ST 15 MIN: CPT | Mod: PO

## 2021-05-25 ENCOUNTER — HOME CARE VISIT (OUTPATIENT)
Dept: NEUROLOGY | Facility: HOSPITAL | Age: 46
End: 2021-05-25

## 2021-05-25 VITALS
BODY MASS INDEX: 19.15 KG/M2 | WEIGHT: 108.13 LBS | HEART RATE: 77 BPM | OXYGEN SATURATION: 98 % | SYSTOLIC BLOOD PRESSURE: 110 MMHG | DIASTOLIC BLOOD PRESSURE: 62 MMHG

## 2021-05-31 ENCOUNTER — CLINICAL SUPPORT (OUTPATIENT)
Dept: REHABILITATION | Facility: HOSPITAL | Age: 46
End: 2021-05-31
Payer: MEDICARE

## 2021-05-31 DIAGNOSIS — R41.841 COGNITIVE COMMUNICATION DEFICIT: Primary | ICD-10-CM

## 2021-05-31 PROCEDURE — 97129 THER IVNTJ 1ST 15 MIN: CPT | Mod: PO

## 2021-05-31 PROCEDURE — 97130 THER IVNTJ EA ADDL 15 MIN: CPT | Mod: PO

## 2021-06-02 ENCOUNTER — OFFICE VISIT (OUTPATIENT)
Dept: OPHTHALMOLOGY | Facility: CLINIC | Age: 46
End: 2021-06-02
Payer: MEDICARE

## 2021-06-02 ENCOUNTER — TELEPHONE (OUTPATIENT)
Dept: OPHTHALMOLOGY | Facility: CLINIC | Age: 46
End: 2021-06-02

## 2021-06-02 ENCOUNTER — CLINICAL SUPPORT (OUTPATIENT)
Dept: OPHTHALMOLOGY | Facility: CLINIC | Age: 46
End: 2021-06-02
Payer: MEDICARE

## 2021-06-02 DIAGNOSIS — H47.293 OTHER OPTIC ATROPHY, BILATERAL: ICD-10-CM

## 2021-06-02 DIAGNOSIS — Q07.8 ANOMALOUS OPTIC NERVE: ICD-10-CM

## 2021-06-02 DIAGNOSIS — Z86.73 HISTORY OF STROKE: ICD-10-CM

## 2021-06-02 DIAGNOSIS — H35.383 TOXIC MACULOPATHY, BILATERAL: ICD-10-CM

## 2021-06-02 DIAGNOSIS — Z51.81 MEDICATION MONITORING ENCOUNTER: ICD-10-CM

## 2021-06-02 DIAGNOSIS — H53.461 HOMONYMOUS HEMIANOPIA, RIGHT: Primary | ICD-10-CM

## 2021-06-02 DIAGNOSIS — M32.9 SYSTEMIC LUPUS ERYTHEMATOSUS, UNSPECIFIED SLE TYPE, UNSPECIFIED ORGAN INVOLVEMENT STATUS: ICD-10-CM

## 2021-06-02 PROCEDURE — 99999 PR PBB SHADOW E&M-EST. PATIENT-LVL III: CPT | Mod: PBBFAC,,, | Performed by: STUDENT IN AN ORGANIZED HEALTH CARE EDUCATION/TRAINING PROGRAM

## 2021-06-02 PROCEDURE — 92133 OCT, OPTIC NERVE - OU - BOTH EYES: ICD-10-PCS | Mod: 26,S$PBB,, | Performed by: STUDENT IN AN ORGANIZED HEALTH CARE EDUCATION/TRAINING PROGRAM

## 2021-06-02 PROCEDURE — 92083 HUMPHREY VISUAL FIELD - OU - BOTH EYES: ICD-10-PCS | Mod: 26,S$PBB,, | Performed by: STUDENT IN AN ORGANIZED HEALTH CARE EDUCATION/TRAINING PROGRAM

## 2021-06-02 PROCEDURE — 92134 CPTRZ OPH DX IMG PST SGM RTA: CPT | Mod: PBBFAC | Performed by: STUDENT IN AN ORGANIZED HEALTH CARE EDUCATION/TRAINING PROGRAM

## 2021-06-02 PROCEDURE — 99999 PR PBB SHADOW E&M-EST. PATIENT-LVL III: ICD-10-PCS | Mod: PBBFAC,,, | Performed by: STUDENT IN AN ORGANIZED HEALTH CARE EDUCATION/TRAINING PROGRAM

## 2021-06-02 PROCEDURE — 99213 OFFICE O/P EST LOW 20 MIN: CPT | Mod: PBBFAC | Performed by: STUDENT IN AN ORGANIZED HEALTH CARE EDUCATION/TRAINING PROGRAM

## 2021-06-02 PROCEDURE — 92133 CPTRZD OPH DX IMG PST SGM ON: CPT | Mod: PBBFAC | Performed by: STUDENT IN AN ORGANIZED HEALTH CARE EDUCATION/TRAINING PROGRAM

## 2021-06-02 PROCEDURE — 99215 OFFICE O/P EST HI 40 MIN: CPT | Mod: S$PBB,,, | Performed by: STUDENT IN AN ORGANIZED HEALTH CARE EDUCATION/TRAINING PROGRAM

## 2021-06-02 PROCEDURE — 99215 PR OFFICE/OUTPT VISIT, EST, LEVL V, 40-54 MIN: ICD-10-PCS | Mod: S$PBB,,, | Performed by: STUDENT IN AN ORGANIZED HEALTH CARE EDUCATION/TRAINING PROGRAM

## 2021-06-02 PROCEDURE — 92083 EXTENDED VISUAL FIELD XM: CPT | Mod: PBBFAC | Performed by: STUDENT IN AN ORGANIZED HEALTH CARE EDUCATION/TRAINING PROGRAM

## 2021-06-10 ENCOUNTER — OFFICE VISIT (OUTPATIENT)
Dept: HEMATOLOGY/ONCOLOGY | Facility: CLINIC | Age: 46
End: 2021-06-10
Payer: MEDICARE

## 2021-06-10 ENCOUNTER — LAB VISIT (OUTPATIENT)
Dept: LAB | Facility: HOSPITAL | Age: 46
End: 2021-06-10
Attending: INTERNAL MEDICINE
Payer: MEDICARE

## 2021-06-10 VITALS
RESPIRATION RATE: 16 BRPM | HEIGHT: 63 IN | BODY MASS INDEX: 19.82 KG/M2 | SYSTOLIC BLOOD PRESSURE: 135 MMHG | DIASTOLIC BLOOD PRESSURE: 94 MMHG | WEIGHT: 111.88 LBS | HEART RATE: 74 BPM | TEMPERATURE: 98 F

## 2021-06-10 DIAGNOSIS — R20.2 PARESTHESIA OF SKIN: ICD-10-CM

## 2021-06-10 DIAGNOSIS — D64.9 ANEMIA OF UNKNOWN ETIOLOGY: ICD-10-CM

## 2021-06-10 DIAGNOSIS — H53.461 HOMONYMOUS HEMIANOPIA, RIGHT: ICD-10-CM

## 2021-06-10 DIAGNOSIS — I63.9 ISCHEMIC STROKE: ICD-10-CM

## 2021-06-10 DIAGNOSIS — N18.9 CHRONIC KIDNEY DISEASE, UNSPECIFIED CKD STAGE: ICD-10-CM

## 2021-06-10 DIAGNOSIS — M32.9 SYSTEMIC LUPUS ERYTHEMATOSUS, UNSPECIFIED SLE TYPE, UNSPECIFIED ORGAN INVOLVEMENT STATUS: ICD-10-CM

## 2021-06-10 DIAGNOSIS — R71.8 OTHER ABNORMALITY OF RED BLOOD CELLS: ICD-10-CM

## 2021-06-10 DIAGNOSIS — I10 ESSENTIAL HYPERTENSION: Primary | ICD-10-CM

## 2021-06-10 LAB
BASOPHILS # BLD AUTO: 0.02 K/UL (ref 0–0.2)
BASOPHILS NFR BLD: 0.5 % (ref 0–1.9)
DAT IGG-SP REAG RBC-IMP: NORMAL
DIFFERENTIAL METHOD: ABNORMAL
EOSINOPHIL # BLD AUTO: 0 K/UL (ref 0–0.5)
EOSINOPHIL NFR BLD: 0.5 % (ref 0–8)
ERYTHROCYTE [DISTWIDTH] IN BLOOD BY AUTOMATED COUNT: 15.3 % (ref 11.5–14.5)
ERYTHROCYTE [DISTWIDTH] IN BLOOD BY AUTOMATED COUNT: 15.3 % (ref 11.5–14.5)
FERRITIN SERPL-MCNC: 56 NG/ML (ref 20–300)
FOLATE SERPL-MCNC: 7.4 NG/ML (ref 4–24)
HAPTOGLOB SERPL-MCNC: 136 MG/DL (ref 30–250)
HCT VFR BLD AUTO: 30.4 % (ref 37–48.5)
HCT VFR BLD AUTO: 30.4 % (ref 37–48.5)
HGB BLD-MCNC: 9.3 G/DL (ref 12–16)
HGB BLD-MCNC: 9.3 G/DL (ref 12–16)
IMM GRANULOCYTES # BLD AUTO: 0.01 K/UL (ref 0–0.04)
IMM GRANULOCYTES # BLD AUTO: 0.01 K/UL (ref 0–0.04)
IMM GRANULOCYTES NFR BLD AUTO: 0.3 % (ref 0–0.5)
IRON SERPL-MCNC: 57 UG/DL (ref 30–160)
LDH SERPL L TO P-CCNC: 116 U/L (ref 110–260)
LYMPHOCYTES # BLD AUTO: 1.5 K/UL (ref 1–4.8)
LYMPHOCYTES NFR BLD: 41.2 % (ref 18–48)
MCH RBC QN AUTO: 25.9 PG (ref 27–31)
MCH RBC QN AUTO: 25.9 PG (ref 27–31)
MCHC RBC AUTO-ENTMCNC: 30.6 G/DL (ref 32–36)
MCHC RBC AUTO-ENTMCNC: 30.6 G/DL (ref 32–36)
MCV RBC AUTO: 85 FL (ref 82–98)
MCV RBC AUTO: 85 FL (ref 82–98)
MONOCYTES # BLD AUTO: 0.3 K/UL (ref 0.3–1)
MONOCYTES NFR BLD: 9.3 % (ref 4–15)
NEUTROPHILS # BLD AUTO: 1.8 K/UL (ref 1.8–7.7)
NEUTROPHILS # BLD AUTO: 1.8 K/UL (ref 1.8–7.7)
NEUTROPHILS NFR BLD: 48.2 % (ref 38–73)
NRBC BLD-RTO: 0 /100 WBC
PLATELET # BLD AUTO: 201 K/UL (ref 150–450)
PLATELET # BLD AUTO: 201 K/UL (ref 150–450)
PMV BLD AUTO: 11.2 FL (ref 9.2–12.9)
PMV BLD AUTO: 11.2 FL (ref 9.2–12.9)
RBC # BLD AUTO: 3.59 M/UL (ref 4–5.4)
RBC # BLD AUTO: 3.59 M/UL (ref 4–5.4)
SATURATED IRON: 17 % (ref 20–50)
TOTAL IRON BINDING CAPACITY: 326 UG/DL (ref 250–450)
TRANSFERRIN SERPL-MCNC: 220 MG/DL (ref 200–375)
VIT B12 SERPL-MCNC: 323 PG/ML (ref 210–950)
WBC # BLD AUTO: 3.64 K/UL (ref 3.9–12.7)
WBC # BLD AUTO: 3.64 K/UL (ref 3.9–12.7)

## 2021-06-10 PROCEDURE — 99205 OFFICE O/P NEW HI 60 MIN: CPT | Mod: S$PBB,,, | Performed by: INTERNAL MEDICINE

## 2021-06-10 PROCEDURE — 83615 LACTATE (LD) (LDH) ENZYME: CPT | Performed by: INTERNAL MEDICINE

## 2021-06-10 PROCEDURE — 86880 COOMBS TEST DIRECT: CPT | Performed by: INTERNAL MEDICINE

## 2021-06-10 PROCEDURE — 81240 F2 GENE: CPT | Performed by: INTERNAL MEDICINE

## 2021-06-10 PROCEDURE — 85300 ANTITHROMBIN III ACTIVITY: CPT | Performed by: INTERNAL MEDICINE

## 2021-06-10 PROCEDURE — 82728 ASSAY OF FERRITIN: CPT | Performed by: INTERNAL MEDICINE

## 2021-06-10 PROCEDURE — 82746 ASSAY OF FOLIC ACID SERUM: CPT | Performed by: INTERNAL MEDICINE

## 2021-06-10 PROCEDURE — 99999 PR PBB SHADOW E&M-EST. PATIENT-LVL IV: CPT | Mod: PBBFAC,,, | Performed by: INTERNAL MEDICINE

## 2021-06-10 PROCEDURE — 85303 CLOT INHIBIT PROT C ACTIVITY: CPT | Performed by: INTERNAL MEDICINE

## 2021-06-10 PROCEDURE — 81241 F5 GENE: CPT | Performed by: INTERNAL MEDICINE

## 2021-06-10 PROCEDURE — 99999 PR PBB SHADOW E&M-EST. PATIENT-LVL IV: ICD-10-PCS | Mod: PBBFAC,,, | Performed by: INTERNAL MEDICINE

## 2021-06-10 PROCEDURE — 85025 COMPLETE CBC W/AUTO DIFF WBC: CPT | Performed by: INTERNAL MEDICINE

## 2021-06-10 PROCEDURE — 99205 PR OFFICE/OUTPT VISIT, NEW, LEVL V, 60-74 MIN: ICD-10-PCS | Mod: S$PBB,,, | Performed by: INTERNAL MEDICINE

## 2021-06-10 PROCEDURE — 83010 ASSAY OF HAPTOGLOBIN QUANT: CPT | Performed by: INTERNAL MEDICINE

## 2021-06-10 PROCEDURE — 82607 VITAMIN B-12: CPT | Performed by: INTERNAL MEDICINE

## 2021-06-10 PROCEDURE — 85305 CLOT INHIBIT PROT S TOTAL: CPT | Performed by: INTERNAL MEDICINE

## 2021-06-10 PROCEDURE — 99214 OFFICE O/P EST MOD 30 MIN: CPT | Mod: PBBFAC | Performed by: INTERNAL MEDICINE

## 2021-06-10 PROCEDURE — 83540 ASSAY OF IRON: CPT | Performed by: INTERNAL MEDICINE

## 2021-06-10 PROCEDURE — 86356 MONONUCLEAR CELL ANTIGEN: CPT | Mod: 91 | Performed by: INTERNAL MEDICINE

## 2021-06-10 PROCEDURE — 83021 HEMOGLOBIN CHROMOTOGRAPHY: CPT | Performed by: INTERNAL MEDICINE

## 2021-06-11 ENCOUNTER — CLINICAL SUPPORT (OUTPATIENT)
Dept: REHABILITATION | Facility: HOSPITAL | Age: 46
End: 2021-06-11
Attending: STUDENT IN AN ORGANIZED HEALTH CARE EDUCATION/TRAINING PROGRAM
Payer: MEDICARE

## 2021-06-11 DIAGNOSIS — H53.461 HOMONYMOUS HEMIANOPIA, RIGHT: ICD-10-CM

## 2021-06-11 PROCEDURE — 97165 OT EVAL LOW COMPLEX 30 MIN: CPT | Mod: PO

## 2021-06-12 LAB
HB ELECTROPHORESIS INTERP CANCEL: NORMAL
HB ELECTROPHORESIS INTERPRETATION: NORMAL
HGB A MFR BLD ELPH: 97.4 % (ref 95.8–98)
HGB A2 MFR BLD: 2.6 % (ref 2–3.3)
HGB A2+XXX MFR BLD ELPH: NORMAL %
HGB F MFR BLD: 0 % (ref 0–0.9)
HGB XXX MFR BLD ELPH: NORMAL %
HPLC HB VARIANT: NORMAL

## 2021-06-13 LAB — RBC CD59 DEFICIENT NFR: 0 % (ref 0–0)

## 2021-06-14 LAB
PROT C ACT/NOR PPP CHRO: 91 % (ref 70–150)
PROT S ACT/NOR PPP: 52 % (ref 50–160)

## 2021-06-16 DIAGNOSIS — H53.461 HOMONYMOUS HEMIANOPIA, RIGHT: ICD-10-CM

## 2021-06-16 DIAGNOSIS — M32.9 SYSTEMIC LUPUS ERYTHEMATOSUS, UNSPECIFIED SLE TYPE, UNSPECIFIED ORGAN INVOLVEMENT STATUS: Primary | ICD-10-CM

## 2021-06-17 LAB
AT III ACT/NOR PPP CHRO: 88 % (ref 83–118)
F2 C.20210G>A GENO BLD/T: NORMAL
F2 GENE MUT ANL BLD/T: NORMAL
F5 GENE MUT ANL BLD/T: NORMAL
F5 P.R506Q BLD/T QL: NORMAL

## 2021-06-18 ENCOUNTER — CLINICAL SUPPORT (OUTPATIENT)
Dept: REHABILITATION | Facility: HOSPITAL | Age: 46
End: 2021-06-18
Attending: STUDENT IN AN ORGANIZED HEALTH CARE EDUCATION/TRAINING PROGRAM
Payer: MEDICARE

## 2021-06-18 ENCOUNTER — NUTRITION (OUTPATIENT)
Dept: NUTRITION | Facility: CLINIC | Age: 46
End: 2021-06-18
Payer: MEDICARE

## 2021-06-18 VITALS — HEIGHT: 63 IN | BODY MASS INDEX: 19.92 KG/M2 | WEIGHT: 112.44 LBS

## 2021-06-18 DIAGNOSIS — H53.461 HOMONYMOUS HEMIANOPIA, RIGHT: ICD-10-CM

## 2021-06-18 DIAGNOSIS — R63.4 WEIGHT LOSS: ICD-10-CM

## 2021-06-18 DIAGNOSIS — I63.9 ISCHEMIC STROKE: ICD-10-CM

## 2021-06-18 DIAGNOSIS — Z71.3 DIETARY COUNSELING: ICD-10-CM

## 2021-06-18 DIAGNOSIS — N18.2 CKD STAGE G2/A2, GFR 60-89 AND ALBUMIN CREATININE RATIO 30-299 MG/G: Primary | ICD-10-CM

## 2021-06-18 PROCEDURE — 97530 THERAPEUTIC ACTIVITIES: CPT | Mod: PO

## 2021-06-18 PROCEDURE — 99213 OFFICE O/P EST LOW 20 MIN: CPT | Mod: PBBFAC

## 2021-06-18 PROCEDURE — 97802 MEDICAL NUTRITION INDIV IN: CPT | Mod: PBBFAC,59 | Performed by: DIETITIAN, REGISTERED

## 2021-06-18 PROCEDURE — 99999 PR PBB SHADOW E&M-EST. PATIENT-LVL III: CPT | Mod: PBBFAC,,,

## 2021-06-18 PROCEDURE — 97112 NEUROMUSCULAR REEDUCATION: CPT | Mod: PO

## 2021-06-18 PROCEDURE — 99999 PR PBB SHADOW E&M-EST. PATIENT-LVL III: ICD-10-PCS | Mod: PBBFAC,,,

## 2021-06-29 ENCOUNTER — HOME CARE VISIT (OUTPATIENT)
Dept: NEUROLOGY | Facility: HOSPITAL | Age: 46
End: 2021-06-29

## 2021-07-01 ENCOUNTER — PATIENT MESSAGE (OUTPATIENT)
Dept: ADMINISTRATIVE | Facility: OTHER | Age: 46
End: 2021-07-01

## 2021-07-02 ENCOUNTER — DOCUMENTATION ONLY (OUTPATIENT)
Dept: REHABILITATION | Facility: HOSPITAL | Age: 46
End: 2021-07-02

## 2021-07-14 VITALS
SYSTOLIC BLOOD PRESSURE: 120 MMHG | BODY MASS INDEX: 19.95 KG/M2 | DIASTOLIC BLOOD PRESSURE: 68 MMHG | WEIGHT: 112.63 LBS | HEART RATE: 74 BPM

## 2021-07-19 ENCOUNTER — OFFICE VISIT (OUTPATIENT)
Dept: NEPHROLOGY | Facility: CLINIC | Age: 46
End: 2021-07-19
Payer: MEDICARE

## 2021-07-19 ENCOUNTER — LAB VISIT (OUTPATIENT)
Dept: LAB | Facility: HOSPITAL | Age: 46
End: 2021-07-19
Attending: INTERNAL MEDICINE
Payer: MEDICARE

## 2021-07-19 VITALS
HEART RATE: 59 BPM | SYSTOLIC BLOOD PRESSURE: 120 MMHG | DIASTOLIC BLOOD PRESSURE: 60 MMHG | BODY MASS INDEX: 19.21 KG/M2 | WEIGHT: 108.44 LBS | OXYGEN SATURATION: 100 % | HEIGHT: 63 IN

## 2021-07-19 DIAGNOSIS — N17.9 AKI (ACUTE KIDNEY INJURY): ICD-10-CM

## 2021-07-19 DIAGNOSIS — M32.9 SYSTEMIC LUPUS ERYTHEMATOSUS, UNSPECIFIED SLE TYPE, UNSPECIFIED ORGAN INVOLVEMENT STATUS: Primary | ICD-10-CM

## 2021-07-19 DIAGNOSIS — M32.9 SYSTEMIC LUPUS ERYTHEMATOSUS, UNSPECIFIED SLE TYPE, UNSPECIFIED ORGAN INVOLVEMENT STATUS: ICD-10-CM

## 2021-07-19 LAB
ALBUMIN SERPL BCP-MCNC: 3.8 G/DL (ref 3.5–5.2)
ANION GAP SERPL CALC-SCNC: 11 MMOL/L (ref 8–16)
BASOPHILS # BLD AUTO: 0.02 K/UL (ref 0–0.2)
BASOPHILS NFR BLD: 0.5 % (ref 0–1.9)
BUN SERPL-MCNC: 24 MG/DL (ref 6–20)
C3 SERPL-MCNC: 60 MG/DL (ref 50–180)
C4 SERPL-MCNC: 23 MG/DL (ref 11–44)
CALCIUM SERPL-MCNC: 9.6 MG/DL (ref 8.7–10.5)
CHLORIDE SERPL-SCNC: 106 MMOL/L (ref 95–110)
CO2 SERPL-SCNC: 23 MMOL/L (ref 23–29)
CREAT SERPL-MCNC: 1.7 MG/DL (ref 0.5–1.4)
DIFFERENTIAL METHOD: ABNORMAL
EOSINOPHIL # BLD AUTO: 0 K/UL (ref 0–0.5)
EOSINOPHIL NFR BLD: 0 % (ref 0–8)
ERYTHROCYTE [DISTWIDTH] IN BLOOD BY AUTOMATED COUNT: 13.9 % (ref 11.5–14.5)
EST. GFR  (AFRICAN AMERICAN): 42 ML/MIN/1.73 M^2
EST. GFR  (NON AFRICAN AMERICAN): 36.4 ML/MIN/1.73 M^2
GLUCOSE SERPL-MCNC: 106 MG/DL (ref 70–110)
HCT VFR BLD AUTO: 34.8 % (ref 37–48.5)
HGB BLD-MCNC: 10.8 G/DL (ref 12–16)
IMM GRANULOCYTES # BLD AUTO: 0.01 K/UL (ref 0–0.04)
IMM GRANULOCYTES NFR BLD AUTO: 0.3 % (ref 0–0.5)
LYMPHOCYTES # BLD AUTO: 1.1 K/UL (ref 1–4.8)
LYMPHOCYTES NFR BLD: 28.8 % (ref 18–48)
MCH RBC QN AUTO: 25.9 PG (ref 27–31)
MCHC RBC AUTO-ENTMCNC: 31 G/DL (ref 32–36)
MCV RBC AUTO: 84 FL (ref 82–98)
MONOCYTES # BLD AUTO: 0.3 K/UL (ref 0.3–1)
MONOCYTES NFR BLD: 7.3 % (ref 4–15)
NEUTROPHILS # BLD AUTO: 2.4 K/UL (ref 1.8–7.7)
NEUTROPHILS NFR BLD: 63.1 % (ref 38–73)
NRBC BLD-RTO: 0 /100 WBC
PHOSPHATE SERPL-MCNC: 4.7 MG/DL (ref 2.7–4.5)
PLATELET # BLD AUTO: 156 K/UL (ref 150–450)
PMV BLD AUTO: 11.6 FL (ref 9.2–12.9)
POTASSIUM SERPL-SCNC: 4.7 MMOL/L (ref 3.5–5.1)
PTH-INTACT SERPL-MCNC: 53 PG/ML (ref 9–77)
RBC # BLD AUTO: 4.17 M/UL (ref 4–5.4)
SODIUM SERPL-SCNC: 140 MMOL/L (ref 136–145)
WBC # BLD AUTO: 3.86 K/UL (ref 3.9–12.7)

## 2021-07-19 PROCEDURE — 83970 ASSAY OF PARATHORMONE: CPT | Performed by: INTERNAL MEDICINE

## 2021-07-19 PROCEDURE — 85025 COMPLETE CBC W/AUTO DIFF WBC: CPT | Performed by: INTERNAL MEDICINE

## 2021-07-19 PROCEDURE — 36415 COLL VENOUS BLD VENIPUNCTURE: CPT | Performed by: INTERNAL MEDICINE

## 2021-07-19 PROCEDURE — 80069 RENAL FUNCTION PANEL: CPT | Performed by: INTERNAL MEDICINE

## 2021-07-19 PROCEDURE — 86039 ANTINUCLEAR ANTIBODIES (ANA): CPT | Performed by: INTERNAL MEDICINE

## 2021-07-19 PROCEDURE — 99215 PR OFFICE/OUTPT VISIT, EST, LEVL V, 40-54 MIN: ICD-10-PCS | Mod: S$PBB,,, | Performed by: INTERNAL MEDICINE

## 2021-07-19 PROCEDURE — 86038 ANTINUCLEAR ANTIBODIES: CPT | Performed by: INTERNAL MEDICINE

## 2021-07-19 PROCEDURE — 86160 COMPLEMENT ANTIGEN: CPT | Mod: 59 | Performed by: INTERNAL MEDICINE

## 2021-07-19 PROCEDURE — 99999 PR PBB SHADOW E&M-EST. PATIENT-LVL III: ICD-10-PCS | Mod: PBBFAC,,, | Performed by: INTERNAL MEDICINE

## 2021-07-19 PROCEDURE — 86160 COMPLEMENT ANTIGEN: CPT | Performed by: INTERNAL MEDICINE

## 2021-07-19 PROCEDURE — 99215 OFFICE O/P EST HI 40 MIN: CPT | Mod: S$PBB,,, | Performed by: INTERNAL MEDICINE

## 2021-07-19 PROCEDURE — 86235 NUCLEAR ANTIGEN ANTIBODY: CPT | Mod: 59 | Performed by: INTERNAL MEDICINE

## 2021-07-19 PROCEDURE — 99213 OFFICE O/P EST LOW 20 MIN: CPT | Mod: PBBFAC | Performed by: INTERNAL MEDICINE

## 2021-07-19 PROCEDURE — 86225 DNA ANTIBODY NATIVE: CPT | Mod: 59 | Performed by: INTERNAL MEDICINE

## 2021-07-19 PROCEDURE — 99999 PR PBB SHADOW E&M-EST. PATIENT-LVL III: CPT | Mod: PBBFAC,,, | Performed by: INTERNAL MEDICINE

## 2021-07-19 RX ORDER — PREDNISONE 2.5 MG/1
2.5 TABLET ORAL DAILY
Qty: 90 TABLET | Refills: 1 | Status: SHIPPED | OUTPATIENT
Start: 2021-07-19 | End: 2021-07-22

## 2021-07-19 RX ORDER — HYDROXYCHLOROQUINE SULFATE 200 MG/1
400 TABLET, FILM COATED ORAL DAILY
Qty: 180 TABLET | Refills: 1 | Status: SHIPPED | OUTPATIENT
Start: 2021-07-19 | End: 2021-08-14

## 2021-07-19 RX ORDER — MYCOPHENOLATE MOFETIL 500 MG/1
1500 TABLET ORAL 2 TIMES DAILY
Qty: 540 TABLET | Refills: 1 | Status: SHIPPED | OUTPATIENT
Start: 2021-07-19 | End: 2022-04-20 | Stop reason: SDUPTHER

## 2021-07-20 LAB
ANA PATTERN 1: NORMAL
ANA SER QL IF: POSITIVE
ANA TITR SER IF: NORMAL {TITER}

## 2021-07-22 ENCOUNTER — TELEPHONE (OUTPATIENT)
Dept: NEPHROLOGY | Facility: HOSPITAL | Age: 46
End: 2021-07-22

## 2021-07-22 DIAGNOSIS — M32.9 SYSTEMIC LUPUS ERYTHEMATOSUS, UNSPECIFIED SLE TYPE, UNSPECIFIED ORGAN INVOLVEMENT STATUS: ICD-10-CM

## 2021-07-22 RX ORDER — PREDNISONE 20 MG/1
TABLET ORAL
Qty: 65 TABLET | Refills: 0 | Status: SHIPPED | OUTPATIENT
Start: 2021-07-22 | End: 2021-08-21

## 2021-07-22 RX ORDER — SULFAMETHOXAZOLE AND TRIMETHOPRIM 400; 80 MG/1; MG/1
1 TABLET ORAL
Qty: 40 TABLET | Refills: 0 | Status: SHIPPED | OUTPATIENT
Start: 2021-07-23 | End: 2021-11-07 | Stop reason: SDUPTHER

## 2021-07-22 RX ORDER — PANTOPRAZOLE SODIUM 40 MG/1
40 TABLET, DELAYED RELEASE ORAL DAILY
Qty: 90 TABLET | Refills: 0 | Status: SHIPPED | OUTPATIENT
Start: 2021-07-22 | End: 2021-11-07 | Stop reason: SDUPTHER

## 2021-07-23 LAB
ANTI SM ANTIBODY: 1.97 RATIO (ref 0–0.99)
ANTI SM/RNP ANTIBODY: 6.85 RATIO (ref 0–0.99)
ANTI-SM INTERPRETATION: POSITIVE
ANTI-SM/RNP INTERPRETATION: POSITIVE
ANTI-SSA ANTIBODY: 2.44 RATIO (ref 0–0.99)
ANTI-SSA INTERPRETATION: POSITIVE
ANTI-SSB ANTIBODY: 0.08 RATIO (ref 0–0.99)
ANTI-SSB INTERPRETATION: NEGATIVE
DNA TITER: NORMAL
DSDNA AB SER-ACNC: POSITIVE [IU]/ML

## 2021-07-26 ENCOUNTER — OFFICE VISIT (OUTPATIENT)
Dept: NEPHROLOGY | Facility: CLINIC | Age: 46
End: 2021-07-26
Payer: MEDICARE

## 2021-07-26 DIAGNOSIS — M32.9 SYSTEMIC LUPUS ERYTHEMATOSUS, UNSPECIFIED SLE TYPE, UNSPECIFIED ORGAN INVOLVEMENT STATUS: Primary | ICD-10-CM

## 2021-07-26 PROCEDURE — 99212 OFFICE O/P EST SF 10 MIN: CPT | Mod: S$PBB,,, | Performed by: INTERNAL MEDICINE

## 2021-07-26 PROCEDURE — 99212 PR OFFICE/OUTPT VISIT, EST, LEVL II, 10-19 MIN: ICD-10-PCS | Mod: S$PBB,,, | Performed by: INTERNAL MEDICINE

## 2021-07-30 ENCOUNTER — HOME CARE VISIT (OUTPATIENT)
Dept: NEUROLOGY | Facility: HOSPITAL | Age: 46
End: 2021-07-30
Payer: MEDICARE

## 2021-08-01 VITALS
DIASTOLIC BLOOD PRESSURE: 70 MMHG | HEART RATE: 79 BPM | SYSTOLIC BLOOD PRESSURE: 110 MMHG | BODY MASS INDEX: 18.6 KG/M2 | WEIGHT: 105 LBS

## 2021-08-11 ENCOUNTER — TELEPHONE (OUTPATIENT)
Dept: NEPHROLOGY | Facility: CLINIC | Age: 46
End: 2021-08-11

## 2021-08-13 ENCOUNTER — LAB VISIT (OUTPATIENT)
Dept: LAB | Facility: HOSPITAL | Age: 46
End: 2021-08-13
Attending: INTERNAL MEDICINE
Payer: MEDICARE

## 2021-08-13 ENCOUNTER — OFFICE VISIT (OUTPATIENT)
Dept: RHEUMATOLOGY | Facility: CLINIC | Age: 46
End: 2021-08-13
Payer: MEDICARE

## 2021-08-13 VITALS
HEIGHT: 63 IN | SYSTOLIC BLOOD PRESSURE: 123 MMHG | WEIGHT: 115.94 LBS | BODY MASS INDEX: 20.54 KG/M2 | DIASTOLIC BLOOD PRESSURE: 83 MMHG | HEART RATE: 71 BPM

## 2021-08-13 DIAGNOSIS — Z86.73 HISTORY OF MULTIPLE STROKES: ICD-10-CM

## 2021-08-13 DIAGNOSIS — M32.8 OTHER FORMS OF SYSTEMIC LUPUS ERYTHEMATOSUS, UNSPECIFIED ORGAN INVOLVEMENT STATUS: Primary | ICD-10-CM

## 2021-08-13 DIAGNOSIS — M32.8 OTHER FORMS OF SYSTEMIC LUPUS ERYTHEMATOSUS, UNSPECIFIED ORGAN INVOLVEMENT STATUS: ICD-10-CM

## 2021-08-13 DIAGNOSIS — R80.9 PROTEINURIA, UNSPECIFIED TYPE: ICD-10-CM

## 2021-08-13 DIAGNOSIS — N18.32 STAGE 3B CHRONIC KIDNEY DISEASE: ICD-10-CM

## 2021-08-13 DIAGNOSIS — Z55.9 EDUCATIONAL CIRCUMSTANCE: ICD-10-CM

## 2021-08-13 LAB
C3 SERPL-MCNC: 72 MG/DL (ref 50–180)
C4 SERPL-MCNC: 19 MG/DL (ref 11–44)
CCP AB SER IA-ACNC: 5 U/ML
CRP SERPL-MCNC: 1 MG/L (ref 0–8.2)
ERYTHROCYTE [SEDIMENTATION RATE] IN BLOOD BY WESTERGREN METHOD: 106 MM/HR (ref 0–36)
IGA SERPL-MCNC: 979 MG/DL (ref 40–350)
IGG SERPL-MCNC: 2506 MG/DL (ref 650–1600)
IGM SERPL-MCNC: 68 MG/DL (ref 50–300)
RHEUMATOID FACT SERPL-ACNC: <13 IU/ML (ref 0–15)

## 2021-08-13 PROCEDURE — 82784 ASSAY IGA/IGD/IGG/IGM EACH: CPT | Mod: 59 | Performed by: INTERNAL MEDICINE

## 2021-08-13 PROCEDURE — 99999 PR PBB SHADOW E&M-EST. PATIENT-LVL IV: CPT | Mod: PBBFAC,,, | Performed by: INTERNAL MEDICINE

## 2021-08-13 PROCEDURE — 99214 OFFICE O/P EST MOD 30 MIN: CPT | Mod: PBBFAC | Performed by: INTERNAL MEDICINE

## 2021-08-13 PROCEDURE — 99205 OFFICE O/P NEW HI 60 MIN: CPT | Mod: S$PBB,,, | Performed by: INTERNAL MEDICINE

## 2021-08-13 PROCEDURE — 86225 DNA ANTIBODY NATIVE: CPT | Mod: 59 | Performed by: INTERNAL MEDICINE

## 2021-08-13 PROCEDURE — 86225 DNA ANTIBODY NATIVE: CPT | Performed by: INTERNAL MEDICINE

## 2021-08-13 PROCEDURE — 85652 RBC SED RATE AUTOMATED: CPT | Performed by: INTERNAL MEDICINE

## 2021-08-13 PROCEDURE — 85613 RUSSELL VIPER VENOM DILUTED: CPT | Performed by: INTERNAL MEDICINE

## 2021-08-13 PROCEDURE — 86160 COMPLEMENT ANTIGEN: CPT | Mod: 59 | Performed by: INTERNAL MEDICINE

## 2021-08-13 PROCEDURE — 99205 PR OFFICE/OUTPT VISIT, NEW, LEVL V, 60-74 MIN: ICD-10-PCS | Mod: S$PBB,,, | Performed by: INTERNAL MEDICINE

## 2021-08-13 PROCEDURE — 86147 CARDIOLIPIN ANTIBODY EA IG: CPT | Performed by: INTERNAL MEDICINE

## 2021-08-13 PROCEDURE — 86200 CCP ANTIBODY: CPT | Performed by: INTERNAL MEDICINE

## 2021-08-13 PROCEDURE — 86162 COMPLEMENT TOTAL (CH50): CPT | Performed by: INTERNAL MEDICINE

## 2021-08-13 PROCEDURE — 86140 C-REACTIVE PROTEIN: CPT | Performed by: INTERNAL MEDICINE

## 2021-08-13 PROCEDURE — 86431 RHEUMATOID FACTOR QUANT: CPT | Performed by: INTERNAL MEDICINE

## 2021-08-13 PROCEDURE — 36415 COLL VENOUS BLD VENIPUNCTURE: CPT | Performed by: INTERNAL MEDICINE

## 2021-08-13 PROCEDURE — 86160 COMPLEMENT ANTIGEN: CPT | Performed by: INTERNAL MEDICINE

## 2021-08-13 PROCEDURE — 86146 BETA-2 GLYCOPROTEIN ANTIBODY: CPT | Mod: 59 | Performed by: INTERNAL MEDICINE

## 2021-08-13 PROCEDURE — 99999 PR PBB SHADOW E&M-EST. PATIENT-LVL IV: ICD-10-PCS | Mod: PBBFAC,,, | Performed by: INTERNAL MEDICINE

## 2021-08-13 SDOH — SOCIAL DETERMINANTS OF HEALTH (SDOH): PROBLEMS RELATED TO EDUCATION AND LITERACY, UNSPECIFIED: Z55.9

## 2021-08-13 ASSESSMENT — ROUTINE ASSESSMENT OF PATIENT INDEX DATA (RAPID3)
PSYCHOLOGICAL DISTRESS SCORE: 1.1
PATIENT GLOBAL ASSESSMENT SCORE: 0
MDHAQ FUNCTION SCORE: 0.1
AM STIFFNESS SCORE: 0, NO
TOTAL RAPID3 SCORE: 0.61
FATIGUE SCORE: 0
PAIN SCORE: 1.5

## 2021-08-14 ENCOUNTER — TELEPHONE (OUTPATIENT)
Dept: NEPHROLOGY | Facility: CLINIC | Age: 46
End: 2021-08-14

## 2021-08-14 DIAGNOSIS — M32.9 SYSTEMIC LUPUS ERYTHEMATOSUS, UNSPECIFIED SLE TYPE, UNSPECIFIED ORGAN INVOLVEMENT STATUS: ICD-10-CM

## 2021-08-14 DIAGNOSIS — M32.14 LUPUS NEPHRITIS: Primary | ICD-10-CM

## 2021-08-14 RX ORDER — ATORVASTATIN CALCIUM 40 MG/1
40 TABLET, FILM COATED ORAL DAILY
Qty: 90 TABLET | Refills: 0 | Status: SHIPPED | OUTPATIENT
Start: 2021-08-14 | End: 2022-04-20 | Stop reason: SDUPTHER

## 2021-08-14 RX ORDER — HYDROXYCHLOROQUINE SULFATE 200 MG/1
400 TABLET, FILM COATED ORAL
Qty: 180 TABLET | Refills: 1
Start: 2021-08-16 | End: 2021-11-02 | Stop reason: SDUPTHER

## 2021-08-16 ENCOUNTER — TELEPHONE (OUTPATIENT)
Dept: NEPHROLOGY | Facility: CLINIC | Age: 46
End: 2021-08-16

## 2021-08-16 LAB
CARDIOLIPIN IGG SER IA-ACNC: <9.4 GPL (ref 0–14.99)
CARDIOLIPIN IGM SER IA-ACNC: <9.4 MPL (ref 0–12.49)
CH50 SERPL-ACNC: 45 U/ML (ref 42–95)

## 2021-08-17 LAB
APTT IMM NP PPP: NORMAL SEC (ref 32–48)
APTT P HEP NEUT PPP: NORMAL SEC (ref 32–48)
B2 GLYCOPROT1 IGA SER QL: 41 SAU
B2 GLYCOPROT1 IGG SER QL: <9 SGU
B2 GLYCOPROT1 IGM SER QL: <9 SMU
CONFIRM APTT STACLOT: NORMAL
DNA TITER: NORMAL
DRVVT SCREEN TO CONFIRM RATIO: NORMAL RATIO
DSDNA AB SER-ACNC: POSITIVE [IU]/ML
LA 3 SCREEN W REFLEX-IMP: NORMAL
LA NT DPL PPP QL: NORMAL
MIXING DRVVT: NORMAL SEC (ref 33–44)
PROTHROMBIN TIME: 12.1 SEC (ref 12–15.5)
REPTILASE TIME: NORMAL SEC
SCREEN APTT: 37 SEC (ref 32–48)
SCREEN DRVVT: 35 SEC (ref 33–44)
THROMBIN TIME: NORMAL SEC (ref 14.7–19.5)

## 2021-08-19 ENCOUNTER — LAB VISIT (OUTPATIENT)
Dept: LAB | Facility: HOSPITAL | Age: 46
End: 2021-08-19
Attending: INTERNAL MEDICINE
Payer: MEDICARE

## 2021-08-19 DIAGNOSIS — M32.14 LUPUS NEPHRITIS: ICD-10-CM

## 2021-08-19 LAB
ALBUMIN SERPL BCP-MCNC: 3.4 G/DL (ref 3.5–5.2)
ANION GAP SERPL CALC-SCNC: 9 MMOL/L (ref 8–16)
BASOPHILS # BLD AUTO: 0.01 K/UL (ref 0–0.2)
BASOPHILS NFR BLD: 0.2 % (ref 0–1.9)
BUN SERPL-MCNC: 12 MG/DL (ref 6–20)
CALCIUM SERPL-MCNC: 9 MG/DL (ref 8.7–10.5)
CHLORIDE SERPL-SCNC: 104 MMOL/L (ref 95–110)
CK SERPL-CCNC: 46 U/L (ref 20–180)
CO2 SERPL-SCNC: 25 MMOL/L (ref 23–29)
CREAT SERPL-MCNC: 0.8 MG/DL (ref 0.5–1.4)
DIFFERENTIAL METHOD: ABNORMAL
EOSINOPHIL # BLD AUTO: 0 K/UL (ref 0–0.5)
EOSINOPHIL NFR BLD: 0.5 % (ref 0–8)
ERYTHROCYTE [DISTWIDTH] IN BLOOD BY AUTOMATED COUNT: 14.9 % (ref 11.5–14.5)
EST. GFR  (AFRICAN AMERICAN): >60 ML/MIN/1.73 M^2
EST. GFR  (NON AFRICAN AMERICAN): >60 ML/MIN/1.73 M^2
GLUCOSE SERPL-MCNC: 80 MG/DL (ref 70–110)
HCT VFR BLD AUTO: 29.7 % (ref 37–48.5)
HGB BLD-MCNC: 8.7 G/DL (ref 12–16)
IMM GRANULOCYTES # BLD AUTO: 0 K/UL (ref 0–0.04)
IMM GRANULOCYTES NFR BLD AUTO: 0 % (ref 0–0.5)
LYMPHOCYTES # BLD AUTO: 1.7 K/UL (ref 1–4.8)
LYMPHOCYTES NFR BLD: 40.3 % (ref 18–48)
MCH RBC QN AUTO: 25.7 PG (ref 27–31)
MCHC RBC AUTO-ENTMCNC: 29.3 G/DL (ref 32–36)
MCV RBC AUTO: 88 FL (ref 82–98)
MONOCYTES # BLD AUTO: 0.5 K/UL (ref 0.3–1)
MONOCYTES NFR BLD: 11.8 % (ref 4–15)
NEUTROPHILS # BLD AUTO: 2 K/UL (ref 1.8–7.7)
NEUTROPHILS NFR BLD: 47.2 % (ref 38–73)
NRBC BLD-RTO: 0 /100 WBC
PHOSPHATE SERPL-MCNC: 3.6 MG/DL (ref 2.7–4.5)
PLATELET # BLD AUTO: 191 K/UL (ref 150–450)
PMV BLD AUTO: 10.7 FL (ref 9.2–12.9)
POTASSIUM SERPL-SCNC: 4.4 MMOL/L (ref 3.5–5.1)
RBC # BLD AUTO: 3.39 M/UL (ref 4–5.4)
SODIUM SERPL-SCNC: 138 MMOL/L (ref 136–145)
WBC # BLD AUTO: 4.32 K/UL (ref 3.9–12.7)

## 2021-08-19 PROCEDURE — 86235 NUCLEAR ANTIGEN ANTIBODY: CPT | Mod: 59 | Performed by: INTERNAL MEDICINE

## 2021-08-19 PROCEDURE — 82550 ASSAY OF CK (CPK): CPT | Performed by: INTERNAL MEDICINE

## 2021-08-19 PROCEDURE — 85025 COMPLETE CBC W/AUTO DIFF WBC: CPT | Performed by: INTERNAL MEDICINE

## 2021-08-19 PROCEDURE — 80069 RENAL FUNCTION PANEL: CPT | Performed by: INTERNAL MEDICINE

## 2021-08-19 PROCEDURE — 86038 ANTINUCLEAR ANTIBODIES: CPT | Performed by: INTERNAL MEDICINE

## 2021-08-19 PROCEDURE — 86039 ANTINUCLEAR ANTIBODIES (ANA): CPT | Performed by: INTERNAL MEDICINE

## 2021-08-19 PROCEDURE — 36415 COLL VENOUS BLD VENIPUNCTURE: CPT | Performed by: INTERNAL MEDICINE

## 2021-08-19 PROCEDURE — 86225 DNA ANTIBODY NATIVE: CPT | Mod: 59 | Performed by: INTERNAL MEDICINE

## 2021-08-20 LAB
ANA PATTERN 1: NORMAL
ANA SER QL IF: POSITIVE
ANA TITR SER IF: NORMAL {TITER}

## 2021-08-24 LAB
ANTI SM ANTIBODY: 2.03 RATIO (ref 0–0.99)
ANTI SM/RNP ANTIBODY: 5.99 RATIO (ref 0–0.99)
ANTI-SM INTERPRETATION: POSITIVE
ANTI-SM/RNP INTERPRETATION: POSITIVE
ANTI-SSA ANTIBODY: 2.56 RATIO (ref 0–0.99)
ANTI-SSA INTERPRETATION: POSITIVE
ANTI-SSB ANTIBODY: 0.07 RATIO (ref 0–0.99)
ANTI-SSB INTERPRETATION: NEGATIVE
DNA TITER: NORMAL
DSDNA AB SER-ACNC: POSITIVE [IU]/ML

## 2021-08-26 ENCOUNTER — LAB VISIT (OUTPATIENT)
Dept: LAB | Facility: HOSPITAL | Age: 46
End: 2021-08-26
Attending: INTERNAL MEDICINE
Payer: MEDICARE

## 2021-08-26 ENCOUNTER — OFFICE VISIT (OUTPATIENT)
Dept: NEPHROLOGY | Facility: CLINIC | Age: 46
End: 2021-08-26
Payer: MEDICARE

## 2021-08-26 VITALS
HEIGHT: 63 IN | SYSTOLIC BLOOD PRESSURE: 120 MMHG | OXYGEN SATURATION: 96 % | WEIGHT: 112.88 LBS | HEART RATE: 95 BPM | BODY MASS INDEX: 20 KG/M2 | DIASTOLIC BLOOD PRESSURE: 80 MMHG

## 2021-08-26 DIAGNOSIS — M32.9 SYSTEMIC LUPUS ERYTHEMATOSUS, UNSPECIFIED SLE TYPE, UNSPECIFIED ORGAN INVOLVEMENT STATUS: Primary | ICD-10-CM

## 2021-08-26 DIAGNOSIS — M32.9 SYSTEMIC LUPUS ERYTHEMATOSUS, UNSPECIFIED SLE TYPE, UNSPECIFIED ORGAN INVOLVEMENT STATUS: ICD-10-CM

## 2021-08-26 LAB
ANION GAP SERPL CALC-SCNC: 11 MMOL/L (ref 8–16)
BUN SERPL-MCNC: 13 MG/DL (ref 6–20)
CALCIUM SERPL-MCNC: 9.7 MG/DL (ref 8.7–10.5)
CHLORIDE SERPL-SCNC: 103 MMOL/L (ref 95–110)
CK SERPL-CCNC: 47 U/L (ref 20–180)
CO2 SERPL-SCNC: 25 MMOL/L (ref 23–29)
CREAT SERPL-MCNC: 0.8 MG/DL (ref 0.5–1.4)
EST. GFR  (AFRICAN AMERICAN): >60 ML/MIN/1.73 M^2
EST. GFR  (NON AFRICAN AMERICAN): >60 ML/MIN/1.73 M^2
GLUCOSE SERPL-MCNC: 58 MG/DL (ref 70–110)
POTASSIUM SERPL-SCNC: 4.3 MMOL/L (ref 3.5–5.1)
SODIUM SERPL-SCNC: 139 MMOL/L (ref 136–145)

## 2021-08-26 PROCEDURE — 36415 COLL VENOUS BLD VENIPUNCTURE: CPT | Performed by: INTERNAL MEDICINE

## 2021-08-26 PROCEDURE — 80048 BASIC METABOLIC PNL TOTAL CA: CPT | Performed by: INTERNAL MEDICINE

## 2021-08-26 PROCEDURE — 99215 OFFICE O/P EST HI 40 MIN: CPT | Mod: S$PBB,,, | Performed by: INTERNAL MEDICINE

## 2021-08-26 PROCEDURE — 82550 ASSAY OF CK (CPK): CPT | Performed by: INTERNAL MEDICINE

## 2021-08-26 PROCEDURE — 99215 PR OFFICE/OUTPT VISIT, EST, LEVL V, 40-54 MIN: ICD-10-PCS | Mod: S$PBB,,, | Performed by: INTERNAL MEDICINE

## 2021-08-26 PROCEDURE — 99213 OFFICE O/P EST LOW 20 MIN: CPT | Mod: PBBFAC | Performed by: INTERNAL MEDICINE

## 2021-08-26 PROCEDURE — 99999 PR PBB SHADOW E&M-EST. PATIENT-LVL III: ICD-10-PCS | Mod: PBBFAC,,, | Performed by: INTERNAL MEDICINE

## 2021-08-26 PROCEDURE — 99999 PR PBB SHADOW E&M-EST. PATIENT-LVL III: CPT | Mod: PBBFAC,,, | Performed by: INTERNAL MEDICINE

## 2021-08-26 RX ORDER — PREDNISONE 20 MG/1
20 TABLET ORAL DAILY
Qty: 30 TABLET | Refills: 0 | Status: SHIPPED | OUTPATIENT
Start: 2021-08-26 | End: 2021-09-27 | Stop reason: SDUPTHER

## 2021-09-17 ENCOUNTER — HOME CARE VISIT (OUTPATIENT)
Dept: NEUROLOGY | Facility: HOSPITAL | Age: 46
End: 2021-09-17
Payer: MEDICARE

## 2021-09-24 ENCOUNTER — OFFICE VISIT (OUTPATIENT)
Dept: NEPHROLOGY | Facility: CLINIC | Age: 46
End: 2021-09-24
Payer: MEDICARE

## 2021-09-24 ENCOUNTER — LAB VISIT (OUTPATIENT)
Dept: LAB | Facility: HOSPITAL | Age: 46
End: 2021-09-24
Attending: INTERNAL MEDICINE
Payer: MEDICARE

## 2021-09-24 VITALS
BODY MASS INDEX: 20.66 KG/M2 | WEIGHT: 116.63 LBS | HEIGHT: 63 IN | SYSTOLIC BLOOD PRESSURE: 120 MMHG | DIASTOLIC BLOOD PRESSURE: 80 MMHG

## 2021-09-24 DIAGNOSIS — M32.9 SYSTEMIC LUPUS ERYTHEMATOSUS, UNSPECIFIED SLE TYPE, UNSPECIFIED ORGAN INVOLVEMENT STATUS: Primary | ICD-10-CM

## 2021-09-24 DIAGNOSIS — M32.9 SYSTEMIC LUPUS ERYTHEMATOSUS, UNSPECIFIED SLE TYPE, UNSPECIFIED ORGAN INVOLVEMENT STATUS: ICD-10-CM

## 2021-09-24 LAB
ALBUMIN SERPL BCP-MCNC: 3.6 G/DL (ref 3.5–5.2)
ANION GAP SERPL CALC-SCNC: 10 MMOL/L (ref 8–16)
ANISOCYTOSIS BLD QL SMEAR: SLIGHT
BASOPHILS # BLD AUTO: 0.02 K/UL (ref 0–0.2)
BASOPHILS NFR BLD: 0.5 % (ref 0–1.9)
BUN SERPL-MCNC: 11 MG/DL (ref 6–20)
CALCIUM SERPL-MCNC: 9.2 MG/DL (ref 8.7–10.5)
CHLORIDE SERPL-SCNC: 104 MMOL/L (ref 95–110)
CK SERPL-CCNC: 65 U/L (ref 20–180)
CO2 SERPL-SCNC: 23 MMOL/L (ref 23–29)
CREAT SERPL-MCNC: 0.7 MG/DL (ref 0.5–1.4)
DIFFERENTIAL METHOD: ABNORMAL
EOSINOPHIL # BLD AUTO: 0 K/UL (ref 0–0.5)
EOSINOPHIL NFR BLD: 0.5 % (ref 0–8)
ERYTHROCYTE [DISTWIDTH] IN BLOOD BY AUTOMATED COUNT: 14.9 % (ref 11.5–14.5)
EST. GFR  (AFRICAN AMERICAN): >60 ML/MIN/1.73 M^2
EST. GFR  (NON AFRICAN AMERICAN): >60 ML/MIN/1.73 M^2
GLUCOSE SERPL-MCNC: 65 MG/DL (ref 70–110)
HCT VFR BLD AUTO: 29.4 % (ref 37–48.5)
HGB BLD-MCNC: 9.1 G/DL (ref 12–16)
IMM GRANULOCYTES # BLD AUTO: 0.01 K/UL (ref 0–0.04)
IMM GRANULOCYTES NFR BLD AUTO: 0.3 % (ref 0–0.5)
LYMPHOCYTES # BLD AUTO: 1.2 K/UL (ref 1–4.8)
LYMPHOCYTES NFR BLD: 30.1 % (ref 18–48)
MCH RBC QN AUTO: 25.6 PG (ref 27–31)
MCHC RBC AUTO-ENTMCNC: 31 G/DL (ref 32–36)
MCV RBC AUTO: 83 FL (ref 82–98)
MONOCYTES # BLD AUTO: 0.4 K/UL (ref 0.3–1)
MONOCYTES NFR BLD: 9.4 % (ref 4–15)
NEUTROPHILS # BLD AUTO: 2.3 K/UL (ref 1.8–7.7)
NEUTROPHILS NFR BLD: 59.2 % (ref 38–73)
NRBC BLD-RTO: 0 /100 WBC
OVALOCYTES BLD QL SMEAR: ABNORMAL
PHOSPHATE SERPL-MCNC: 4.7 MG/DL (ref 2.7–4.5)
PLATELET # BLD AUTO: 142 K/UL (ref 150–450)
PLATELET BLD QL SMEAR: ABNORMAL
PMV BLD AUTO: 12.7 FL (ref 9.2–12.9)
POIKILOCYTOSIS BLD QL SMEAR: SLIGHT
POLYCHROMASIA BLD QL SMEAR: ABNORMAL
POTASSIUM SERPL-SCNC: 4.3 MMOL/L (ref 3.5–5.1)
RBC # BLD AUTO: 3.55 M/UL (ref 4–5.4)
SODIUM SERPL-SCNC: 137 MMOL/L (ref 136–145)
WBC # BLD AUTO: 3.82 K/UL (ref 3.9–12.7)

## 2021-09-24 PROCEDURE — 99213 OFFICE O/P EST LOW 20 MIN: CPT | Mod: PBBFAC | Performed by: INTERNAL MEDICINE

## 2021-09-24 PROCEDURE — 82550 ASSAY OF CK (CPK): CPT | Performed by: INTERNAL MEDICINE

## 2021-09-24 PROCEDURE — 36415 COLL VENOUS BLD VENIPUNCTURE: CPT | Performed by: INTERNAL MEDICINE

## 2021-09-24 PROCEDURE — 99999 PR PBB SHADOW E&M-EST. PATIENT-LVL III: ICD-10-PCS | Mod: PBBFAC,,, | Performed by: INTERNAL MEDICINE

## 2021-09-24 PROCEDURE — 99214 PR OFFICE/OUTPT VISIT, EST, LEVL IV, 30-39 MIN: ICD-10-PCS | Mod: S$PBB,,, | Performed by: INTERNAL MEDICINE

## 2021-09-24 PROCEDURE — 99999 PR PBB SHADOW E&M-EST. PATIENT-LVL III: CPT | Mod: PBBFAC,,, | Performed by: INTERNAL MEDICINE

## 2021-09-24 PROCEDURE — 99214 OFFICE O/P EST MOD 30 MIN: CPT | Mod: S$PBB,,, | Performed by: INTERNAL MEDICINE

## 2021-09-24 PROCEDURE — 85025 COMPLETE CBC W/AUTO DIFF WBC: CPT | Performed by: INTERNAL MEDICINE

## 2021-09-24 PROCEDURE — 86225 DNA ANTIBODY NATIVE: CPT | Performed by: INTERNAL MEDICINE

## 2021-09-24 PROCEDURE — 86225 DNA ANTIBODY NATIVE: CPT | Mod: 59 | Performed by: INTERNAL MEDICINE

## 2021-09-24 PROCEDURE — 80069 RENAL FUNCTION PANEL: CPT | Performed by: INTERNAL MEDICINE

## 2021-09-27 ENCOUNTER — TELEPHONE (OUTPATIENT)
Dept: NEPHROLOGY | Facility: CLINIC | Age: 46
End: 2021-09-27

## 2021-09-27 DIAGNOSIS — M32.9 SYSTEMIC LUPUS ERYTHEMATOSUS, UNSPECIFIED SLE TYPE, UNSPECIFIED ORGAN INVOLVEMENT STATUS: ICD-10-CM

## 2021-09-27 DIAGNOSIS — M32.14 LUPUS NEPHRITIS: Primary | ICD-10-CM

## 2021-09-27 RX ORDER — CHOLECALCIFEROL (VITAMIN D3) 25 MCG
1000 TABLET ORAL DAILY
COMMUNITY
End: 2022-04-20 | Stop reason: SDUPTHER

## 2021-09-27 RX ORDER — PREDNISONE 20 MG/1
20 TABLET ORAL DAILY
Qty: 30 TABLET | Refills: 0 | Status: SHIPPED | OUTPATIENT
Start: 2021-09-27 | End: 2021-11-07 | Stop reason: SDUPTHER

## 2021-09-30 LAB
DNA TITER: NORMAL
DSDNA AB SER-ACNC: POSITIVE [IU]/ML

## 2021-10-18 ENCOUNTER — HOME CARE VISIT (OUTPATIENT)
Dept: NEUROLOGY | Facility: HOSPITAL | Age: 46
End: 2021-10-18

## 2021-10-18 VITALS
DIASTOLIC BLOOD PRESSURE: 70 MMHG | WEIGHT: 117.19 LBS | HEART RATE: 88 BPM | SYSTOLIC BLOOD PRESSURE: 124 MMHG | BODY MASS INDEX: 20.76 KG/M2

## 2021-10-28 ENCOUNTER — LAB VISIT (OUTPATIENT)
Dept: LAB | Facility: OTHER | Age: 46
End: 2021-10-28
Attending: INTERNAL MEDICINE
Payer: MEDICARE

## 2021-10-28 DIAGNOSIS — M32.8 OTHER FORMS OF SYSTEMIC LUPUS ERYTHEMATOSUS, UNSPECIFIED ORGAN INVOLVEMENT STATUS: ICD-10-CM

## 2021-10-28 DIAGNOSIS — M32.14 LUPUS NEPHRITIS: ICD-10-CM

## 2021-10-28 LAB
ALBUMIN SERPL BCP-MCNC: 3.6 G/DL (ref 3.5–5.2)
ANION GAP SERPL CALC-SCNC: 11 MMOL/L (ref 8–16)
BASOPHILS # BLD AUTO: 0.02 K/UL (ref 0–0.2)
BASOPHILS NFR BLD: 0.4 % (ref 0–1.9)
BUN SERPL-MCNC: 14 MG/DL (ref 6–20)
C3 SERPL-MCNC: 79 MG/DL (ref 50–180)
C4 SERPL-MCNC: 21 MG/DL (ref 11–44)
CALCIUM SERPL-MCNC: 9.4 MG/DL (ref 8.7–10.5)
CHLORIDE SERPL-SCNC: 105 MMOL/L (ref 95–110)
CK SERPL-CCNC: 61 U/L (ref 20–180)
CO2 SERPL-SCNC: 25 MMOL/L (ref 23–29)
CREAT SERPL-MCNC: 0.8 MG/DL (ref 0.5–1.4)
CRP SERPL-MCNC: 3.1 MG/L (ref 0–8.2)
DIFFERENTIAL METHOD: ABNORMAL
EOSINOPHIL # BLD AUTO: 0 K/UL (ref 0–0.5)
EOSINOPHIL NFR BLD: 0.2 % (ref 0–8)
ERYTHROCYTE [DISTWIDTH] IN BLOOD BY AUTOMATED COUNT: 14.4 % (ref 11.5–14.5)
ERYTHROCYTE [SEDIMENTATION RATE] IN BLOOD: 50 MM/HR (ref 0–20)
EST. GFR  (AFRICAN AMERICAN): >60 ML/MIN/1.73 M^2
EST. GFR  (NON AFRICAN AMERICAN): >60 ML/MIN/1.73 M^2
GLUCOSE SERPL-MCNC: 77 MG/DL (ref 70–110)
HBV SURFACE AG SERPL QL IA: NEGATIVE
HCT VFR BLD AUTO: 33.3 % (ref 37–48.5)
HGB BLD-MCNC: 10.1 G/DL (ref 12–16)
IMM GRANULOCYTES # BLD AUTO: 0.01 K/UL (ref 0–0.04)
IMM GRANULOCYTES NFR BLD AUTO: 0.2 % (ref 0–0.5)
LYMPHOCYTES # BLD AUTO: 1.5 K/UL (ref 1–4.8)
LYMPHOCYTES NFR BLD: 27.6 % (ref 18–48)
MCH RBC QN AUTO: 25.6 PG (ref 27–31)
MCHC RBC AUTO-ENTMCNC: 30.3 G/DL (ref 32–36)
MCV RBC AUTO: 84 FL (ref 82–98)
MONOCYTES # BLD AUTO: 0.4 K/UL (ref 0.3–1)
MONOCYTES NFR BLD: 7.9 % (ref 4–15)
NEUTROPHILS # BLD AUTO: 3.6 K/UL (ref 1.8–7.7)
NEUTROPHILS NFR BLD: 63.7 % (ref 38–73)
NRBC BLD-RTO: 0 /100 WBC
PHOSPHATE SERPL-MCNC: 3.6 MG/DL (ref 2.7–4.5)
PLATELET # BLD AUTO: 213 K/UL (ref 150–450)
PMV BLD AUTO: 11 FL (ref 9.2–12.9)
POTASSIUM SERPL-SCNC: 3.9 MMOL/L (ref 3.5–5.1)
RBC # BLD AUTO: 3.95 M/UL (ref 4–5.4)
SODIUM SERPL-SCNC: 141 MMOL/L (ref 136–145)
WBC # BLD AUTO: 5.57 K/UL (ref 3.9–12.7)

## 2021-10-28 PROCEDURE — 87340 HEPATITIS B SURFACE AG IA: CPT | Performed by: INTERNAL MEDICINE

## 2021-10-28 PROCEDURE — 86140 C-REACTIVE PROTEIN: CPT | Performed by: INTERNAL MEDICINE

## 2021-10-28 PROCEDURE — 82550 ASSAY OF CK (CPK): CPT | Performed by: INTERNAL MEDICINE

## 2021-10-28 PROCEDURE — 85025 COMPLETE CBC W/AUTO DIFF WBC: CPT | Performed by: INTERNAL MEDICINE

## 2021-10-28 PROCEDURE — 86225 DNA ANTIBODY NATIVE: CPT | Performed by: INTERNAL MEDICINE

## 2021-10-28 PROCEDURE — 86160 COMPLEMENT ANTIGEN: CPT | Mod: 59 | Performed by: INTERNAL MEDICINE

## 2021-10-28 PROCEDURE — 85651 RBC SED RATE NONAUTOMATED: CPT | Performed by: INTERNAL MEDICINE

## 2021-10-28 PROCEDURE — 86160 COMPLEMENT ANTIGEN: CPT | Performed by: INTERNAL MEDICINE

## 2021-10-28 PROCEDURE — 86225 DNA ANTIBODY NATIVE: CPT | Mod: 59 | Performed by: INTERNAL MEDICINE

## 2021-10-28 PROCEDURE — 80069 RENAL FUNCTION PANEL: CPT | Performed by: INTERNAL MEDICINE

## 2021-11-01 ENCOUNTER — OFFICE VISIT (OUTPATIENT)
Dept: NEPHROLOGY | Facility: CLINIC | Age: 46
End: 2021-11-01
Payer: MEDICARE

## 2021-11-01 VITALS
DIASTOLIC BLOOD PRESSURE: 82 MMHG | SYSTOLIC BLOOD PRESSURE: 120 MMHG | OXYGEN SATURATION: 69 % | WEIGHT: 123.44 LBS | BODY MASS INDEX: 21.87 KG/M2 | HEART RATE: 58 BPM | HEIGHT: 63 IN

## 2021-11-01 DIAGNOSIS — M32.14 LUPUS NEPHRITIS: Primary | ICD-10-CM

## 2021-11-01 DIAGNOSIS — M32.9 SYSTEMIC LUPUS ERYTHEMATOSUS, UNSPECIFIED SLE TYPE, UNSPECIFIED ORGAN INVOLVEMENT STATUS: ICD-10-CM

## 2021-11-01 PROCEDURE — 99215 OFFICE O/P EST HI 40 MIN: CPT | Mod: S$PBB,,, | Performed by: INTERNAL MEDICINE

## 2021-11-01 PROCEDURE — 99215 PR OFFICE/OUTPT VISIT, EST, LEVL V, 40-54 MIN: ICD-10-PCS | Mod: S$PBB,,, | Performed by: INTERNAL MEDICINE

## 2021-11-01 PROCEDURE — 99213 OFFICE O/P EST LOW 20 MIN: CPT | Mod: PBBFAC | Performed by: INTERNAL MEDICINE

## 2021-11-01 PROCEDURE — 99999 PR PBB SHADOW E&M-EST. PATIENT-LVL III: ICD-10-PCS | Mod: PBBFAC,,, | Performed by: INTERNAL MEDICINE

## 2021-11-01 PROCEDURE — 99999 PR PBB SHADOW E&M-EST. PATIENT-LVL III: CPT | Mod: PBBFAC,,, | Performed by: INTERNAL MEDICINE

## 2021-11-02 ENCOUNTER — OFFICE VISIT (OUTPATIENT)
Dept: RHEUMATOLOGY | Facility: CLINIC | Age: 46
End: 2021-11-02
Payer: MEDICARE

## 2021-11-02 VITALS
SYSTOLIC BLOOD PRESSURE: 132 MMHG | DIASTOLIC BLOOD PRESSURE: 84 MMHG | WEIGHT: 124.31 LBS | BODY MASS INDEX: 22.03 KG/M2 | HEIGHT: 63 IN | HEART RATE: 79 BPM

## 2021-11-02 DIAGNOSIS — Z79.899 LONG-TERM USE OF HYDROXYCHLOROQUINE: ICD-10-CM

## 2021-11-02 DIAGNOSIS — Z86.73 HISTORY OF MULTIPLE STROKES: ICD-10-CM

## 2021-11-02 DIAGNOSIS — Z79.52 LONG TERM (CURRENT) USE OF SYSTEMIC STEROIDS: ICD-10-CM

## 2021-11-02 DIAGNOSIS — N18.32 STAGE 3B CHRONIC KIDNEY DISEASE: ICD-10-CM

## 2021-11-02 DIAGNOSIS — M32.8 OTHER FORMS OF SYSTEMIC LUPUS ERYTHEMATOSUS, UNSPECIFIED ORGAN INVOLVEMENT STATUS: Primary | ICD-10-CM

## 2021-11-02 DIAGNOSIS — M32.9 SYSTEMIC LUPUS ERYTHEMATOSUS, UNSPECIFIED SLE TYPE, UNSPECIFIED ORGAN INVOLVEMENT STATUS: ICD-10-CM

## 2021-11-02 DIAGNOSIS — R80.9 PROTEINURIA, UNSPECIFIED TYPE: ICD-10-CM

## 2021-11-02 DIAGNOSIS — Z79.60 LONG-TERM USE OF IMMUNOSUPPRESSANT MEDICATION: ICD-10-CM

## 2021-11-02 PROCEDURE — 99214 PR OFFICE/OUTPT VISIT, EST, LEVL IV, 30-39 MIN: ICD-10-PCS | Mod: S$PBB,,, | Performed by: INTERNAL MEDICINE

## 2021-11-02 PROCEDURE — 99999 PR PBB SHADOW E&M-EST. PATIENT-LVL III: CPT | Mod: PBBFAC,,, | Performed by: INTERNAL MEDICINE

## 2021-11-02 PROCEDURE — 99213 OFFICE O/P EST LOW 20 MIN: CPT | Mod: PBBFAC | Performed by: INTERNAL MEDICINE

## 2021-11-02 PROCEDURE — 99214 OFFICE O/P EST MOD 30 MIN: CPT | Mod: S$PBB,,, | Performed by: INTERNAL MEDICINE

## 2021-11-02 PROCEDURE — 99999 PR PBB SHADOW E&M-EST. PATIENT-LVL III: ICD-10-PCS | Mod: PBBFAC,,, | Performed by: INTERNAL MEDICINE

## 2021-11-02 RX ORDER — HYDROXYCHLOROQUINE SULFATE 200 MG/1
400 TABLET, FILM COATED ORAL
Qty: 180 TABLET | Refills: 1 | Status: SHIPPED | OUTPATIENT
Start: 2021-11-02 | End: 2022-05-01

## 2021-11-02 ASSESSMENT — ROUTINE ASSESSMENT OF PATIENT INDEX DATA (RAPID3)
MDHAQ FUNCTION SCORE: 0
TOTAL RAPID3 SCORE: 0.33
PSYCHOLOGICAL DISTRESS SCORE: 0
FATIGUE SCORE: 0.5
PATIENT GLOBAL ASSESSMENT SCORE: 0.5
PAIN SCORE: 0.5
AM STIFFNESS SCORE: 1, YES

## 2021-11-02 ASSESSMENT — SYSTEMIC LUPUS ERYTHEMATOSUS DISEASE ACTIVITY INDEX (SLEDAI): TOTAL_SCORE: 2

## 2021-11-03 LAB
DNA TITER: 2560
DSDNA AB SER-ACNC: POSITIVE [IU]/ML

## 2021-11-05 ENCOUNTER — TELEPHONE (OUTPATIENT)
Dept: NEPHROLOGY | Facility: CLINIC | Age: 46
End: 2021-11-05
Payer: MEDICAID

## 2021-11-07 RX ORDER — PREDNISONE 20 MG/1
20 TABLET ORAL DAILY
Qty: 30 TABLET | Refills: 0 | Status: SHIPPED | OUTPATIENT
Start: 2021-11-07 | End: 2021-12-03 | Stop reason: SDUPTHER

## 2021-11-07 RX ORDER — PANTOPRAZOLE SODIUM 40 MG/1
40 TABLET, DELAYED RELEASE ORAL DAILY
Qty: 90 TABLET | Refills: 0 | Status: SHIPPED | OUTPATIENT
Start: 2021-11-07 | End: 2022-04-20 | Stop reason: SDUPTHER

## 2021-11-07 RX ORDER — SULFAMETHOXAZOLE AND TRIMETHOPRIM 400; 80 MG/1; MG/1
1 TABLET ORAL
Qty: 45 TABLET | Refills: 0 | Status: SHIPPED | OUTPATIENT
Start: 2021-11-08 | End: 2022-02-14 | Stop reason: ALTCHOICE

## 2021-11-17 ENCOUNTER — HOME CARE VISIT (OUTPATIENT)
Dept: NEUROLOGY | Facility: HOSPITAL | Age: 46
End: 2021-11-17
Payer: MEDICARE

## 2021-11-18 VITALS
HEART RATE: 77 BPM | WEIGHT: 117.19 LBS | BODY MASS INDEX: 20.76 KG/M2 | DIASTOLIC BLOOD PRESSURE: 74 MMHG | SYSTOLIC BLOOD PRESSURE: 130 MMHG

## 2021-11-29 ENCOUNTER — LAB VISIT (OUTPATIENT)
Dept: LAB | Facility: HOSPITAL | Age: 46
End: 2021-11-29
Attending: INTERNAL MEDICINE
Payer: MEDICARE

## 2021-11-29 DIAGNOSIS — M32.8 OTHER FORMS OF SYSTEMIC LUPUS ERYTHEMATOSUS, UNSPECIFIED ORGAN INVOLVEMENT STATUS: ICD-10-CM

## 2021-11-29 DIAGNOSIS — M32.14 LUPUS NEPHRITIS: ICD-10-CM

## 2021-11-29 LAB
ALBUMIN SERPL BCP-MCNC: 3.5 G/DL (ref 3.5–5.2)
ANION GAP SERPL CALC-SCNC: 10 MMOL/L (ref 8–16)
BASOPHILS # BLD AUTO: 0.02 K/UL (ref 0–0.2)
BASOPHILS NFR BLD: 0.4 % (ref 0–1.9)
BUN SERPL-MCNC: 20 MG/DL (ref 6–20)
CALCIUM SERPL-MCNC: 9.2 MG/DL (ref 8.7–10.5)
CHLORIDE SERPL-SCNC: 103 MMOL/L (ref 95–110)
CO2 SERPL-SCNC: 23 MMOL/L (ref 23–29)
CREAT SERPL-MCNC: 1 MG/DL (ref 0.5–1.4)
CRP SERPL-MCNC: 5.6 MG/L (ref 0–8.2)
DIFFERENTIAL METHOD: ABNORMAL
EOSINOPHIL # BLD AUTO: 0 K/UL (ref 0–0.5)
EOSINOPHIL NFR BLD: 0.4 % (ref 0–8)
ERYTHROCYTE [DISTWIDTH] IN BLOOD BY AUTOMATED COUNT: 14.1 % (ref 11.5–14.5)
ERYTHROCYTE [SEDIMENTATION RATE] IN BLOOD BY WESTERGREN METHOD: 76 MM/HR (ref 0–36)
EST. GFR  (AFRICAN AMERICAN): >60 ML/MIN/1.73 M^2
EST. GFR  (NON AFRICAN AMERICAN): >60 ML/MIN/1.73 M^2
GLUCOSE SERPL-MCNC: 77 MG/DL (ref 70–110)
HCT VFR BLD AUTO: 33.9 % (ref 37–48.5)
HGB BLD-MCNC: 10.5 G/DL (ref 12–16)
IMM GRANULOCYTES # BLD AUTO: 0.02 K/UL (ref 0–0.04)
IMM GRANULOCYTES NFR BLD AUTO: 0.4 % (ref 0–0.5)
LYMPHOCYTES # BLD AUTO: 1.2 K/UL (ref 1–4.8)
LYMPHOCYTES NFR BLD: 25.3 % (ref 18–48)
MCH RBC QN AUTO: 25.2 PG (ref 27–31)
MCHC RBC AUTO-ENTMCNC: 31 G/DL (ref 32–36)
MCV RBC AUTO: 82 FL (ref 82–98)
MONOCYTES # BLD AUTO: 0.4 K/UL (ref 0.3–1)
MONOCYTES NFR BLD: 8.6 % (ref 4–15)
NEUTROPHILS # BLD AUTO: 3 K/UL (ref 1.8–7.7)
NEUTROPHILS NFR BLD: 64.9 % (ref 38–73)
NRBC BLD-RTO: 0 /100 WBC
PHOSPHATE SERPL-MCNC: 3.6 MG/DL (ref 2.7–4.5)
PLATELET # BLD AUTO: 175 K/UL (ref 150–450)
PMV BLD AUTO: 10.9 FL (ref 9.2–12.9)
POTASSIUM SERPL-SCNC: 3.9 MMOL/L (ref 3.5–5.1)
RBC # BLD AUTO: 4.16 M/UL (ref 4–5.4)
SODIUM SERPL-SCNC: 136 MMOL/L (ref 136–145)
WBC # BLD AUTO: 4.66 K/UL (ref 3.9–12.7)

## 2021-11-29 PROCEDURE — 85652 RBC SED RATE AUTOMATED: CPT | Performed by: INTERNAL MEDICINE

## 2021-11-29 PROCEDURE — 86225 DNA ANTIBODY NATIVE: CPT | Performed by: INTERNAL MEDICINE

## 2021-11-29 PROCEDURE — 80069 RENAL FUNCTION PANEL: CPT | Performed by: INTERNAL MEDICINE

## 2021-11-29 PROCEDURE — 85025 COMPLETE CBC W/AUTO DIFF WBC: CPT | Performed by: INTERNAL MEDICINE

## 2021-11-29 PROCEDURE — 86225 DNA ANTIBODY NATIVE: CPT | Mod: 59 | Performed by: INTERNAL MEDICINE

## 2021-11-29 PROCEDURE — 36415 COLL VENOUS BLD VENIPUNCTURE: CPT | Performed by: INTERNAL MEDICINE

## 2021-11-29 PROCEDURE — 86140 C-REACTIVE PROTEIN: CPT | Performed by: INTERNAL MEDICINE

## 2021-12-01 LAB
DNA TITER: NORMAL
DSDNA AB SER-ACNC: POSITIVE [IU]/ML

## 2021-12-03 ENCOUNTER — OFFICE VISIT (OUTPATIENT)
Dept: NEPHROLOGY | Facility: CLINIC | Age: 46
End: 2021-12-03
Payer: MEDICARE

## 2021-12-03 VITALS
WEIGHT: 122.38 LBS | HEIGHT: 63 IN | BODY MASS INDEX: 21.68 KG/M2 | DIASTOLIC BLOOD PRESSURE: 80 MMHG | SYSTOLIC BLOOD PRESSURE: 120 MMHG

## 2021-12-03 DIAGNOSIS — M32.9 SYSTEMIC LUPUS ERYTHEMATOSUS, UNSPECIFIED SLE TYPE, UNSPECIFIED ORGAN INVOLVEMENT STATUS: ICD-10-CM

## 2021-12-03 PROCEDURE — 99999 PR PBB SHADOW E&M-EST. PATIENT-LVL III: ICD-10-PCS | Mod: PBBFAC,,, | Performed by: INTERNAL MEDICINE

## 2021-12-03 PROCEDURE — 99215 OFFICE O/P EST HI 40 MIN: CPT | Mod: S$PBB,,, | Performed by: INTERNAL MEDICINE

## 2021-12-03 PROCEDURE — 99999 PR PBB SHADOW E&M-EST. PATIENT-LVL III: CPT | Mod: PBBFAC,,, | Performed by: INTERNAL MEDICINE

## 2021-12-03 PROCEDURE — 99215 PR OFFICE/OUTPT VISIT, EST, LEVL V, 40-54 MIN: ICD-10-PCS | Mod: S$PBB,,, | Performed by: INTERNAL MEDICINE

## 2021-12-03 PROCEDURE — 99213 OFFICE O/P EST LOW 20 MIN: CPT | Mod: PBBFAC | Performed by: INTERNAL MEDICINE

## 2021-12-03 RX ORDER — PREDNISONE 10 MG/1
10 TABLET ORAL DAILY
Qty: 30 TABLET | Refills: 0 | Status: SHIPPED | OUTPATIENT
Start: 2021-12-03 | End: 2022-02-14 | Stop reason: SDUPTHER

## 2021-12-17 ENCOUNTER — HOME CARE VISIT (OUTPATIENT)
Dept: NEUROLOGY | Facility: HOSPITAL | Age: 46
End: 2021-12-17
Payer: MEDICARE

## 2022-01-07 ENCOUNTER — LAB VISIT (OUTPATIENT)
Dept: LAB | Facility: HOSPITAL | Age: 47
End: 2022-01-07
Attending: INTERNAL MEDICINE
Payer: MEDICARE

## 2022-01-07 ENCOUNTER — OFFICE VISIT (OUTPATIENT)
Dept: NEPHROLOGY | Facility: CLINIC | Age: 47
End: 2022-01-07
Payer: MEDICARE

## 2022-01-07 VITALS
SYSTOLIC BLOOD PRESSURE: 120 MMHG | HEART RATE: 72 BPM | BODY MASS INDEX: 21.21 KG/M2 | OXYGEN SATURATION: 96 % | HEIGHT: 63 IN | DIASTOLIC BLOOD PRESSURE: 80 MMHG | WEIGHT: 119.69 LBS

## 2022-01-07 DIAGNOSIS — M32.9 SYSTEMIC LUPUS ERYTHEMATOSUS, UNSPECIFIED SLE TYPE, UNSPECIFIED ORGAN INVOLVEMENT STATUS: ICD-10-CM

## 2022-01-07 DIAGNOSIS — M32.9 SYSTEMIC LUPUS ERYTHEMATOSUS, UNSPECIFIED SLE TYPE, UNSPECIFIED ORGAN INVOLVEMENT STATUS: Primary | ICD-10-CM

## 2022-01-07 LAB
ALBUMIN SERPL BCP-MCNC: 3.8 G/DL (ref 3.5–5.2)
ANION GAP SERPL CALC-SCNC: 6 MMOL/L (ref 8–16)
BASOPHILS # BLD AUTO: 0.03 K/UL (ref 0–0.2)
BASOPHILS NFR BLD: 0.6 % (ref 0–1.9)
BUN SERPL-MCNC: 25 MG/DL (ref 6–20)
C3 SERPL-MCNC: 99 MG/DL (ref 50–180)
C4 SERPL-MCNC: 27 MG/DL (ref 11–44)
CALCIUM SERPL-MCNC: 9.2 MG/DL (ref 8.7–10.5)
CHLORIDE SERPL-SCNC: 108 MMOL/L (ref 95–110)
CO2 SERPL-SCNC: 26 MMOL/L (ref 23–29)
CREAT SERPL-MCNC: 1.1 MG/DL (ref 0.5–1.4)
DIFFERENTIAL METHOD: ABNORMAL
EOSINOPHIL # BLD AUTO: 0 K/UL (ref 0–0.5)
EOSINOPHIL NFR BLD: 0.2 % (ref 0–8)
ERYTHROCYTE [DISTWIDTH] IN BLOOD BY AUTOMATED COUNT: 14.6 % (ref 11.5–14.5)
EST. GFR  (AFRICAN AMERICAN): >60 ML/MIN/1.73 M^2
EST. GFR  (NON AFRICAN AMERICAN): >60 ML/MIN/1.73 M^2
GLUCOSE SERPL-MCNC: 80 MG/DL (ref 70–110)
HCT VFR BLD AUTO: 35.5 % (ref 37–48.5)
HGB BLD-MCNC: 10.5 G/DL (ref 12–16)
IMM GRANULOCYTES # BLD AUTO: 0.01 K/UL (ref 0–0.04)
IMM GRANULOCYTES NFR BLD AUTO: 0.2 % (ref 0–0.5)
LYMPHOCYTES # BLD AUTO: 1.6 K/UL (ref 1–4.8)
LYMPHOCYTES NFR BLD: 31.4 % (ref 18–48)
MCH RBC QN AUTO: 25.1 PG (ref 27–31)
MCHC RBC AUTO-ENTMCNC: 29.6 G/DL (ref 32–36)
MCV RBC AUTO: 85 FL (ref 82–98)
MONOCYTES # BLD AUTO: 0.5 K/UL (ref 0.3–1)
MONOCYTES NFR BLD: 10.2 % (ref 4–15)
NEUTROPHILS # BLD AUTO: 2.9 K/UL (ref 1.8–7.7)
NEUTROPHILS NFR BLD: 57.4 % (ref 38–73)
NRBC BLD-RTO: 0 /100 WBC
PHOSPHATE SERPL-MCNC: 4.1 MG/DL (ref 2.7–4.5)
PLATELET # BLD AUTO: 246 K/UL (ref 150–450)
PMV BLD AUTO: 10.5 FL (ref 9.2–12.9)
POTASSIUM SERPL-SCNC: 5 MMOL/L (ref 3.5–5.1)
RBC # BLD AUTO: 4.19 M/UL (ref 4–5.4)
SODIUM SERPL-SCNC: 140 MMOL/L (ref 136–145)
WBC # BLD AUTO: 5.1 K/UL (ref 3.9–12.7)

## 2022-01-07 PROCEDURE — 85025 COMPLETE CBC W/AUTO DIFF WBC: CPT | Performed by: INTERNAL MEDICINE

## 2022-01-07 PROCEDURE — 86225 DNA ANTIBODY NATIVE: CPT | Performed by: INTERNAL MEDICINE

## 2022-01-07 PROCEDURE — 36415 COLL VENOUS BLD VENIPUNCTURE: CPT | Performed by: INTERNAL MEDICINE

## 2022-01-07 PROCEDURE — 99215 OFFICE O/P EST HI 40 MIN: CPT | Mod: S$PBB,,, | Performed by: INTERNAL MEDICINE

## 2022-01-07 PROCEDURE — 99215 PR OFFICE/OUTPT VISIT, EST, LEVL V, 40-54 MIN: ICD-10-PCS | Mod: S$PBB,,, | Performed by: INTERNAL MEDICINE

## 2022-01-07 PROCEDURE — 80069 RENAL FUNCTION PANEL: CPT | Performed by: INTERNAL MEDICINE

## 2022-01-07 PROCEDURE — 86160 COMPLEMENT ANTIGEN: CPT | Mod: 59 | Performed by: INTERNAL MEDICINE

## 2022-01-07 PROCEDURE — 86160 COMPLEMENT ANTIGEN: CPT | Performed by: INTERNAL MEDICINE

## 2022-01-07 PROCEDURE — 86225 DNA ANTIBODY NATIVE: CPT | Mod: 59 | Performed by: INTERNAL MEDICINE

## 2022-01-07 PROCEDURE — 99213 OFFICE O/P EST LOW 20 MIN: CPT | Mod: PBBFAC | Performed by: INTERNAL MEDICINE

## 2022-01-07 PROCEDURE — 99999 PR PBB SHADOW E&M-EST. PATIENT-LVL III: CPT | Mod: PBBFAC,,, | Performed by: INTERNAL MEDICINE

## 2022-01-07 PROCEDURE — 99999 PR PBB SHADOW E&M-EST. PATIENT-LVL III: ICD-10-PCS | Mod: PBBFAC,,, | Performed by: INTERNAL MEDICINE

## 2022-01-08 NOTE — PROGRESS NOTES
REASON FOR CONSULT/CHIEF COMPLAIN: Acute renal failure, h/o Lupus nephritis    REFERRING PHYSICIAN: [facility] -Primary Care    HISTORY OF PRESENT ILLNESS: 46 y.o. female  has a past medical history of Disorder of kidney and ureter, Hypertension, SLE (systemic lupus erythematosus), Stroke, and Vertigo. patient was referred here for abnormal renal function. Denied chronic NSAID use, no known exposure to lithium, lead.   Early 2000's she was first time diagnosed with Lupus after a kidney biopsy at [facility], the results are not available now. She also had mild/small strokes at that time. She was give some infusions at that time like every two weeks (she thinks its cyclophosphamide). Since then she reportedly has been on Cellcept, Plaquenil and prednisone. She reports that her medications have not been changed. On reviewing the limited records it is unclear what dose of Cellcept she was taking, she has two cellcept pill bottles at home, one said 500 MG one tablet TID, other said 500 mg three tablets BID. She had BLAYNE with proteinuria in July 2021, wanted to get a kidney biopsy but the patient was hesitant. Gave her high dose prednisone starting July 2021 which improved the BLAYNE and proteinuria. Currently on taper.   Denied any new issues with urination including dysuria, hematuria, urgency, hesitancy, nocturia, incomplete voiding. Denied chest pain, nausea, vomiting, abd pain, nausea, vomiting, diarrhea, shortness of breath, pedal edema, Orthopnea, PND.  Home Blood pressures are not checked     ROS:  General: negative for chills, or fatigue  Respiratory: No cough, shortness of breath, or wheezing  Cardiovascular: No chest pain or dyspnea   Gastrointestinal: No abdominal pain, change in bowel habits  Genito-Urinary: No dysuria, trouble voiding, or hematuria  Neurological: No new focal weakness, no numbness  Dermatological: No rash or ulcers.    PAST MEDICAL HISTORY:  Past Medical History:   Diagnosis  Date    Disorder of kidney and ureter     Hypertension     SLE (systemic lupus erythematosus)     Stroke     Vertigo        PAST SURGICAL HISTORY:  Past Surgical History:   Procedure Laterality Date    RENAL BIOPSY         FAMILY HISTORY:   Family History   Problem Relation Age of Onset    Diabetes Mother     Cancer Sister         Breast    Stroke Neg Hx     Heart attack Neg Hx        SOCIAL HISTORY:  Social History     Socioeconomic History    Marital status: Single   Tobacco Use    Smoking status: Never Smoker    Smokeless tobacco: Never Used   Substance and Sexual Activity    Alcohol use: No    Drug use: No       ALLERGIES:  Review of patient's allergies indicates:  No Known Allergies    MEDICATIONS:    Current Outpatient Medications:     aspirin (ECOTRIN) 81 MG EC tablet, Take 81 mg by mouth once daily., Disp: , Rfl:     atorvastatin (LIPITOR) 40 MG tablet, Take 1 tablet (40 mg total) by mouth once daily., Disp: 90 tablet, Rfl: 0    hydrOXYchloroQUINE (PLAQUENIL) 200 mg tablet, Take 2 tablets (400 mg total) by mouth every Mon, Tues, Wed, Thurs, Fri., Disp: 180 tablet, Rfl: 1    mycophenolate (CELLCEPT) 500 mg Tab, Take 3 tablets (1,500 mg total) by mouth 2 (two) times daily., Disp: 540 tablet, Rfl: 1    pantoprazole (PROTONIX) 40 MG tablet, Take 1 tablet (40 mg total) by mouth once daily., Disp: 90 tablet, Rfl: 0    predniSONE (DELTASONE) 10 MG tablet, Take 1 tablet (10 mg total) by mouth once daily., Disp: 30 tablet, Rfl: 0    sodium bicarbonate 650 MG tablet, Take 1 tablet (650 mg total) by mouth once daily., Disp: 30 tablet, Rfl: 0    sulfamethoxazole-trimethoprim 400-80mg (BACTRIM,SEPTRA) 400-80 mg per tablet, Take 1 tablet by mouth every Mon, Wed, Fri., Disp: 45 tablet, Rfl: 0    vitamin D (VITAMIN D3) 1000 units Tab, Take 1,000 Units by mouth once daily., Disp: , Rfl:    Medication List with Changes/Refills   Current Medications    ASPIRIN (ECOTRIN) 81 MG EC TABLET    Take 81 mg by  "mouth once daily.    ATORVASTATIN (LIPITOR) 40 MG TABLET    Take 1 tablet (40 mg total) by mouth once daily.    HYDROXYCHLOROQUINE (PLAQUENIL) 200 MG TABLET    Take 2 tablets (400 mg total) by mouth every Mon, Tues, Wed, Thurs, Fri.    MYCOPHENOLATE (CELLCEPT) 500 MG TAB    Take 3 tablets (1,500 mg total) by mouth 2 (two) times daily.    PANTOPRAZOLE (PROTONIX) 40 MG TABLET    Take 1 tablet (40 mg total) by mouth once daily.    PREDNISONE (DELTASONE) 10 MG TABLET    Take 1 tablet (10 mg total) by mouth once daily.    SODIUM BICARBONATE 650 MG TABLET    Take 1 tablet (650 mg total) by mouth once daily.    SULFAMETHOXAZOLE-TRIMETHOPRIM 400-80MG (BACTRIM,SEPTRA) 400-80 MG PER TABLET    Take 1 tablet by mouth every Mon, Wed, Fri.    VITAMIN D (VITAMIN D3) 1000 UNITS TAB    Take 1,000 Units by mouth once daily.        PHYSICAL EXAM:  /80   Pulse 72   Ht 5' 3" (1.6 m)   Wt 54.3 kg (119 lb 11.4 oz)   SpO2 96%   BMI 21.21 kg/m²     General: No distress, No fever or chills  Neck: No adenopathy,no carotid bruit,no JVD  Lungs:Clear to auscultation bilaterally, No Crackles  Heart: Regular rate and rhythm, no murmur, gallops or rubs  Abdomen: Soft, no tenderness, bowel sounds normal  Extremities: Atraumatic, no edema in LE  Skin: Turgor normal. Rash on face  Neurologic: No focal weakness, oriented.  Dialysis Access: Non applicable        LABS:  Lab Results   Component Value Date    HGB 10.5 (L) 01/07/2022        Lab Results   Component Value Date    CREATININE 1.1 01/07/2022       Prot/Creat Ratio, Urine   Date Value Ref Range Status   01/07/2022 0.27 (H) 0.00 - 0.20 Final   10/28/2021 0.39 (H) 0.00 - 0.20 Final   09/24/2021 0.42 (H) 0.00 - 0.20 Final       Lab Results   Component Value Date     01/07/2022    K 5.0 01/07/2022    CO2 26 01/07/2022       last PTH   Lab Results   Component Value Date    PTH 53.0 07/19/2021    CALCIUM 9.2 01/07/2022    PHOS 4.1 01/07/2022       Lab Results   Component Value Date    " HGBA1C 5.5 04/02/2021       Lab Results   Component Value Date    LDLCALC 126.8 04/02/2021         Creatinine, Urine   Date Value Ref Range Status   01/07/2022 139.0 15.0 - 325.0 mg/dL Final   10/28/2021 91.8 15.0 - 325.0 mg/dL Final   09/24/2021 115.0 15.0 - 325.0 mg/dL Final       Protein, Urine Random   Date Value Ref Range Status   01/07/2022 37 (H) 0 - 15 mg/dL Final   10/28/2021 36 (H) 0 - 15 mg/dL Final   09/24/2021 48 (H) 0 - 15 mg/dL Final       Prot/Creat Ratio, Urine   Date Value Ref Range Status   01/07/2022 0.27 (H) 0.00 - 0.20 Final   10/28/2021 0.39 (H) 0.00 - 0.20 Final   09/24/2021 0.42 (H) 0.00 - 0.20 Final         ASSESSMENT:    1) Lupus Nephritis unknown class  2) Acute Kidney Injury on back ground of Lupus Nephritis  3) Hyperkalemia   Pt was reportedly seeing Dr. Adamson in 2018 and it is unclear if she is seeing any nephrologist since then. Last rheumatology clinic notes outside [facility] stated that patient is on Cellcept 3 Gr/day, Plaquenil 200 mg BID and prednisone 2.5 mg daily. Due to proteinuria there was discussion about kidney biopsy too. There was also a question about patients medication compliance in the past due to patients forgetfulness. Her ultrasound 4/2021 showed 9.8 and 9.2 cm kidneys.   During her first clinic visit (around April 2021) her creatinine came back at 1.8 which is above her baseline of less than one and hyperkalemia. Discontinued her lisinopril and her BLAYNE and hyperkalemia improved making sublinical hypotension likely etiology for this. Urine microscopy and urine analysis did not show significant RBC, protein creatinine ratio did not show significant proteinuria.   Next clinic visit in July 2021 showed elevated creatinine and proteinuria. Started on prednisone which lead to improvement in kidney function and proteinuria. Requested patient to get a kidney biopsy, however she was hesitant. Re-discussed about Benlysta and kidney biopsy patient did not want them at  this time. She also wants to get the prednisone dose decrease, I worry that her chance of relapse is high and discussed the same.   Acid Base, Hemoglobin, Bone Mineral in acceptable range.    - Continue Prednisone at 10 mg daily, Bactrim stopped  - Continue Cellcept 3 Gr/day, plaquenil 200 mg BID (Mon-Fri).  - Continue to hold lisinopril for now.  - Discussed with daughter that patients lupus is not well controlled and we need to increase or change treatment to see if we can get it under remission. Discussed about other IS meds. Patient continues to decline Biopsy and Benlysta/Volcosporin. Explained that if we dont try to get it under control we might have early long term consequences of lupus.  - Inform me if the blood pressure starts going above 130 mmhg top number  - Avoid NSAIDs intake  - Please be compliant with medications      RTC in 2 months    Diagnosis and plan of care explained to the patient and daughter at length.  Verbalized understanding. Answered all questions.  Thanks for allowing me to participate in the care of this patient.     1:47 PM    Franko Faria MD  Nephrology & Critical Care    34 minutes of total time spent on the encounter, which includes face to face time and non-face to face time preparing to see the patient (eg, review of tests), Obtaining and/or reviewing separately obtained history, documenting clinical information in the electronic or other health record, independently interpreting results (not separately reported) and communicating results to the patient/family/caregiver, or Care coordination (not separately reported).

## 2022-01-11 LAB
DNA TITER: NORMAL
DSDNA AB SER-ACNC: POSITIVE [IU]/ML

## 2022-01-19 ENCOUNTER — HOME CARE VISIT (OUTPATIENT)
Dept: NEUROLOGY | Facility: HOSPITAL | Age: 47
End: 2022-01-19
Payer: MEDICARE

## 2022-01-21 VITALS
WEIGHT: 121 LBS | SYSTOLIC BLOOD PRESSURE: 118 MMHG | HEART RATE: 77 BPM | BODY MASS INDEX: 21.43 KG/M2 | DIASTOLIC BLOOD PRESSURE: 70 MMHG

## 2022-02-07 ENCOUNTER — LAB VISIT (OUTPATIENT)
Dept: LAB | Facility: HOSPITAL | Age: 47
End: 2022-02-07
Attending: INTERNAL MEDICINE
Payer: MEDICARE

## 2022-02-07 DIAGNOSIS — M32.9 SYSTEMIC LUPUS ERYTHEMATOSUS, UNSPECIFIED SLE TYPE, UNSPECIFIED ORGAN INVOLVEMENT STATUS: ICD-10-CM

## 2022-02-07 LAB
ALBUMIN SERPL BCP-MCNC: 3.4 G/DL (ref 3.5–5.2)
ALBUMIN SERPL BCP-MCNC: 3.4 G/DL (ref 3.5–5.2)
ANION GAP SERPL CALC-SCNC: 8 MMOL/L (ref 8–16)
ANION GAP SERPL CALC-SCNC: 8 MMOL/L (ref 8–16)
BASOPHILS # BLD AUTO: 0.02 K/UL (ref 0–0.2)
BASOPHILS # BLD AUTO: 0.02 K/UL (ref 0–0.2)
BASOPHILS NFR BLD: 0.5 % (ref 0–1.9)
BASOPHILS NFR BLD: 0.5 % (ref 0–1.9)
BUN SERPL-MCNC: 11 MG/DL (ref 6–20)
BUN SERPL-MCNC: 11 MG/DL (ref 6–20)
CALCIUM SERPL-MCNC: 9.2 MG/DL (ref 8.7–10.5)
CALCIUM SERPL-MCNC: 9.2 MG/DL (ref 8.7–10.5)
CHLORIDE SERPL-SCNC: 106 MMOL/L (ref 95–110)
CHLORIDE SERPL-SCNC: 106 MMOL/L (ref 95–110)
CO2 SERPL-SCNC: 25 MMOL/L (ref 23–29)
CO2 SERPL-SCNC: 25 MMOL/L (ref 23–29)
CREAT SERPL-MCNC: 0.8 MG/DL (ref 0.5–1.4)
CREAT SERPL-MCNC: 0.8 MG/DL (ref 0.5–1.4)
DIFFERENTIAL METHOD: ABNORMAL
DIFFERENTIAL METHOD: ABNORMAL
EOSINOPHIL # BLD AUTO: 0 K/UL (ref 0–0.5)
EOSINOPHIL # BLD AUTO: 0 K/UL (ref 0–0.5)
EOSINOPHIL NFR BLD: 0.3 % (ref 0–8)
EOSINOPHIL NFR BLD: 0.3 % (ref 0–8)
ERYTHROCYTE [DISTWIDTH] IN BLOOD BY AUTOMATED COUNT: 15.2 % (ref 11.5–14.5)
ERYTHROCYTE [DISTWIDTH] IN BLOOD BY AUTOMATED COUNT: 15.2 % (ref 11.5–14.5)
EST. GFR  (AFRICAN AMERICAN): >60 ML/MIN/1.73 M^2
EST. GFR  (AFRICAN AMERICAN): >60 ML/MIN/1.73 M^2
EST. GFR  (NON AFRICAN AMERICAN): >60 ML/MIN/1.73 M^2
EST. GFR  (NON AFRICAN AMERICAN): >60 ML/MIN/1.73 M^2
GLUCOSE SERPL-MCNC: 85 MG/DL (ref 70–110)
GLUCOSE SERPL-MCNC: 85 MG/DL (ref 70–110)
HCT VFR BLD AUTO: 33.3 % (ref 37–48.5)
HCT VFR BLD AUTO: 33.3 % (ref 37–48.5)
HGB BLD-MCNC: 9.8 G/DL (ref 12–16)
HGB BLD-MCNC: 9.8 G/DL (ref 12–16)
IMM GRANULOCYTES # BLD AUTO: 0 K/UL (ref 0–0.04)
IMM GRANULOCYTES # BLD AUTO: 0 K/UL (ref 0–0.04)
IMM GRANULOCYTES NFR BLD AUTO: 0 % (ref 0–0.5)
IMM GRANULOCYTES NFR BLD AUTO: 0 % (ref 0–0.5)
LYMPHOCYTES # BLD AUTO: 1.1 K/UL (ref 1–4.8)
LYMPHOCYTES # BLD AUTO: 1.1 K/UL (ref 1–4.8)
LYMPHOCYTES NFR BLD: 29 % (ref 18–48)
LYMPHOCYTES NFR BLD: 29 % (ref 18–48)
MCH RBC QN AUTO: 25.3 PG (ref 27–31)
MCH RBC QN AUTO: 25.3 PG (ref 27–31)
MCHC RBC AUTO-ENTMCNC: 29.4 G/DL (ref 32–36)
MCHC RBC AUTO-ENTMCNC: 29.4 G/DL (ref 32–36)
MCV RBC AUTO: 86 FL (ref 82–98)
MCV RBC AUTO: 86 FL (ref 82–98)
MONOCYTES # BLD AUTO: 0.5 K/UL (ref 0.3–1)
MONOCYTES # BLD AUTO: 0.5 K/UL (ref 0.3–1)
MONOCYTES NFR BLD: 12.3 % (ref 4–15)
MONOCYTES NFR BLD: 12.3 % (ref 4–15)
NEUTROPHILS # BLD AUTO: 2.2 K/UL (ref 1.8–7.7)
NEUTROPHILS # BLD AUTO: 2.2 K/UL (ref 1.8–7.7)
NEUTROPHILS NFR BLD: 57.9 % (ref 38–73)
NEUTROPHILS NFR BLD: 57.9 % (ref 38–73)
NRBC BLD-RTO: 0 /100 WBC
NRBC BLD-RTO: 0 /100 WBC
PHOSPHATE SERPL-MCNC: 3.4 MG/DL (ref 2.7–4.5)
PHOSPHATE SERPL-MCNC: 3.4 MG/DL (ref 2.7–4.5)
PLATELET # BLD AUTO: 236 K/UL (ref 150–450)
PLATELET # BLD AUTO: 236 K/UL (ref 150–450)
PMV BLD AUTO: 11.2 FL (ref 9.2–12.9)
PMV BLD AUTO: 11.2 FL (ref 9.2–12.9)
POTASSIUM SERPL-SCNC: 4.7 MMOL/L (ref 3.5–5.1)
POTASSIUM SERPL-SCNC: 4.7 MMOL/L (ref 3.5–5.1)
PTH-INTACT SERPL-MCNC: 47.4 PG/ML (ref 9–77)
RBC # BLD AUTO: 3.87 M/UL (ref 4–5.4)
RBC # BLD AUTO: 3.87 M/UL (ref 4–5.4)
SODIUM SERPL-SCNC: 139 MMOL/L (ref 136–145)
SODIUM SERPL-SCNC: 139 MMOL/L (ref 136–145)
WBC # BLD AUTO: 3.83 K/UL (ref 3.9–12.7)
WBC # BLD AUTO: 3.83 K/UL (ref 3.9–12.7)

## 2022-02-07 PROCEDURE — 86225 DNA ANTIBODY NATIVE: CPT | Performed by: INTERNAL MEDICINE

## 2022-02-07 PROCEDURE — 80069 RENAL FUNCTION PANEL: CPT | Performed by: INTERNAL MEDICINE

## 2022-02-07 PROCEDURE — 86225 DNA ANTIBODY NATIVE: CPT | Mod: 59 | Performed by: INTERNAL MEDICINE

## 2022-02-07 PROCEDURE — 85025 COMPLETE CBC W/AUTO DIFF WBC: CPT | Performed by: INTERNAL MEDICINE

## 2022-02-07 PROCEDURE — 36415 COLL VENOUS BLD VENIPUNCTURE: CPT | Performed by: INTERNAL MEDICINE

## 2022-02-07 PROCEDURE — 83970 ASSAY OF PARATHORMONE: CPT | Performed by: INTERNAL MEDICINE

## 2022-02-09 LAB
DNA TITER: NORMAL
DSDNA AB SER-ACNC: POSITIVE [IU]/ML
DSDNA AB SER-ACNC: POSITIVE [IU]/ML

## 2022-02-14 ENCOUNTER — OFFICE VISIT (OUTPATIENT)
Dept: NEPHROLOGY | Facility: CLINIC | Age: 47
End: 2022-02-14
Payer: MEDICARE

## 2022-02-14 VITALS
BODY MASS INDEX: 22.19 KG/M2 | DIASTOLIC BLOOD PRESSURE: 72 MMHG | WEIGHT: 125.25 LBS | SYSTOLIC BLOOD PRESSURE: 118 MMHG | HEIGHT: 63 IN

## 2022-02-14 DIAGNOSIS — M32.9 SYSTEMIC LUPUS ERYTHEMATOSUS, UNSPECIFIED SLE TYPE, UNSPECIFIED ORGAN INVOLVEMENT STATUS: ICD-10-CM

## 2022-02-14 PROCEDURE — 99214 PR OFFICE/OUTPT VISIT, EST, LEVL IV, 30-39 MIN: ICD-10-PCS | Mod: S$PBB,,, | Performed by: INTERNAL MEDICINE

## 2022-02-14 PROCEDURE — 99214 OFFICE O/P EST MOD 30 MIN: CPT | Mod: S$PBB,,, | Performed by: INTERNAL MEDICINE

## 2022-02-14 PROCEDURE — 99999 PR PBB SHADOW E&M-EST. PATIENT-LVL III: CPT | Mod: PBBFAC,,, | Performed by: INTERNAL MEDICINE

## 2022-02-14 PROCEDURE — 99213 OFFICE O/P EST LOW 20 MIN: CPT | Mod: PBBFAC | Performed by: INTERNAL MEDICINE

## 2022-02-14 PROCEDURE — 99999 PR PBB SHADOW E&M-EST. PATIENT-LVL III: ICD-10-PCS | Mod: PBBFAC,,, | Performed by: INTERNAL MEDICINE

## 2022-02-14 RX ORDER — PREDNISONE 5 MG/1
10 TABLET ORAL DAILY
Qty: 120 TABLET | Refills: 0 | Status: SHIPPED | OUTPATIENT
Start: 2022-02-14 | End: 2022-04-20 | Stop reason: SDUPTHER

## 2022-02-14 NOTE — PROGRESS NOTES
REASON FOR CONSULT/CHIEF COMPLAIN: Acute renal failure, h/o Lupus nephritis    REFERRING PHYSICIAN: [facility] -Primary Care    HISTORY OF PRESENT ILLNESS: 46 y.o. female  has a past medical history of Disorder of kidney and ureter, Hypertension, SLE (systemic lupus erythematosus), Stroke, and Vertigo. patient was referred here for abnormal renal function. Denied chronic NSAID use, no known exposure to lithium, lead.   Early 2000's she was first time diagnosed with Lupus after a kidney biopsy at [facility], the results are not available now. She also had mild/small strokes at that time. She was give some infusions at that time like every two weeks (she thinks its cyclophosphamide). Since then she reportedly has been on Cellcept, Plaquenil and prednisone. She reports that her medications have not been changed. On reviewing the limited records it is unclear what dose of Cellcept she was taking, she has two cellcept pill bottles at home, one said 500 MG one tablet TID, other said 500 mg three tablets BID. She had BLAYNE with proteinuria in July 2021, wanted to get a kidney biopsy but the patient was hesitant. Gave her high dose prednisone starting July 2021 which improved the BLAYNE and proteinuria. Currently on taper.   Denied any new issues with urination including dysuria, hematuria, urgency, hesitancy, nocturia, incomplete voiding. Denied chest pain, nausea, vomiting, abd pain, nausea, vomiting, diarrhea, shortness of breath, pedal edema, Orthopnea, PND.  Home Blood pressures are not checked     ROS:  General: negative for chills, or fatigue  Respiratory: No cough, shortness of breath, or wheezing  Cardiovascular: No chest pain or dyspnea   Gastrointestinal: No abdominal pain, change in bowel habits  Genito-Urinary: No dysuria, trouble voiding, or hematuria  Neurological: No new focal weakness, no numbness  Dermatological: No rash or ulcers.    PAST MEDICAL HISTORY:  Past Medical History:   Diagnosis  Date    Disorder of kidney and ureter     Hypertension     SLE (systemic lupus erythematosus)     Stroke     Vertigo        PAST SURGICAL HISTORY:  Past Surgical History:   Procedure Laterality Date    RENAL BIOPSY         FAMILY HISTORY:   Family History   Problem Relation Age of Onset    Diabetes Mother     Cancer Sister         Breast    Stroke Neg Hx     Heart attack Neg Hx        SOCIAL HISTORY:  Social History     Socioeconomic History    Marital status: Single   Tobacco Use    Smoking status: Never Smoker    Smokeless tobacco: Never Used   Substance and Sexual Activity    Alcohol use: No    Drug use: No       ALLERGIES:  Review of patient's allergies indicates:  No Known Allergies    MEDICATIONS:    Current Outpatient Medications:     aspirin (ECOTRIN) 81 MG EC tablet, Take 81 mg by mouth once daily., Disp: , Rfl:     atorvastatin (LIPITOR) 40 MG tablet, Take 1 tablet (40 mg total) by mouth once daily., Disp: 90 tablet, Rfl: 0    hydrOXYchloroQUINE (PLAQUENIL) 200 mg tablet, Take 2 tablets (400 mg total) by mouth every Mon, Tues, Wed, Thurs, Fri., Disp: 180 tablet, Rfl: 1    mycophenolate (CELLCEPT) 500 mg Tab, Take 3 tablets (1,500 mg total) by mouth 2 (two) times daily., Disp: 540 tablet, Rfl: 1    pantoprazole (PROTONIX) 40 MG tablet, Take 1 tablet (40 mg total) by mouth once daily., Disp: 90 tablet, Rfl: 0    predniSONE (DELTASONE) 10 MG tablet, Take 1 tablet (10 mg total) by mouth once daily., Disp: 30 tablet, Rfl: 0    sodium bicarbonate 650 MG tablet, Take 1 tablet (650 mg total) by mouth once daily., Disp: 30 tablet, Rfl: 0    sulfamethoxazole-trimethoprim 400-80mg (BACTRIM,SEPTRA) 400-80 mg per tablet, Take 1 tablet by mouth every Mon, Wed, Fri., Disp: 45 tablet, Rfl: 0    vitamin D (VITAMIN D3) 1000 units Tab, Take 1,000 Units by mouth once daily., Disp: , Rfl:    Medication List with Changes/Refills   Current Medications    ASPIRIN (ECOTRIN) 81 MG EC TABLET    Take 81 mg by  "mouth once daily.    ATORVASTATIN (LIPITOR) 40 MG TABLET    Take 1 tablet (40 mg total) by mouth once daily.    HYDROXYCHLOROQUINE (PLAQUENIL) 200 MG TABLET    Take 2 tablets (400 mg total) by mouth every Mon, Tues, Wed, Thurs, Fri.    MYCOPHENOLATE (CELLCEPT) 500 MG TAB    Take 3 tablets (1,500 mg total) by mouth 2 (two) times daily.    PANTOPRAZOLE (PROTONIX) 40 MG TABLET    Take 1 tablet (40 mg total) by mouth once daily.    PREDNISONE (DELTASONE) 10 MG TABLET    Take 1 tablet (10 mg total) by mouth once daily.    SODIUM BICARBONATE 650 MG TABLET    Take 1 tablet (650 mg total) by mouth once daily.    SULFAMETHOXAZOLE-TRIMETHOPRIM 400-80MG (BACTRIM,SEPTRA) 400-80 MG PER TABLET    Take 1 tablet by mouth every Mon, Wed, Fri.    VITAMIN D (VITAMIN D3) 1000 UNITS TAB    Take 1,000 Units by mouth once daily.        PHYSICAL EXAM:  /72   Ht 5' 3" (1.6 m)   Wt 56.8 kg (125 lb 3.5 oz)   BMI 22.18 kg/m²     General: No distress, No fever or chills  Neck: No adenopathy,no carotid bruit,no JVD  Lungs:Clear to auscultation bilaterally, No Crackles  Heart: Regular rate and rhythm, no murmur, gallops or rubs  Abdomen: Soft, no tenderness, bowel sounds normal  Extremities: Atraumatic, no edema in LE  Skin: Turgor normal. Rash on face  Neurologic: No focal weakness, oriented.  Dialysis Access: Non applicable        LABS:  Lab Results   Component Value Date    HGB 9.8 (L) 02/07/2022    HGB 9.8 (L) 02/07/2022        Lab Results   Component Value Date    CREATININE 0.8 02/07/2022    CREATININE 0.8 02/07/2022       Prot/Creat Ratio, Urine   Date Value Ref Range Status   02/07/2022 0.43 (H) 0.00 - 0.20 Final   02/07/2022 0.43 (H) 0.00 - 0.20 Final   01/07/2022 0.27 (H) 0.00 - 0.20 Final       Lab Results   Component Value Date     02/07/2022     02/07/2022    K 4.7 02/07/2022    K 4.7 02/07/2022    CO2 25 02/07/2022    CO2 25 02/07/2022       last PTH   Lab Results   Component Value Date    PTH 47.4 02/07/2022    " CALCIUM 9.2 02/07/2022    CALCIUM 9.2 02/07/2022    PHOS 3.4 02/07/2022    PHOS 3.4 02/07/2022       Lab Results   Component Value Date    HGBA1C 5.5 04/02/2021       Lab Results   Component Value Date    LDLCALC 126.8 04/02/2021         Creatinine, Urine   Date Value Ref Range Status   02/07/2022 54.0 15.0 - 325.0 mg/dL Final   02/07/2022 54.0 15.0 - 325.0 mg/dL Final   01/07/2022 139.0 15.0 - 325.0 mg/dL Final       Protein, Urine Random   Date Value Ref Range Status   02/07/2022 23 (H) 0 - 15 mg/dL Final   02/07/2022 23 (H) 0 - 15 mg/dL Final   01/07/2022 37 (H) 0 - 15 mg/dL Final       Prot/Creat Ratio, Urine   Date Value Ref Range Status   02/07/2022 0.43 (H) 0.00 - 0.20 Final   02/07/2022 0.43 (H) 0.00 - 0.20 Final   01/07/2022 0.27 (H) 0.00 - 0.20 Final         ASSESSMENT:    1) Lupus Nephritis unknown class partial remission  2) Acute Kidney Injury on back ground of Lupus Nephritis  3) Hyperkalemia   Pt was reportedly seeing Dr. Adamson in 2018 and it is unclear if she is seeing any nephrologist since then. Last rheumatology clinic notes outside [facility] system stated that patient is on Cellcept 3 Gr/day, Plaquenil 200 mg BID and prednisone 2.5 mg daily. Due to proteinuria there was discussion about kidney biopsy too. There was also a question about patients medication compliance in the past due to patients forgetfulness. Her ultrasound 4/2021 showed 9.8 and 9.2 cm kidneys.   During her first clinic visit (around April 2021) her creatinine came back at 1.8 which is above her baseline of less than one and hyperkalemia. Discontinued her lisinopril and her BLAYNE and hyperkalemia improved making sublinical hypotension likely etiology for this. Urine microscopy and urine analysis did not show significant RBC, protein creatinine ratio did not show significant proteinuria.   Next clinic visit in July 2021 showed elevated creatinine and proteinuria. Started on prednisone which lead to improvement in kidney function and  proteinuria. Requested patient to get a kidney biopsy, however she was hesitant. Re-discussed about Benlysta and kidney biopsy patient did not want them at this time. She also wants to get the prednisone dose decrease, I worry that her chance of relapse is high and discussed the same.   Acid Base, Hemoglobin, Bone Mineral in acceptable range.    - Continue Prednisone at 10 mg daily, Bactrim stopped  - Continue Cellcept 3 Gr/day, plaquenil 200 mg BID (Mon-Fri).  - Continue to hold lisinopril for now.  -( Discussed with daughter in previous visit that patients lupus is not well controlled and we need to increase or change treatment to see if we can get it under remission. Discussed about other IS meds. Patient continues to decline Biopsy and Benlysta/Volcosporin. Explained that if we dont try to get it under control we might have early long term consequences of lupus.)  - Inform me if the blood pressure starts going above 130 mmhg top number  - Avoid NSAIDs intake  - Please be compliant with medications      RTC in 2 months    Diagnosis and plan of care explained to the patient and daughter at length.  Verbalized understanding. Answered all questions.  Thanks for allowing me to participate in the care of this patient.     1:47 PM    Franko Faria MD  Nephrology & Critical Care    25 minutes of total time spent on the encounter, which includes face to face time and non-face to face time preparing to see the patient (eg, review of tests), Obtaining and/or reviewing separately obtained history, documenting clinical information in the electronic or other health record, independently interpreting results (not separately reported) and communicating results to the patient/family/caregiver, or Care coordination (not separately reported).

## 2022-02-14 NOTE — PATIENT INSTRUCTIONS
- Please compare the medications you are taking at home with the list we made today and make sure you are taking all of them. If there are other medications which are not in the list please put them aside and let me know what they are.

## 2022-02-21 ENCOUNTER — HOME CARE VISIT (OUTPATIENT)
Dept: NEUROLOGY | Facility: HOSPITAL | Age: 47
End: 2022-02-21
Payer: MEDICARE

## 2022-02-22 VITALS
DIASTOLIC BLOOD PRESSURE: 64 MMHG | WEIGHT: 121 LBS | HEART RATE: 80 BPM | BODY MASS INDEX: 21.43 KG/M2 | SYSTOLIC BLOOD PRESSURE: 120 MMHG

## 2022-02-22 NOTE — PROGRESS NOTES
Ms. Arevalo denies any ER visits or hospital stays. VS are WNL on today's visit. LHE reinforced education on stroke RF.

## 2022-03-08 ENCOUNTER — LAB VISIT (OUTPATIENT)
Dept: LAB | Facility: HOSPITAL | Age: 47
End: 2022-03-08
Attending: INTERNAL MEDICINE
Payer: MEDICARE

## 2022-03-08 DIAGNOSIS — M32.9 SYSTEMIC LUPUS ERYTHEMATOSUS, UNSPECIFIED SLE TYPE, UNSPECIFIED ORGAN INVOLVEMENT STATUS: ICD-10-CM

## 2022-03-08 PROCEDURE — 81001 URINALYSIS AUTO W/SCOPE: CPT | Performed by: INTERNAL MEDICINE

## 2022-03-08 PROCEDURE — 84156 ASSAY OF PROTEIN URINE: CPT | Performed by: INTERNAL MEDICINE

## 2022-03-09 LAB
BACTERIA #/AREA URNS AUTO: ABNORMAL /HPF
BILIRUB UR QL STRIP: NEGATIVE
CLARITY UR REFRACT.AUTO: ABNORMAL
COLOR UR AUTO: YELLOW
CREAT UR-MCNC: 192 MG/DL (ref 15–325)
GLUCOSE UR QL STRIP: NEGATIVE
HGB UR QL STRIP: ABNORMAL
HYALINE CASTS UR QL AUTO: 0 /LPF
KETONES UR QL STRIP: NEGATIVE
LEUKOCYTE ESTERASE UR QL STRIP: ABNORMAL
MICROSCOPIC COMMENT: ABNORMAL
NITRITE UR QL STRIP: NEGATIVE
PH UR STRIP: 5 [PH] (ref 5–8)
PROT UR QL STRIP: ABNORMAL
PROT UR-MCNC: 72 MG/DL (ref 0–15)
PROT/CREAT UR: 0.38 MG/G{CREAT} (ref 0–0.2)
RBC #/AREA URNS AUTO: 6 /HPF (ref 0–4)
SP GR UR STRIP: 1.02 (ref 1–1.03)
SQUAMOUS #/AREA URNS AUTO: 3 /HPF
URN SPEC COLLECT METH UR: ABNORMAL
WBC #/AREA URNS AUTO: 12 /HPF (ref 0–5)

## 2022-03-21 ENCOUNTER — HOME CARE VISIT (OUTPATIENT)
Dept: NEUROLOGY | Facility: HOSPITAL | Age: 47
End: 2022-03-21
Payer: MEDICARE

## 2022-03-22 VITALS
SYSTOLIC BLOOD PRESSURE: 122 MMHG | DIASTOLIC BLOOD PRESSURE: 70 MMHG | HEART RATE: 92 BPM | BODY MASS INDEX: 21.29 KG/M2 | WEIGHT: 120.19 LBS

## 2022-03-22 NOTE — PROGRESS NOTES
Ms. Arevalo VS are WNL on today's visit. She denies any ER visits or hospital stays. LHE reinforced education on low salt diet.

## 2022-04-13 ENCOUNTER — LAB VISIT (OUTPATIENT)
Dept: LAB | Facility: HOSPITAL | Age: 47
End: 2022-04-13
Attending: INTERNAL MEDICINE
Payer: MEDICARE

## 2022-04-13 DIAGNOSIS — M32.9 SYSTEMIC LUPUS ERYTHEMATOSUS, UNSPECIFIED SLE TYPE, UNSPECIFIED ORGAN INVOLVEMENT STATUS: ICD-10-CM

## 2022-04-13 LAB
BACTERIA #/AREA URNS AUTO: ABNORMAL /HPF
BILIRUB UR QL STRIP: NEGATIVE
CLARITY UR REFRACT.AUTO: CLEAR
COLOR UR AUTO: YELLOW
CREAT UR-MCNC: 132 MG/DL (ref 15–325)
GLUCOSE UR QL STRIP: NEGATIVE
HGB UR QL STRIP: ABNORMAL
HYALINE CASTS UR QL AUTO: 0 /LPF
KETONES UR QL STRIP: NEGATIVE
LEUKOCYTE ESTERASE UR QL STRIP: ABNORMAL
MICROSCOPIC COMMENT: ABNORMAL
NITRITE UR QL STRIP: NEGATIVE
PH UR STRIP: 6 [PH] (ref 5–8)
PROT UR QL STRIP: ABNORMAL
PROT UR-MCNC: 91 MG/DL (ref 0–15)
PROT/CREAT UR: 0.69 MG/G{CREAT} (ref 0–0.2)
RBC #/AREA URNS AUTO: 7 /HPF (ref 0–4)
SP GR UR STRIP: 1.02 (ref 1–1.03)
SQUAMOUS #/AREA URNS AUTO: 4 /HPF
URN SPEC COLLECT METH UR: ABNORMAL
WBC #/AREA URNS AUTO: 5 /HPF (ref 0–5)

## 2022-04-13 PROCEDURE — 81001 URINALYSIS AUTO W/SCOPE: CPT | Performed by: INTERNAL MEDICINE

## 2022-04-13 PROCEDURE — 84156 ASSAY OF PROTEIN URINE: CPT | Performed by: INTERNAL MEDICINE

## 2022-04-18 ENCOUNTER — TELEPHONE (OUTPATIENT)
Dept: NEPHROLOGY | Facility: CLINIC | Age: 47
End: 2022-04-18
Payer: MEDICARE

## 2022-04-18 NOTE — TELEPHONE ENCOUNTER
----- Message from Rajni Garduno sent at 4/18/2022 12:09 PM CDT -----  Regarding: Need Later Arrival Time  Appointment  Type:  Need Later Arrival Time  Appointment Request      Name of Caller:Patient need to speak with nurse.  Patient has 2:30pm appointment patient would like to come in around 3:30pm if possible today  When is the first available appointment?  Symptoms:  Best Call Back [phone]  Additional Information:

## 2022-04-20 ENCOUNTER — OFFICE VISIT (OUTPATIENT)
Dept: NEPHROLOGY | Facility: CLINIC | Age: 47
End: 2022-04-20
Payer: MEDICARE

## 2022-04-20 ENCOUNTER — HOME CARE VISIT (OUTPATIENT)
Dept: NEUROLOGY | Facility: HOSPITAL | Age: 47
End: 2022-04-20
Payer: MEDICARE

## 2022-04-20 VITALS
HEIGHT: 63 IN | HEART RATE: 50 BPM | WEIGHT: 121.69 LBS | BODY MASS INDEX: 21.56 KG/M2 | OXYGEN SATURATION: 55 % | SYSTOLIC BLOOD PRESSURE: 140 MMHG | DIASTOLIC BLOOD PRESSURE: 90 MMHG

## 2022-04-20 VITALS
BODY MASS INDEX: 21.61 KG/M2 | DIASTOLIC BLOOD PRESSURE: 72 MMHG | SYSTOLIC BLOOD PRESSURE: 120 MMHG | WEIGHT: 122 LBS | HEART RATE: 80 BPM

## 2022-04-20 DIAGNOSIS — M32.14 LUPUS NEPHRITIS: Primary | ICD-10-CM

## 2022-04-20 DIAGNOSIS — M32.9 RASH DUE TO SYSTEMIC LUPUS ERYTHEMATOSUS (SLE): ICD-10-CM

## 2022-04-20 DIAGNOSIS — M32.9 SYSTEMIC LUPUS ERYTHEMATOSUS, UNSPECIFIED SLE TYPE, UNSPECIFIED ORGAN INVOLVEMENT STATUS: ICD-10-CM

## 2022-04-20 PROCEDURE — 99214 OFFICE O/P EST MOD 30 MIN: CPT | Mod: S$PBB,,, | Performed by: INTERNAL MEDICINE

## 2022-04-20 PROCEDURE — 99999 PR PBB SHADOW E&M-EST. PATIENT-LVL III: ICD-10-PCS | Mod: PBBFAC,,, | Performed by: INTERNAL MEDICINE

## 2022-04-20 PROCEDURE — 99999 PR PBB SHADOW E&M-EST. PATIENT-LVL III: CPT | Mod: PBBFAC,,, | Performed by: INTERNAL MEDICINE

## 2022-04-20 PROCEDURE — 99213 OFFICE O/P EST LOW 20 MIN: CPT | Mod: PBBFAC | Performed by: INTERNAL MEDICINE

## 2022-04-20 PROCEDURE — 99214 PR OFFICE/OUTPT VISIT, EST, LEVL IV, 30-39 MIN: ICD-10-PCS | Mod: S$PBB,,, | Performed by: INTERNAL MEDICINE

## 2022-04-20 RX ORDER — CHOLECALCIFEROL (VITAMIN D3) 25 MCG
1000 TABLET ORAL DAILY
Qty: 90 TABLET | Refills: 1 | Status: SHIPPED | OUTPATIENT
Start: 2022-04-20 | End: 2022-10-12

## 2022-04-20 RX ORDER — PANTOPRAZOLE SODIUM 40 MG/1
40 TABLET, DELAYED RELEASE ORAL DAILY
Qty: 90 EACH | Refills: 0 | Status: SHIPPED | OUTPATIENT
Start: 2022-04-20 | End: 2022-07-15

## 2022-04-20 RX ORDER — ATORVASTATIN CALCIUM 40 MG/1
40 TABLET, FILM COATED ORAL DAILY
Qty: 90 TABLET | Refills: 1 | Status: SHIPPED | OUTPATIENT
Start: 2022-04-20 | End: 2022-07-15

## 2022-04-20 RX ORDER — PREDNISONE 5 MG/1
5 TABLET ORAL DAILY
Qty: 90 TABLET | Refills: 0 | Status: SHIPPED | OUTPATIENT
Start: 2022-04-20 | End: 2022-10-24

## 2022-04-20 RX ORDER — MYCOPHENOLATE MOFETIL 500 MG/1
1500 TABLET ORAL 2 TIMES DAILY
Qty: 540 TABLET | Refills: 1 | Status: SHIPPED | OUTPATIENT
Start: 2022-04-20 | End: 2022-10-24 | Stop reason: SDUPTHER

## 2022-04-20 NOTE — PROGRESS NOTES
Ms. Kenney VS taken. She denies any ER visits or hospital stays. LHE reinforced medication follow up and compliance.

## 2022-04-20 NOTE — PROGRESS NOTES
REASON FOR CONSULT/CHIEF COMPLAIN: Acute renal failure, h/o Lupus nephritis    REFERRING PHYSICIAN: [facility] -Primary Care    HISTORY OF PRESENT ILLNESS: 46 y.o. female  has a past medical history of Disorder of kidney and ureter, Hypertension, SLE (systemic lupus erythematosus), Stroke, and Vertigo. patient was referred here for abnormal renal function. Denied chronic NSAID use, no known exposure to lithium, lead.   Early 2000's she was first time diagnosed with Lupus after a kidney biopsy at [facility], the results are not available now. She also had mild/small strokes at that time. She was give some infusions at that time like every two weeks (she thinks its cyclophosphamide). Since then she reportedly has been on Cellcept, Plaquenil and prednisone. She reports that her medications have not been changed. On reviewing the limited records it is unclear what dose of Cellcept she was taking, she has two cellcept pill bottles at home, one said 500 MG one tablet TID, other said 500 mg three tablets BID. She had BLAYNE with proteinuria in July 2021, wanted to get a kidney biopsy but the patient was hesitant. Gave her high dose prednisone starting July 2021 which improved the BLAYNE and proteinuria, Currently on minimal dose. Seen Rheum who recommended adding Belimumab but the patient did not want to.   Denied any new issues with urination including dysuria, hematuria, urgency, hesitancy, nocturia, incomplete voiding. Denied chest pain, nausea, vomiting, abd pain, nausea, vomiting, diarrhea, shortness of breath, pedal edema, Orthopnea, PND.  Home Blood pressures are not checked     ROS:  General: negative for chills, or fatigue  Respiratory: No cough, shortness of breath, or wheezing  Cardiovascular: No chest pain or dyspnea   Gastrointestinal: No abdominal pain, change in bowel habits  Genito-Urinary: No dysuria, trouble voiding, or hematuria  Neurological: No new focal weakness, no  numbness  Dermatological: No rash or ulcers.    PAST MEDICAL HISTORY:  Past Medical History:   Diagnosis Date    Disorder of kidney and ureter     Hypertension     SLE (systemic lupus erythematosus)     Stroke     Vertigo        PAST SURGICAL HISTORY:  Past Surgical History:   Procedure Laterality Date    RENAL BIOPSY         FAMILY HISTORY:   Family History   Problem Relation Age of Onset    Diabetes Mother     Cancer Sister         Breast    Stroke Neg Hx     Heart attack Neg Hx        SOCIAL HISTORY:  Social History     Socioeconomic History    Marital status: Single   Tobacco Use    Smoking status: Never Smoker    Smokeless tobacco: Never Used   Substance and Sexual Activity    Alcohol use: No    Drug use: No       ALLERGIES:  Review of patient's allergies indicates:  No Known Allergies    MEDICATIONS:    Current Outpatient Medications:     aspirin (ECOTRIN) 81 MG EC tablet, Take 81 mg by mouth once daily., Disp: , Rfl:     atorvastatin (LIPITOR) 40 MG tablet, Take 1 tablet (40 mg total) by mouth once daily., Disp: 90 tablet, Rfl: 0    hydrOXYchloroQUINE (PLAQUENIL) 200 mg tablet, Take 2 tablets (400 mg total) by mouth every Mon, Tues, Wed, Thurs, Fri., Disp: 180 tablet, Rfl: 1    mycophenolate (CELLCEPT) 500 mg Tab, Take 3 tablets (1,500 mg total) by mouth 2 (two) times daily., Disp: 540 tablet, Rfl: 1    pantoprazole (PROTONIX) 40 MG tablet, Take 1 tablet (40 mg total) by mouth once daily., Disp: 90 tablet, Rfl: 0    predniSONE (DELTASONE) 5 MG tablet, Take 2 tablets (10 mg total) by mouth once daily., Disp: 120 tablet, Rfl: 0    vitamin D (VITAMIN D3) 1000 units Tab, Take 1,000 Units by mouth once daily., Disp: , Rfl:    Medication List with Changes/Refills   Current Medications    ASPIRIN (ECOTRIN) 81 MG EC TABLET    Take 81 mg by mouth once daily.    ATORVASTATIN (LIPITOR) 40 MG TABLET    Take 1 tablet (40 mg total) by mouth once daily.    HYDROXYCHLOROQUINE (PLAQUENIL) 200 MG TABLET    " Take 2 tablets (400 mg total) by mouth every Mon, Tues, Wed, Thurs, Fri.    MYCOPHENOLATE (CELLCEPT) 500 MG TAB    Take 3 tablets (1,500 mg total) by mouth 2 (two) times daily.    PANTOPRAZOLE (PROTONIX) 40 MG TABLET    Take 1 tablet (40 mg total) by mouth once daily.    PREDNISONE (DELTASONE) 5 MG TABLET    Take 2 tablets (10 mg total) by mouth once daily.    VITAMIN D (VITAMIN D3) 1000 UNITS TAB    Take 1,000 Units by mouth once daily.        PHYSICAL EXAM:  BP (!) 140/90   Pulse (!) 50   Ht 5' 3" (1.6 m)   Wt 55.2 kg (121 lb 11.1 oz)   SpO2 (!) 55%   BMI 21.56 kg/m²     General: No distress, No fever or chills  Neck: No adenopathy,no carotid bruit,no JVD  Lungs:Clear to auscultation bilaterally, No Crackles  Heart: Regular rate and rhythm, no murmur, gallops or rubs  Abdomen: Soft, no tenderness, bowel sounds normal  Extremities: Atraumatic, no edema in LE  Skin: Turgor normal. Rash on face  Neurologic: No focal weakness, oriented.  Dialysis Access: Non applicable        LABS:  Lab Results   Component Value Date    HGB 9.4 (L) 04/13/2022        Lab Results   Component Value Date    CREATININE 0.8 04/13/2022       Prot/Creat Ratio, Urine   Date Value Ref Range Status   04/13/2022 0.69 (H) 0.00 - 0.20 Final   03/08/2022 0.38 (H) 0.00 - 0.20 Final   02/07/2022 0.43 (H) 0.00 - 0.20 Final   02/07/2022 0.43 (H) 0.00 - 0.20 Final       Lab Results   Component Value Date     04/13/2022    K 3.9 04/13/2022    CO2 25 04/13/2022       last PTH   Lab Results   Component Value Date    PTH 47.4 02/07/2022    CALCIUM 9.0 04/13/2022    PHOS 3.6 04/13/2022       Lab Results   Component Value Date    HGBA1C 5.5 04/02/2021       Lab Results   Component Value Date    LDLCALC 126.8 04/02/2021         Creatinine, Urine   Date Value Ref Range Status   04/13/2022 132.0 15.0 - 325.0 mg/dL Final   03/08/2022 192.0 15.0 - 325.0 mg/dL Final   02/07/2022 54.0 15.0 - 325.0 mg/dL Final   02/07/2022 54.0 15.0 - 325.0 mg/dL Final "       Protein, Urine Random   Date Value Ref Range Status   04/13/2022 91 (H) 0 - 15 mg/dL Final   03/08/2022 72 (H) 0 - 15 mg/dL Final   02/07/2022 23 (H) 0 - 15 mg/dL Final   02/07/2022 23 (H) 0 - 15 mg/dL Final       Prot/Creat Ratio, Urine   Date Value Ref Range Status   04/13/2022 0.69 (H) 0.00 - 0.20 Final   03/08/2022 0.38 (H) 0.00 - 0.20 Final   02/07/2022 0.43 (H) 0.00 - 0.20 Final   02/07/2022 0.43 (H) 0.00 - 0.20 Final         ASSESSMENT:    1) Lupus Nephritis unknown class partial remission  2) Acute Kidney Injury on back ground of Lupus Nephritis  3) Systemic Lupus.  3) Hyperkalemia   Pt was reportedly seeing Dr. Adamson in 2018 and it is unclear if she is seeing any nephrologist since then. Last rheumatology clinic notes outside [facility] system stated that patient is on Cellcept 3 Gr/day, Plaquenil 200 mg BID and prednisone 2.5 mg daily. Due to proteinuria there was discussion about kidney biopsy too. There was also a question about patients medication compliance in the past due to patients forgetfulness. Her ultrasound 4/2021 showed 9.8 and 9.2 cm kidneys.   During her first clinic visit (around April 2021) her creatinine came back at 1.8 which is above her baseline of less than one and hyperkalemia. Discontinued her lisinopril and her BLAYNE and hyperkalemia improved making sublinical hypotension likely etiology for this. Urine microscopy and urine analysis did not show significant RBC, protein creatinine ratio did not show significant proteinuria.   Next clinic visit in July 2021 showed elevated creatinine and proteinuria. Started on prednisone which lead to improvement in kidney function and proteinuria. Requested patient to get a kidney biopsy, however she was hesitant. Re-discussed about Benlysta and kidney biopsy patient did not want them at this time. She also wants to get the prednisone dose decrease, I worry that her chance of relapse is high and discussed the same. Also since July 2021 to April  2022 (10 months duration) she only filled 6 months worth of medication.   Acid Base, Hemoglobin, Bone Mineral in acceptable range.    - Continue Cellcept 3 Gr/day, plaquenil 200 mg BID (Mon-Fri).  - Prednisone at 5 mg.  - Continue to hold lisinopril for now.  -( Discussed with daughter in previous visit that patients lupus is not well controlled and we need to increase or change treatment to see if we can get it under remission. Discussed about other IS meds. Patient continues to decline Biopsy and Benlysta/Volcosporin. Explained that if we dont try to get it under control we might have early long term consequences of lupus.)  - Inform me if the blood pressure starts going above 130 mmhg top number. Today's elevation is being blamed on salt intake. Requested to cut down salt intake.  - Avoid NSAIDs intake  - Please be compliant with medications      RTC in 2 months    Diagnosis and plan of care explained to the patient and daughter at length.  Verbalized understanding. Answered all questions.  Thanks for allowing me to participate in the care of this patient.     1:47 PM    Franko Faria MD  Nephrology & Critical Care    25 minutes of total time spent on the encounter, which includes face to face time and non-face to face time preparing to see the patient (eg, review of tests), Obtaining and/or reviewing separately obtained history, documenting clinical information in the electronic or other health record, independently interpreting results (not separately reported) and communicating results to the patient/family/caregiver, or Care coordination (not separately reported).

## 2022-05-20 ENCOUNTER — HOME CARE VISIT (OUTPATIENT)
Dept: NEUROLOGY | Facility: HOSPITAL | Age: 47
End: 2022-05-20
Payer: MEDICARE

## 2022-05-22 NOTE — PROGRESS NOTES
MsKashif Mickey has completed the SM program. LHE reinforced education of management of stroke RF, and medication compliance.

## 2022-06-17 ENCOUNTER — LAB VISIT (OUTPATIENT)
Dept: LAB | Facility: HOSPITAL | Age: 47
End: 2022-06-17
Attending: INTERNAL MEDICINE
Payer: MEDICARE

## 2022-06-17 DIAGNOSIS — M32.9 SYSTEMIC LUPUS ERYTHEMATOSUS, UNSPECIFIED SLE TYPE, UNSPECIFIED ORGAN INVOLVEMENT STATUS: ICD-10-CM

## 2022-06-17 LAB
ALBUMIN SERPL BCP-MCNC: 3.6 G/DL (ref 3.5–5.2)
ANION GAP SERPL CALC-SCNC: 11 MMOL/L (ref 8–16)
BASOPHILS # BLD AUTO: 0.01 K/UL (ref 0–0.2)
BASOPHILS NFR BLD: 0.3 % (ref 0–1.9)
BUN SERPL-MCNC: 13 MG/DL (ref 6–20)
C3 SERPL-MCNC: 67 MG/DL (ref 50–180)
C4 SERPL-MCNC: 17 MG/DL (ref 11–44)
CALCIUM SERPL-MCNC: 9.5 MG/DL (ref 8.7–10.5)
CHLORIDE SERPL-SCNC: 107 MMOL/L (ref 95–110)
CO2 SERPL-SCNC: 21 MMOL/L (ref 23–29)
CREAT SERPL-MCNC: 0.9 MG/DL (ref 0.5–1.4)
DIFFERENTIAL METHOD: ABNORMAL
EOSINOPHIL # BLD AUTO: 0 K/UL (ref 0–0.5)
EOSINOPHIL NFR BLD: 0.3 % (ref 0–8)
ERYTHROCYTE [DISTWIDTH] IN BLOOD BY AUTOMATED COUNT: 14.2 % (ref 11.5–14.5)
EST. GFR  (AFRICAN AMERICAN): >60 ML/MIN/1.73 M^2
EST. GFR  (NON AFRICAN AMERICAN): >60 ML/MIN/1.73 M^2
GLUCOSE SERPL-MCNC: 71 MG/DL (ref 70–110)
HCT VFR BLD AUTO: 33 % (ref 37–48.5)
HGB BLD-MCNC: 10.7 G/DL (ref 12–16)
IMM GRANULOCYTES # BLD AUTO: 0.01 K/UL (ref 0–0.04)
IMM GRANULOCYTES NFR BLD AUTO: 0.3 % (ref 0–0.5)
LYMPHOCYTES # BLD AUTO: 1.2 K/UL (ref 1–4.8)
LYMPHOCYTES NFR BLD: 30.1 % (ref 18–48)
MCH RBC QN AUTO: 25.5 PG (ref 27–31)
MCHC RBC AUTO-ENTMCNC: 32.4 G/DL (ref 32–36)
MCV RBC AUTO: 79 FL (ref 82–98)
MONOCYTES # BLD AUTO: 0.4 K/UL (ref 0.3–1)
MONOCYTES NFR BLD: 9.7 % (ref 4–15)
NEUTROPHILS # BLD AUTO: 2.3 K/UL (ref 1.8–7.7)
NEUTROPHILS NFR BLD: 59.3 % (ref 38–73)
NRBC BLD-RTO: 0 /100 WBC
PHOSPHATE SERPL-MCNC: 4 MG/DL (ref 2.7–4.5)
PLATELET # BLD AUTO: 144 K/UL (ref 150–450)
PMV BLD AUTO: 11.4 FL (ref 9.2–12.9)
POTASSIUM SERPL-SCNC: 4.8 MMOL/L (ref 3.5–5.1)
RBC # BLD AUTO: 4.2 M/UL (ref 4–5.4)
SODIUM SERPL-SCNC: 139 MMOL/L (ref 136–145)
WBC # BLD AUTO: 3.92 K/UL (ref 3.9–12.7)

## 2022-06-17 PROCEDURE — 80069 RENAL FUNCTION PANEL: CPT | Performed by: INTERNAL MEDICINE

## 2022-06-17 PROCEDURE — 36415 COLL VENOUS BLD VENIPUNCTURE: CPT | Performed by: INTERNAL MEDICINE

## 2022-06-17 PROCEDURE — 86160 COMPLEMENT ANTIGEN: CPT | Mod: 59 | Performed by: INTERNAL MEDICINE

## 2022-06-17 PROCEDURE — 85025 COMPLETE CBC W/AUTO DIFF WBC: CPT | Performed by: INTERNAL MEDICINE

## 2022-06-17 PROCEDURE — 86160 COMPLEMENT ANTIGEN: CPT | Performed by: INTERNAL MEDICINE

## 2022-06-17 PROCEDURE — 86225 DNA ANTIBODY NATIVE: CPT | Performed by: INTERNAL MEDICINE

## 2022-06-17 PROCEDURE — 86225 DNA ANTIBODY NATIVE: CPT | Mod: 59 | Performed by: INTERNAL MEDICINE

## 2022-06-20 ENCOUNTER — OFFICE VISIT (OUTPATIENT)
Dept: NEPHROLOGY | Facility: CLINIC | Age: 47
End: 2022-06-20
Payer: MEDICARE

## 2022-06-20 VITALS
HEIGHT: 63 IN | BODY MASS INDEX: 21.36 KG/M2 | HEART RATE: 91 BPM | WEIGHT: 120.56 LBS | SYSTOLIC BLOOD PRESSURE: 106 MMHG | DIASTOLIC BLOOD PRESSURE: 75 MMHG

## 2022-06-20 DIAGNOSIS — M32.9 SYSTEMIC LUPUS ERYTHEMATOSUS, UNSPECIFIED SLE TYPE, UNSPECIFIED ORGAN INVOLVEMENT STATUS: ICD-10-CM

## 2022-06-20 DIAGNOSIS — M32.14 LUPUS NEPHRITIS: Primary | ICD-10-CM

## 2022-06-20 PROCEDURE — 99215 OFFICE O/P EST HI 40 MIN: CPT | Mod: S$PBB,,, | Performed by: INTERNAL MEDICINE

## 2022-06-20 PROCEDURE — 99999 PR PBB SHADOW E&M-EST. PATIENT-LVL III: CPT | Mod: PBBFAC,,, | Performed by: INTERNAL MEDICINE

## 2022-06-20 PROCEDURE — 99215 PR OFFICE/OUTPT VISIT, EST, LEVL V, 40-54 MIN: ICD-10-PCS | Mod: S$PBB,,, | Performed by: INTERNAL MEDICINE

## 2022-06-20 PROCEDURE — 99999 PR PBB SHADOW E&M-EST. PATIENT-LVL III: ICD-10-PCS | Mod: PBBFAC,,, | Performed by: INTERNAL MEDICINE

## 2022-06-20 PROCEDURE — 99213 OFFICE O/P EST LOW 20 MIN: CPT | Mod: PBBFAC | Performed by: INTERNAL MEDICINE

## 2022-06-20 RX ORDER — HYDROXYCHLOROQUINE SULFATE 200 MG/1
400 TABLET, FILM COATED ORAL
COMMUNITY
End: 2022-07-07

## 2022-06-21 LAB
DNA TITER: NORMAL
DSDNA AB SER-ACNC: POSITIVE [IU]/ML

## 2022-06-22 ENCOUNTER — TELEPHONE (OUTPATIENT)
Dept: NEPHROLOGY | Facility: CLINIC | Age: 47
End: 2022-06-22
Payer: MEDICARE

## 2022-06-22 NOTE — TELEPHONE ENCOUNTER
Spoke with someone from the lab he stated the urine cup didn't have any labels so it would have to be redone.                ----- Message from Ariana Cheema sent at 6/22/2022  1:52 AM CDT -----  Regarding: Lab Client Services  Contact: [phone]  Good Morning,     My name is Ariana Cheema I work in the Lab Client Services. We had a problem with some lab work on this patient. If someone from your office could call us at [phone] or ext. [phone] that would be great. Anyone in my department can help.      Thank you

## 2022-06-28 ENCOUNTER — OFFICE VISIT (OUTPATIENT)
Dept: RHEUMATOLOGY | Facility: CLINIC | Age: 47
End: 2022-06-28
Payer: MEDICARE

## 2022-06-28 VITALS
WEIGHT: 117.94 LBS | HEART RATE: 51 BPM | TEMPERATURE: 100 F | SYSTOLIC BLOOD PRESSURE: 110 MMHG | BODY MASS INDEX: 20.14 KG/M2 | HEIGHT: 64 IN | DIASTOLIC BLOOD PRESSURE: 73 MMHG

## 2022-06-28 DIAGNOSIS — Z79.899 LONG-TERM USE OF HYDROXYCHLOROQUINE: ICD-10-CM

## 2022-06-28 DIAGNOSIS — M32.9 SYSTEMIC LUPUS ERYTHEMATOSUS, UNSPECIFIED SLE TYPE, UNSPECIFIED ORGAN INVOLVEMENT STATUS: Primary | ICD-10-CM

## 2022-06-28 DIAGNOSIS — Z79.60 LONG-TERM USE OF IMMUNOSUPPRESSANT MEDICATION: ICD-10-CM

## 2022-06-28 DIAGNOSIS — Z79.52 LONG TERM (CURRENT) USE OF SYSTEMIC STEROIDS: ICD-10-CM

## 2022-06-28 PROCEDURE — 99214 OFFICE O/P EST MOD 30 MIN: CPT | Mod: S$PBB,,, | Performed by: INTERNAL MEDICINE

## 2022-06-28 PROCEDURE — 99999 PR PBB SHADOW E&M-EST. PATIENT-LVL IV: CPT | Mod: PBBFAC,,, | Performed by: INTERNAL MEDICINE

## 2022-06-28 PROCEDURE — 99214 PR OFFICE/OUTPT VISIT, EST, LEVL IV, 30-39 MIN: ICD-10-PCS | Mod: S$PBB,,, | Performed by: INTERNAL MEDICINE

## 2022-06-28 PROCEDURE — 99999 PR PBB SHADOW E&M-EST. PATIENT-LVL IV: ICD-10-PCS | Mod: PBBFAC,,, | Performed by: INTERNAL MEDICINE

## 2022-06-28 PROCEDURE — 99214 OFFICE O/P EST MOD 30 MIN: CPT | Mod: PBBFAC | Performed by: INTERNAL MEDICINE

## 2022-06-28 ASSESSMENT — SYSTEMIC LUPUS ERYTHEMATOSUS DISEASE ACTIVITY INDEX (SLEDAI): TOTAL_SCORE: 2

## 2022-06-28 NOTE — PATIENT INSTRUCTIONS
Decrease HCQ to 10 tablets /week.  Take 2 tablets every day M-F & none Sat, Sun    Next time you have fasting labs ask for a lipid panel.     Please get your pneumonia vaccines: you need both prevnar & pneumovax

## 2022-06-28 NOTE — PROGRESS NOTES
Subjective:     Patient ID: Babak Arevalo is a 46 y.o. female w SLE followed by nephrology & heme-onc  Chief Complaint: Disease Management               She reports no joint swelling. Associated symptoms include myalgias (intermittent). Pertinent negatives include no dysuria or fatigue. Headaches: in recent past.            46 yr old lady seen for the first time on 8/13/21 & last on 11/2/21.  She returns for f/u.  She is taking MMF 3 g/d,  200 mg bid & prednisone 2.5 mg/d.  HCQ prescribed by us, but she did not drop dose commensurate with weight as requested.  Steroids and MMF by nephrology.    She is doing very well SLE wise.    Denies AM stiffness, joint swelling, dysphagia, tight skin, oral ulcers, parotid gland swelling, dry eyes, dry mouth, pleurisy, pericarditis, photosensitivity, muscle weakness; fevers, family hx  Still has discolored scar around nasal areas.   Has been having Raynaud's since 2001  Has had patches of hair loss.  Has had one miscarriage ~ 1997/4 pregnancies.  Has 1 brother who's had PE & is on anticoagulation.       SLE dx 2001 followed by the late Dr. Kramer after she had a syncopal episode, was hospitalized and ultimately found to have SLE  She states that she also was found to have CVAs .  At that time had renal bx that confirmed lupus nephritis & was on CTX every month ~ 1yr +.  Has been on HCQ since 2001.  Has subsequently been on MMF x many years.  Has been on prednisone chronically. Lowest dose was 2.5 mg/d.  Was upped to 40 mg/d on 7/22/21 by nephrology when she was found to have renal insufficiency and proteinuria    Has had many CVA's in 2001 --was on coumadin  x few years,    In April was hospitalized at [facility] as she kept on having left sided head pain & MRI/MRA showed infarcts in the left temporal & occipital lobes, splenium of the corpus callosum and left PCA & posterior MCA. 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  There was w/u for emboli but  apparently not positive.            Current Outpatient Medications   Medication Sig Dispense Refill    aspirin (ECOTRIN) 81 MG EC tablet Take 81 mg by mouth once daily.      atorvastatin (LIPITOR) 40 MG tablet Take 1 tablet (40 mg total) by mouth once daily. 90 tablet 1    hydrOXYchloroQUINE (PLAQUENIL) 200 mg tablet Take 400 mg by mouth every Mon, Tues, Wed, Thurs, Fri.      mycophenolate (CELLCEPT) 500 mg Tab Take 3 tablets (1,500 mg total) by mouth 2 (two) times daily. 540 tablet 1    pantoprazole (PROTONIX) 40 MG tablet Take 1 tablet (40 mg total) by mouth once daily. 90 each 0    predniSONE (DELTASONE) 5 MG tablet Take 1 tablet (5 mg total) by mouth once daily. 90 tablet 0    vitamin D (VITAMIN D3) 1000 units Tab Take 1 tablet (1,000 Units total) by mouth once daily. 90 tablet 1     No current facility-administered medications for this visit.       Review of patient's allergies indicates:  No Known Allergies    Review of Systems   Constitutional: Negative for fatigue.        Usual weight is 125; had gone down to 90 lbs by hx. Now 117 lbs.   HENT: Negative.  Negative for hearing loss and postnasal drip.    Eyes: Negative.  Negative for photophobia and pain.   Respiratory: Negative.  Negative for cough and shortness of breath.    Cardiovascular: Negative.  Negative for chest pain and leg swelling.   Gastrointestinal: Negative for abdominal distention, nausea and vomiting.   Endocrine: Negative.  Negative for cold intolerance and heat intolerance.   Genitourinary: Positive for menstrual problem. Negative for difficulty urinating, dysuria and genital sores.        Scant menses; no hot flushes/night sweats.   Musculoskeletal: Positive for arthralgias, back pain and myalgias (intermittent). Negative for joint swelling, neck pain and neck stiffness.   Neurological: Positive for syncope (remotely). Headaches: in recent past.   Hematological: Negative for adenopathy. Does not bruise/bleed easily.  "  Psychiatric/Behavioral: Positive for sleep disturbance. Negative for dysphoric mood. The patient is not nervous/anxious.        Past Medical History:   Diagnosis Date    Disorder of kidney and ureter     Hypertension     SLE (systemic lupus erythematosus)     Stroke     Vertigo        Past Surgical History:   Procedure Laterality Date    RENAL BIOPSY         Family History   Problem Relation Age of Onset    Diabetes Mother     Cancer Sister         Breast    Stroke Neg Hx     Heart attack Neg Hx    M: 67yr old w arthritis x years.  F: in a wheel  4 sisters --one breast cancer  1 brother  Has 9 yr old w asthma  19 yr old son  Has a 22 yr old daughter who used to work at [employer] as a phlebotomist    Social History     Tobacco Use    Smoking status: Never Smoker    Smokeless tobacco: Never Used   Substance Use Topics    Alcohol use: No    Drug use: No       Objective:     /73   Pulse (!) 51   Temp 99.7 °F (37.6 °C)   Ht 5' 4" (1.626 m)   Wt 53.5 kg (117 lb 15.1 oz)   BMI 20.25 kg/m²   Accompanied by her son  Physical Exam   Constitutional: She is oriented to person, place, and time. No distress.   HENT:   Head: Normocephalic and atraumatic.   Mouth/Throat: Oropharynx is clear and moist. Mucous membranes are moist. No oropharyngeal exudate. Oropharynx is clear.   No facial rashes  Parotids not enlarged  No oral ulcers   Eyes: Pupils are equal, round, and reactive to light. Conjunctivae are normal. Right eye exhibits no discharge. Left eye exhibits no discharge. No scleral icterus.   Neck: No JVD present. No tracheal deviation present. No thyromegaly present.   Cardiovascular: Normal rate, regular rhythm and normal heart sounds. Exam reveals no gallop and no friction rub.   No murmur heard.  Pulmonary/Chest: Effort normal and breath sounds normal. No respiratory distress. She has no wheezes. She has no rales. She exhibits no tenderness.   Abdominal: Soft. Bowel sounds are normal. She exhibits no " distension and no mass. There is no splenomegaly or hepatomegaly. There is no abdominal tenderness. There is no rebound and no guarding.   Musculoskeletal:         General: No tenderness. Normal range of motion.      Cervical back: Neck supple.      Comments: Cspine FROM no tenderness  Tspine FROM no tenderness  Lspine FROM no tenderness.  TMJ: unremarkable  Shoulders: FROM; no synovitis;  Elbows: FROM; no synovitis; no tophi or nodules  Wrists: FROM; no synovitis;    MCPs: FROM; no synovitis; no metacarpalgia;  ok;  PIPs:FROM; no synovitis;   DIPs: FROM; no synovitis;   HIPS: FROM  Knees: FROM; no synovitis; no instability;  Ankles: FROM: no synovitis   Toes: ok;        Lymphadenopathy:     She has no cervical adenopathy.   Neurological: She is alert and oriented to person, place, and time. She has normal reflexes. No cranial nerve deficit. Gait normal.   Proximal and distal muscle strength 5/5.   Skin: Skin is warm and dry. No rash noted. She is not diaphoretic.   See photos;  DLE rashes bridge of nose, ear canals and posterior scalp.   Psychiatric: Mood, memory, affect and judgment normal.   Vitals reviewed.    6/17/22: CBC Hg 10.7; Ht 33; low indices; plt 144; CMP ok; dsDNA 1:640; C3 67; C4 17  4/13/22: UA 2+ pr; 1+ leuk; & RBC;  U pr/cne .69  10/28/21: ESR 50 (20); CRP 3.1; CBC Hg 10.1; Ht 33.3; low indices; BMP ok; UA 2+ OB; neg pro; U pr/cnne 0.39;  C3 79; C4 21;   9/24/21:CBC Hg 9.1; Ht 29.4; plts 142; WC 3.82;  BMP glu 65; phosh 4.7 (4.5); CPK 65; Ua 2+ pr; 1+ OB; U pr/cnne 0.42; ds DNA 1:320    8/13/21:  (36); CRP 1.0; RF neg; C3 72; C4 19; IgG 2506 (1600); IgA 979 (350);   7/19/21: CBC Hg 10.8; Ht 34.8; BMP cnne 1.7; GFR 42; P 4.7 (4.5); UA 3+ pr; U pr/cnne 1.28; PTH 53;   MARSHA 1>2560 Sp: dsDNA 1:80; +SSA+Sm; +RNP; C3 60; C4 23  6/10/21: haptoglobin 136; CARLEY neg;  5/6/21: LAC neg; aCLA neg/neg; beta 2 gly IgA 55 (20); rest ok;   Assessment:   SLE since 2001   +MARSHA+dsDNA+Sm+RNP   Ds DNA ab  chronically elevated   Mild leukopenia & thrombocytopenia   DLE/patchy alopecia/lupus nephritis     Lupus nephritis by hx & bx   Max proteinuria 1.28 g (7/921)   S/P CTX 6744-1868    mg bud   On MMF 1500 mg bid   On prednisone 2.5 mg/d.      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    S/P CKD 3   BLAYNE on chronic         Plan:   Patient will continue HCQ but will cut down to 10 tablets/wk. If she loses more weight will cut down to 9 tablets/week.  Needs eye exams periodically.  Needs Prevnar & pneumovax  Needs lipid panel     RTC 3-4 months or prn      CC:   Odalys Carbone MD (PCP at )

## 2022-07-03 NOTE — PROGRESS NOTES
REASON FOR CONSULT/CHIEF COMPLAIN: Acute renal failure, h/o Lupus nephritis    REFERRING PHYSICIAN: [facility] -Primary Care    HISTORY OF PRESENT ILLNESS: 46 y.o. female  patient was referred here for abnormal renal function. Denied chronic NSAID use, no known exposure to lithium, lead.   Early 2000's she was first time diagnosed with Lupus after a kidney biopsy at [facility], the results are not available now. She also had mild/small strokes at that time. She was give some infusions at that time like every two weeks (she thinks its cyclophosphamide). Since then she reportedly has been on Cellcept, Plaquenil and prednisone. She reports that her medications have not been changed. On reviewing the limited records it is unclear what dose of Cellcept she was taking, she has two cellcept pill bottles at home, one said 500 MG one tablet TID, other said 500 mg three tablets BID. She had BLAYNE with proteinuria in July 2021, wanted to get a kidney biopsy but the patient was hesitant. Gave her high dose prednisone starting July 2021 which improved the BLAYNE and proteinuria, Currently on minimal dose. Seen Rheum who recommended adding Belimumab but the patient did not want to.   Denied any new issues with urination including dysuria, hematuria, urgency, hesitancy, nocturia, incomplete voiding. Denied chest pain, nausea, vomiting, abd pain, nausea, vomiting, diarrhea, shortness of breath, pedal edema, Orthopnea, PND.  Home Blood pressures are not checked     ROS:  General: negative for chills, or fatigue  Respiratory: No cough, shortness of breath, or wheezing  Cardiovascular: No chest pain or dyspnea   Gastrointestinal: No abdominal pain, change in bowel habits  Genito-Urinary: No dysuria, trouble voiding, or hematuria  Neurological: No new focal weakness, no numbness  Dermatological: No rash or ulcers.    PAST MEDICAL HISTORY:  Past Medical History:   Diagnosis Date    Disorder of kidney and ureter      Hypertension     SLE (systemic lupus erythematosus)     Stroke     Vertigo        PAST SURGICAL HISTORY:  Past Surgical History:   Procedure Laterality Date    RENAL BIOPSY         FAMILY HISTORY:   Family History   Problem Relation Age of Onset    Diabetes Mother     Cancer Sister         Breast    Stroke Neg Hx     Heart attack Neg Hx        SOCIAL HISTORY:  Social History     Socioeconomic History    Marital status: Single   Tobacco Use    Smoking status: Never Smoker    Smokeless tobacco: Never Used   Substance and Sexual Activity    Alcohol use: No    Drug use: No       ALLERGIES:  Review of patient's allergies indicates:  No Known Allergies    MEDICATIONS:    Current Outpatient Medications:     aspirin (ECOTRIN) 81 MG EC tablet, Take 81 mg by mouth once daily., Disp: , Rfl:     atorvastatin (LIPITOR) 40 MG tablet, Take 1 tablet (40 mg total) by mouth once daily., Disp: 90 tablet, Rfl: 1    hydrOXYchloroQUINE (PLAQUENIL) 200 mg tablet, Take 400 mg by mouth every Mon, Tues, Wed, Thurs, Fri., Disp: , Rfl:     mycophenolate (CELLCEPT) 500 mg Tab, Take 3 tablets (1,500 mg total) by mouth 2 (two) times daily., Disp: 540 tablet, Rfl: 1    pantoprazole (PROTONIX) 40 MG tablet, Take 1 tablet (40 mg total) by mouth once daily., Disp: 90 each, Rfl: 0    predniSONE (DELTASONE) 5 MG tablet, Take 1 tablet (5 mg total) by mouth once daily., Disp: 90 tablet, Rfl: 0    vitamin D (VITAMIN D3) 1000 units Tab, Take 1 tablet (1,000 Units total) by mouth once daily., Disp: 90 tablet, Rfl: 1   Medication List with Changes/Refills   Current Medications    ASPIRIN (ECOTRIN) 81 MG EC TABLET    Take 81 mg by mouth once daily.    ATORVASTATIN (LIPITOR) 40 MG TABLET    Take 1 tablet (40 mg total) by mouth once daily.    HYDROXYCHLOROQUINE (PLAQUENIL) 200 MG TABLET    Take 400 mg by mouth every Mon, Tues, Wed, Thurs, Fri.    MYCOPHENOLATE (CELLCEPT) 500 MG TAB    Take 3 tablets (1,500 mg total) by mouth 2 (two) times  "daily.    PANTOPRAZOLE (PROTONIX) 40 MG TABLET    Take 1 tablet (40 mg total) by mouth once daily.    PREDNISONE (DELTASONE) 5 MG TABLET    Take 1 tablet (5 mg total) by mouth once daily.    VITAMIN D (VITAMIN D3) 1000 UNITS TAB    Take 1 tablet (1,000 Units total) by mouth once daily.        PHYSICAL EXAM:  /75   Pulse 91   Ht 5' 3" (1.6 m)   Wt 54.7 kg (120 lb 9.5 oz)   BMI 21.36 kg/m²     General: No distress, No fever or chills  Neck: No adenopathy,no carotid bruit,no JVD  Lungs:Clear to auscultation bilaterally, No Crackles  Heart: Regular rate and rhythm, no murmur, gallops or rubs  Abdomen: Soft, no tenderness, bowel sounds normal  Extremities: Atraumatic, no edema in LE  Skin: Turgor normal. Rash on face  Neurologic: No focal weakness, oriented.  Dialysis Access: Non applicable        LABS:  Lab Results   Component Value Date    HGB 10.7 (L) 06/17/2022        Lab Results   Component Value Date    CREATININE 0.9 06/17/2022       Prot/Creat Ratio, Urine   Date Value Ref Range Status   04/13/2022 0.69 (H) 0.00 - 0.20 Final   03/08/2022 0.38 (H) 0.00 - 0.20 Final   02/07/2022 0.43 (H) 0.00 - 0.20 Final   02/07/2022 0.43 (H) 0.00 - 0.20 Final       Lab Results   Component Value Date     06/17/2022    K 4.8 06/17/2022    CO2 21 (L) 06/17/2022       last PTH   Lab Results   Component Value Date    PTH 47.4 02/07/2022    CALCIUM 9.5 06/17/2022    PHOS 4.0 06/17/2022       Lab Results   Component Value Date    HGBA1C 5.5 04/02/2021       Lab Results   Component Value Date    LDLCALC 126.8 04/02/2021         Creatinine, Urine   Date Value Ref Range Status   04/13/2022 132.0 15.0 - 325.0 mg/dL Final   03/08/2022 192.0 15.0 - 325.0 mg/dL Final   02/07/2022 54.0 15.0 - 325.0 mg/dL Final   02/07/2022 54.0 15.0 - 325.0 mg/dL Final       Protein, Urine Random   Date Value Ref Range Status   04/13/2022 91 (H) 0 - 15 mg/dL Final   03/08/2022 72 (H) 0 - 15 mg/dL Final   02/07/2022 23 (H) 0 - 15 mg/dL Final "   02/07/2022 23 (H) 0 - 15 mg/dL Final       Prot/Creat Ratio, Urine   Date Value Ref Range Status   04/13/2022 0.69 (H) 0.00 - 0.20 Final   03/08/2022 0.38 (H) 0.00 - 0.20 Final   02/07/2022 0.43 (H) 0.00 - 0.20 Final   02/07/2022 0.43 (H) 0.00 - 0.20 Final         ASSESSMENT:    1) Lupus Nephritis unknown class partial remission  2) Acute Kidney Injury on back ground of Lupus Nephritis  3) Systemic Lupus.  3) Hyperkalemia   Pt was reportedly seeing Dr. Adamson in 2018 and it is unclear if she is seeing any nephrologist since then. Last rheumatology clinic notes [facility] stated that patient is on Cellcept 3 Gr/day, Plaquenil 200 mg BID and prednisone 2.5 mg daily. Due to proteinuria there was discussion about kidney biopsy too. There was also a question about patients medication compliance in the past due to patients forgetfulness. Her ultrasound 4/2021 showed 9.8 and 9.2 cm kidneys.   During her first clinic visit (around April 2021) her creatinine came back at 1.8 which is above her baseline of less than one and hyperkalemia. Discontinued her lisinopril and her BLAYNE and hyperkalemia improved making sublinical hypotension likely etiology for this. Urine microscopy and urine analysis did not show significant RBC, protein creatinine ratio did not show significant proteinuria.   Next clinic visit in July 2021 showed elevated creatinine and proteinuria. Started on prednisone which lead to improvement in kidney function and proteinuria. Requested patient to get a kidney biopsy, however she was hesitant. Re-discussed about Benlysta and kidney biopsy patient did not want them at this time. She also wants to get the prednisone dose decrease, I worry that her chance of relapse is high and discussed the same. Also since July 2021 to April 2022 (10 months duration) she only filled 6 months worth of medication.   Acid Base, Hemoglobin, Bone Mineral in acceptable range.    - Continue Cellcept 3 Gr/day, plaquenil  200 mg BID (Mon-Fri).  - Prednisone at 5 mg.  - Continue to hold lisinopril for now.  -(Discussed with daughter in previous visit that patients lupus is not well controlled and we need to increase or change treatment to see if we can get it under remission. Discussed about other IS meds. Patient continues to decline Biopsy and Benlysta/Volcosporin. Explained that if we dont try to get it under control we might have early long term consequences of lupus.)  - Inform me if the blood pressure starts going above 130 mmhg top number. Today's elevation is being blamed on salt intake. Requested to cut down salt intake.  - Avoid NSAIDs intake  - Re-counselled about being compliant with medications      RTC in 2 months    Diagnosis and plan of care explained to the patient and daughter at length.  Verbalized understanding. Answered all questions.  Thanks for allowing me to participate in the care of this patient.     1:47 PM    Franko Faria MD  Nephrology & Critical Care    25 minutes of total time spent on the encounter, which includes face to face time and non-face to face time preparing to see the patient (eg, review of tests), Obtaining and/or reviewing separately obtained history, documenting clinical information in the electronic or other health record, independently interpreting results (not separately reported) and communicating results to the patient/family/caregiver, or Care coordination (not separately reported).

## 2022-08-19 ENCOUNTER — LAB VISIT (OUTPATIENT)
Dept: LAB | Facility: HOSPITAL | Age: 47
End: 2022-08-19
Attending: INTERNAL MEDICINE
Payer: MEDICARE

## 2022-08-19 DIAGNOSIS — M32.14 LUPUS NEPHRITIS: ICD-10-CM

## 2022-08-19 LAB
ALBUMIN SERPL BCP-MCNC: 3.3 G/DL (ref 3.5–5.2)
ANION GAP SERPL CALC-SCNC: 9 MMOL/L (ref 8–16)
BASOPHILS # BLD AUTO: 0.01 K/UL (ref 0–0.2)
BASOPHILS NFR BLD: 0.2 % (ref 0–1.9)
BUN SERPL-MCNC: 10 MG/DL (ref 6–20)
CALCIUM SERPL-MCNC: 9.2 MG/DL (ref 8.7–10.5)
CHLORIDE SERPL-SCNC: 105 MMOL/L (ref 95–110)
CO2 SERPL-SCNC: 27 MMOL/L (ref 23–29)
CREAT SERPL-MCNC: 0.8 MG/DL (ref 0.5–1.4)
DIFFERENTIAL METHOD: ABNORMAL
EOSINOPHIL # BLD AUTO: 0 K/UL (ref 0–0.5)
EOSINOPHIL NFR BLD: 0 % (ref 0–8)
ERYTHROCYTE [DISTWIDTH] IN BLOOD BY AUTOMATED COUNT: 15.7 % (ref 11.5–14.5)
EST. GFR  (NO RACE VARIABLE): >60 ML/MIN/1.73 M^2
GLUCOSE SERPL-MCNC: 88 MG/DL (ref 70–110)
HCT VFR BLD AUTO: 30.4 % (ref 37–48.5)
HGB BLD-MCNC: 9.4 G/DL (ref 12–16)
IMM GRANULOCYTES # BLD AUTO: 0.01 K/UL (ref 0–0.04)
IMM GRANULOCYTES NFR BLD AUTO: 0.2 % (ref 0–0.5)
LYMPHOCYTES # BLD AUTO: 1.4 K/UL (ref 1–4.8)
LYMPHOCYTES NFR BLD: 27.2 % (ref 18–48)
MCH RBC QN AUTO: 25.8 PG (ref 27–31)
MCHC RBC AUTO-ENTMCNC: 30.9 G/DL (ref 32–36)
MCV RBC AUTO: 83 FL (ref 82–98)
MONOCYTES # BLD AUTO: 0.3 K/UL (ref 0.3–1)
MONOCYTES NFR BLD: 4.9 % (ref 4–15)
NEUTROPHILS # BLD AUTO: 3.4 K/UL (ref 1.8–7.7)
NEUTROPHILS NFR BLD: 67.5 % (ref 38–73)
NRBC BLD-RTO: 0 /100 WBC
PHOSPHATE SERPL-MCNC: 4.2 MG/DL (ref 2.7–4.5)
PLATELET # BLD AUTO: 170 K/UL (ref 150–450)
PMV BLD AUTO: 10.1 FL (ref 9.2–12.9)
POTASSIUM SERPL-SCNC: 4.1 MMOL/L (ref 3.5–5.1)
RBC # BLD AUTO: 3.65 M/UL (ref 4–5.4)
SODIUM SERPL-SCNC: 141 MMOL/L (ref 136–145)
WBC # BLD AUTO: 5.07 K/UL (ref 3.9–12.7)

## 2022-08-19 PROCEDURE — 80069 RENAL FUNCTION PANEL: CPT | Performed by: INTERNAL MEDICINE

## 2022-08-19 PROCEDURE — 85025 COMPLETE CBC W/AUTO DIFF WBC: CPT | Performed by: INTERNAL MEDICINE

## 2022-08-19 PROCEDURE — 36415 COLL VENOUS BLD VENIPUNCTURE: CPT | Performed by: INTERNAL MEDICINE

## 2022-08-19 PROCEDURE — 86225 DNA ANTIBODY NATIVE: CPT | Mod: 59 | Performed by: INTERNAL MEDICINE

## 2022-08-19 PROCEDURE — 86225 DNA ANTIBODY NATIVE: CPT | Performed by: INTERNAL MEDICINE

## 2022-08-22 ENCOUNTER — OFFICE VISIT (OUTPATIENT)
Dept: NEPHROLOGY | Facility: CLINIC | Age: 47
End: 2022-08-22
Payer: MEDICARE

## 2022-08-22 VITALS
OXYGEN SATURATION: 18 % | BODY MASS INDEX: 21.76 KG/M2 | WEIGHT: 127.44 LBS | HEART RATE: 98 BPM | HEIGHT: 64 IN | SYSTOLIC BLOOD PRESSURE: 110 MMHG | DIASTOLIC BLOOD PRESSURE: 70 MMHG

## 2022-08-22 DIAGNOSIS — N18.2 CKD STAGE G2/A2, GFR 60-89 AND ALBUMIN CREATININE RATIO 30-299 MG/G: ICD-10-CM

## 2022-08-22 DIAGNOSIS — M32.9 SYSTEMIC LUPUS ERYTHEMATOSUS, UNSPECIFIED SLE TYPE, UNSPECIFIED ORGAN INVOLVEMENT STATUS: ICD-10-CM

## 2022-08-22 DIAGNOSIS — M32.14 LUPUS NEPHRITIS: Primary | ICD-10-CM

## 2022-08-22 PROCEDURE — 99215 PR OFFICE/OUTPT VISIT, EST, LEVL V, 40-54 MIN: ICD-10-PCS | Mod: S$PBB,,, | Performed by: INTERNAL MEDICINE

## 2022-08-22 PROCEDURE — 99215 OFFICE O/P EST HI 40 MIN: CPT | Mod: S$PBB,,, | Performed by: INTERNAL MEDICINE

## 2022-08-22 PROCEDURE — 99999 PR PBB SHADOW E&M-EST. PATIENT-LVL III: CPT | Mod: PBBFAC,,, | Performed by: INTERNAL MEDICINE

## 2022-08-22 PROCEDURE — 99999 PR PBB SHADOW E&M-EST. PATIENT-LVL III: ICD-10-PCS | Mod: PBBFAC,,, | Performed by: INTERNAL MEDICINE

## 2022-08-22 PROCEDURE — 99213 OFFICE O/P EST LOW 20 MIN: CPT | Mod: PBBFAC | Performed by: INTERNAL MEDICINE

## 2022-08-23 LAB
DNA TITER: NORMAL
DSDNA AB SER-ACNC: POSITIVE [IU]/ML

## 2022-08-28 NOTE — PROGRESS NOTES
REASON FOR CONSULT/CHIEF COMPLAIN: Acute renal failure, h/o Lupus nephritis    REFERRING PHYSICIAN: [facility] -Primary Care    HISTORY OF PRESENT ILLNESS: 47 y.o. female  patient was referred here for abnormal renal function. Denied chronic NSAID use, no known exposure to lithium, lead.   Early 2000's she was first time diagnosed with Lupus after a kidney biopsy at [facility], the results are not available now. She also had mild/small strokes at that time. She was give some infusions at that time like every two weeks (she thinks its cyclophosphamide). Since then she reportedly has been on Cellcept, Plaquenil and prednisone. She reports that her medications have not been changed. On reviewing the limited records it is unclear what dose of Cellcept she was taking, she has two cellcept pill bottles at home, one said 500 MG one tablet TID, other said 500 mg three tablets BID. She had BLAYNE with proteinuria in July 2021, wanted to get a kidney biopsy but the patient was hesitant. Gave her high dose prednisone starting July 2021 which improved the BLAYNE and proteinuria, Currently on minimal dose. Seen Rheum who recommended adding Belimumab but the patient did not want to.   Denied any new issues with urination including dysuria, hematuria, urgency, hesitancy, nocturia, incomplete voiding. Denied chest pain, nausea, vomiting, abd pain, nausea, vomiting, diarrhea, shortness of breath, pedal edema, Orthopnea, PND.  Home Blood pressures are not checked     ROS:  General: negative for chills, or fatigue  Respiratory: No cough, shortness of breath, or wheezing  Cardiovascular: No chest pain or dyspnea   Gastrointestinal: No abdominal pain, change in bowel habits  Genito-Urinary: No dysuria, trouble voiding, or hematuria  Neurological: No new focal weakness, no numbness  Dermatological: No rash or ulcers.    PAST MEDICAL HISTORY:  Past Medical History:   Diagnosis Date    Disorder of kidney and ureter      Hypertension     SLE (systemic lupus erythematosus)     Stroke     Vertigo        PAST SURGICAL HISTORY:  Past Surgical History:   Procedure Laterality Date    RENAL BIOPSY         FAMILY HISTORY:   Family History   Problem Relation Age of Onset    Diabetes Mother     Cancer Sister         Breast    Stroke Neg Hx     Heart attack Neg Hx        SOCIAL HISTORY:  Social History     Socioeconomic History    Marital status: Single   Tobacco Use    Smoking status: Never    Smokeless tobacco: Never   Substance and Sexual Activity    Alcohol use: No    Drug use: No       ALLERGIES:  Review of patient's allergies indicates:  No Known Allergies    MEDICATIONS:    Current Outpatient Medications:     aspirin (ECOTRIN) 81 MG EC tablet, Take 81 mg by mouth once daily., Disp: , Rfl:     atorvastatin (LIPITOR) 40 MG tablet, TAKE 1 TABLET BY MOUTH EVERY DAY, Disp: 90 tablet, Rfl: 1    hydrOXYchloroQUINE (PLAQUENIL) 200 mg tablet, Take 2 tablets (400 mg total) by mouth every Mon, Tues, Wed, Thurs, Fri., Disp: 180 tablet, Rfl: 1    mycophenolate (CELLCEPT) 500 mg Tab, Take 3 tablets (1,500 mg total) by mouth 2 (two) times daily., Disp: 540 tablet, Rfl: 1    pantoprazole (PROTONIX) 40 MG tablet, TAKE 1 TABLET BY MOUTH EVERY DAY, Disp: 90 tablet, Rfl: 0    predniSONE (DELTASONE) 5 MG tablet, Take 1 tablet (5 mg total) by mouth once daily., Disp: 90 tablet, Rfl: 0    vitamin D (VITAMIN D3) 1000 units Tab, Take 1 tablet (1,000 Units total) by mouth once daily., Disp: 90 tablet, Rfl: 1   Medication List with Changes/Refills   Current Medications    ASPIRIN (ECOTRIN) 81 MG EC TABLET    Take 81 mg by mouth once daily.    ATORVASTATIN (LIPITOR) 40 MG TABLET    TAKE 1 TABLET BY MOUTH EVERY DAY    HYDROXYCHLOROQUINE (PLAQUENIL) 200 MG TABLET    Take 2 tablets (400 mg total) by mouth every Mon, Tues, Wed, Thurs, Fri.    MYCOPHENOLATE (CELLCEPT) 500 MG TAB    Take 3 tablets (1,500 mg total) by mouth 2 (two) times daily.    PANTOPRAZOLE (PROTONIX)  "40 MG TABLET    TAKE 1 TABLET BY MOUTH EVERY DAY    PREDNISONE (DELTASONE) 5 MG TABLET    Take 1 tablet (5 mg total) by mouth once daily.    VITAMIN D (VITAMIN D3) 1000 UNITS TAB    Take 1 tablet (1,000 Units total) by mouth once daily.        PHYSICAL EXAM:  /70   Pulse 98   Ht 5' 4" (1.626 m)   Wt 57.8 kg (127 lb 6.8 oz)   SpO2 (!) 18%   BMI 21.87 kg/m²     General: No distress, No fever or chills  Neck: No adenopathy,no carotid bruit,no JVD  Lungs:Clear to auscultation bilaterally, No Crackles  Heart: Regular rate and rhythm, no murmur, gallops or rubs  Abdomen: Soft, no tenderness, bowel sounds normal  Extremities: Atraumatic, no edema in LE  Skin: Turgor normal. Rash on face  Neurologic: No focal weakness, oriented.  Dialysis Access: Non applicable        LABS:  Lab Results   Component Value Date    HGB 9.4 (L) 08/19/2022        Lab Results   Component Value Date    CREATININE 0.8 08/19/2022       Prot/Creat Ratio, Urine   Date Value Ref Range Status   08/19/2022 0.55 (H) 0.00 - 0.20 Final   04/13/2022 0.69 (H) 0.00 - 0.20 Final   03/08/2022 0.38 (H) 0.00 - 0.20 Final       Lab Results   Component Value Date     08/19/2022    K 4.1 08/19/2022    CO2 27 08/19/2022       last PTH   Lab Results   Component Value Date    PTH 47.4 02/07/2022    CALCIUM 9.2 08/19/2022    PHOS 4.2 08/19/2022       Lab Results   Component Value Date    HGBA1C 5.5 04/02/2021       Lab Results   Component Value Date    LDLCALC 126.8 04/02/2021         Creatinine, Urine   Date Value Ref Range Status   08/19/2022 136.0 15.0 - 325.0 mg/dL Final   04/13/2022 132.0 15.0 - 325.0 mg/dL Final   03/08/2022 192.0 15.0 - 325.0 mg/dL Final       Protein, Urine Random   Date Value Ref Range Status   08/19/2022 75 (H) 0 - 15 mg/dL Final   04/13/2022 91 (H) 0 - 15 mg/dL Final   03/08/2022 72 (H) 0 - 15 mg/dL Final       Prot/Creat Ratio, Urine   Date Value Ref Range Status   08/19/2022 0.55 (H) 0.00 - 0.20 Final   04/13/2022 0.69 (H) " 0.00 - 0.20 Final   03/08/2022 0.38 (H) 0.00 - 0.20 Final         ASSESSMENT:    1) Lupus Nephritis unknown class partial remission  2) Acute Kidney Injury on back ground of Lupus Nephritis  3) Systemic Lupus.  3) Hyperkalemia   Pt was reportedly seeing Dr. Adamson in 2018 and it is unclear if she is seeing any nephrologist since then. [facility] that patient is on Cellcept 3 Gr/day, Plaquenil 200 mg BID and prednisone 2.5 mg daily. Due to proteinuria there was discussion about kidney biopsy too. There was also a question about patients medication compliance in the past due to patients forgetfulness. Her ultrasound 4/2021 showed 9.8 and 9.2 cm kidneys.   During her first clinic visit (around April 2021) her creatinine came back at 1.8 which is above her baseline of less than one and hyperkalemia. Discontinued her lisinopril and her BLAYNE and hyperkalemia improved making sublinical hypotension likely etiology for this. Urine microscopy and urine analysis did not show significant RBC, protein creatinine ratio did not show significant proteinuria.   Next clinic visit in July 2021 showed elevated creatinine and proteinuria. Started on prednisone which lead to improvement in kidney function and proteinuria. Requested patient to get a kidney biopsy, however she was hesitant. Re-discussed about Benlysta and kidney biopsy patient did not want them at this time. She also wants to get the prednisone dose decrease, I worry that her chance of relapse is high and discussed the same. Also since July 2021 to April 2022 (10 months duration) she only filled 6 months worth of medication.   Acid Base, Hemoglobin, Bone Mineral in acceptable range.    - Continue Cellcept 3 Gr/day, plaquenil 200 mg BID (Mon-Fri). (Worry about consistent intake/compliance)  - Prednisone at 5 mg every other day  - Continue to hold lisinopril for now.  -(Discussed with daughter in previous visit that patients  lupus is not well controlled and we need to increase or change treatment to see if we can get it under remission. Discussed about other IS meds. Patient continues to decline Biopsy and Benlysta/Volcosporin. Explained that if we dont try to get it under control we might have early long term consequences of lupus.)  - Inform me if the blood pressure starts going above 130 mmhg top number. Today's elevation is being blamed on salt intake. Requested to cut down salt intake.  - Avoid NSAIDs intake  - Re-counselled about being compliant with medications      RTC in 2 months    Diagnosis and plan of care explained to the patient and daughter at length.  Verbalized understanding. Answered all questions.  Thanks for allowing me to participate in the care of this patient.     1:47 PM    Franko Faria MD  Nephrology & Critical Care    25 minutes of total time spent on the encounter, which includes face to face time and non-face to face time preparing to see the patient (eg, review of tests), Obtaining and/or reviewing separately obtained history, documenting clinical information in the electronic or other health record, independently interpreting results (not separately reported) and communicating results to the patient/family/caregiver, or Care coordination (not separately reported).

## 2022-10-12 ENCOUNTER — OFFICE VISIT (OUTPATIENT)
Dept: RHEUMATOLOGY | Facility: CLINIC | Age: 47
End: 2022-10-12
Payer: MEDICARE

## 2022-10-12 VITALS
WEIGHT: 129.19 LBS | HEART RATE: 91 BPM | HEIGHT: 64 IN | DIASTOLIC BLOOD PRESSURE: 77 MMHG | SYSTOLIC BLOOD PRESSURE: 114 MMHG | BODY MASS INDEX: 22.06 KG/M2

## 2022-10-12 DIAGNOSIS — M32.9 SYSTEMIC LUPUS ERYTHEMATOSUS, UNSPECIFIED SLE TYPE, UNSPECIFIED ORGAN INVOLVEMENT STATUS: Primary | ICD-10-CM

## 2022-10-12 DIAGNOSIS — Z79.52 LONG TERM (CURRENT) USE OF SYSTEMIC STEROIDS: ICD-10-CM

## 2022-10-12 DIAGNOSIS — Z79.899 LONG-TERM USE OF HYDROXYCHLOROQUINE: ICD-10-CM

## 2022-10-12 DIAGNOSIS — Z79.60 LONG-TERM USE OF IMMUNOSUPPRESSANT MEDICATION: ICD-10-CM

## 2022-10-12 PROCEDURE — 99214 OFFICE O/P EST MOD 30 MIN: CPT | Mod: S$PBB,,, | Performed by: INTERNAL MEDICINE

## 2022-10-12 PROCEDURE — 99999 PR PBB SHADOW E&M-EST. PATIENT-LVL III: ICD-10-PCS | Mod: PBBFAC,,, | Performed by: INTERNAL MEDICINE

## 2022-10-12 PROCEDURE — 99213 OFFICE O/P EST LOW 20 MIN: CPT | Mod: PBBFAC | Performed by: INTERNAL MEDICINE

## 2022-10-12 PROCEDURE — 99999 PR PBB SHADOW E&M-EST. PATIENT-LVL III: CPT | Mod: PBBFAC,,, | Performed by: INTERNAL MEDICINE

## 2022-10-12 PROCEDURE — 99214 PR OFFICE/OUTPT VISIT, EST, LEVL IV, 30-39 MIN: ICD-10-PCS | Mod: S$PBB,,, | Performed by: INTERNAL MEDICINE

## 2022-10-12 ASSESSMENT — ROUTINE ASSESSMENT OF PATIENT INDEX DATA (RAPID3)
FATIGUE SCORE: 0.5
TOTAL RAPID3 SCORE: 0.17
PATIENT GLOBAL ASSESSMENT SCORE: 0
PSYCHOLOGICAL DISTRESS SCORE: 0
MDHAQ FUNCTION SCORE: 0
PAIN SCORE: 0.5
AM STIFFNESS SCORE: 0, NO

## 2022-10-12 ASSESSMENT — SYSTEMIC LUPUS ERYTHEMATOSUS DISEASE ACTIVITY INDEX (SLEDAI): TOTAL_SCORE: 2

## 2022-10-12 NOTE — PATIENT INSTRUCTIONS
Check with Dr. Faria about resuming your statin: atorvastatin.    Please get your eyes examined as you are on hydroxychloroquine. .

## 2022-10-12 NOTE — PROGRESS NOTES
Subjective:     Patient ID: Babak Arevalo is a 47 y.o. female w SLE followed by nephrology & heme-onc  Chief Complaint: No chief complaint on file.               She reports no joint swelling. Associated symptoms include myalgias (intermittent). Pertinent negatives include no dysuria or fatigue.          47yr old lady seen for the first time on 8/13/21 & last on 6/28/22.  She returns for f/u.  She is still taking MMF 3 g/d, HCQ 10 tablets/wk & prednisone 5 mg/d.  She is followed by nephrology, Dr. Faria for proteinuria due to lupus nephritis (renal bx 2001) and is MMF & prednisone are prescribed by Dr. GARNER.  She continue wo complaints SLE wise.    Denies AM stiffness, joint swelling, dysphagia, tight skin, oral ulcers, parotid gland swelling, dry eyes, dry mouth, pleurisy, pericarditis, photosensitivity, muscle weakness; fevers, family hx  Still has discolored scar around nasal areas.   Has been having Raynaud's since 2001  Has had patches of hair loss.  Has had one miscarriage ~ 1997/4 pregnancies.  Has 1 brother who's had PE & is on anticoagulation.     Labs are with persisting dsDNA antibodies and hypochromic microcytic anemia    She is no longer taking atorvastatin.      SLE dx 2001 followed by the late Dr. Kramer after she had a syncopal episode, was hospitalized and ultimately found to have SLE  She states that she also was found to have CVAs .  At that time had renal bx that confirmed lupus nephritis & was on CTX every month ~ 1yr +.  Has been on HCQ since 2001.  Has subsequently been on MMF x many years.  Has been on prednisone chronically. Lowest dose was 2.5 mg/d.  Was upped to 40 mg/d on 7/22/21 by nephrology when she was found to have renal insufficiency and proteinuria    Has had many CVA's in 2001 --was on coumadin  x few years,    In April was hospitalized at [facility] as she kept on having left sided head pain & MRI/MRA showed infarcts in the left temporal & occipital lobes, splenium of the corpus  callosum and left PCA & posterior MCA. 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  There was w/u for emboli but apparently not positive.          Current Outpatient Medications   Medication Sig Dispense Refill    aspirin (ECOTRIN) 81 MG EC tablet Take 81 mg by mouth once daily.      hydrOXYchloroQUINE (PLAQUENIL) 200 mg tablet Take 2 tablets (400 mg total) by mouth every Mon, Tues, Wed, Thurs, Fri. 180 tablet 1    mycophenolate (CELLCEPT) 500 mg Tab Take 3 tablets (1,500 mg total) by mouth 2 (two) times daily. 540 tablet 1    pantoprazole (PROTONIX) 40 MG tablet TAKE 1 TABLET BY MOUTH EVERY DAY 90 tablet 0    predniSONE (DELTASONE) 5 MG tablet Take 1 tablet (5 mg total) by mouth once daily. 90 tablet 0     No current facility-administered medications for this visit.           Review of patient's allergies indicates:  No Known Allergies    Review of Systems   Constitutional:  Negative for fatigue.        Usual weight is 125; had gone down to 90 lbs by hx. Now 117 lbs.   HENT: Negative.  Negative for hearing loss and postnasal drip.    Eyes: Negative.  Negative for photophobia and pain.   Respiratory: Negative.  Negative for cough and shortness of breath.    Cardiovascular: Negative.  Negative for chest pain and leg swelling.   Gastrointestinal:  Negative for abdominal distention, nausea and vomiting.   Endocrine: Negative.  Negative for cold intolerance and heat intolerance.   Genitourinary:  Positive for menstrual problem. Negative for difficulty urinating, dysuria and genital sores.        Scant menses; no hot flushes/night sweats.   Musculoskeletal:  Positive for myalgias (intermittent). Negative for arthralgias, back pain, joint swelling, neck pain and neck stiffness.   Neurological:  Positive for syncope (remotely).   Hematological:  Negative for adenopathy. Does not bruise/bleed easily.   Psychiatric/Behavioral:  Negative for dysphoric mood and sleep disturbance. The patient is not nervous/anxious.  "     Past Medical History:   Diagnosis Date    Disorder of kidney and ureter     Hypertension     SLE (systemic lupus erythematosus)     Stroke     Vertigo        Past Surgical History:   Procedure Laterality Date    RENAL BIOPSY         Family History   Problem Relation Age of Onset    Diabetes Mother     Cancer Sister         Breast    Stroke Neg Hx     Heart attack Neg Hx    M: 67yr old w arthritis x years.  F: in a wheel  4 sisters --one breast cancer  1 brother  Has 9 yr old w asthma  19 yr old son  Has a 22 yr old daughter who used to work at [employer] as a phlebotomist    Social History     Tobacco Use    Smoking status: Never    Smokeless tobacco: Never   Substance Use Topics    Alcohol use: No    Drug use: No       Objective:   /77   Pulse 91   Ht 5' 4" (1.626 m)   Wt 58.6 kg (129 lb 3 oz)   BMI 22.18 kg/m²   Accompanied by her 10 yr old son.    Physical Exam   Constitutional: She is oriented to person, place, and time. No distress.   HENT:   Head: Normocephalic and atraumatic.   Mouth/Throat: Oropharynx is clear and moist. Mucous membranes are moist. No oropharyngeal exudate. Oropharynx is clear.   No facial rashes  Parotids not enlarged  No oral ulcers   Eyes: Pupils are equal, round, and reactive to light. Conjunctivae are normal. Right eye exhibits no discharge. Left eye exhibits no discharge. No scleral icterus.   Neck: No JVD present. No tracheal deviation present. No thyromegaly present.   Cardiovascular: Normal rate, regular rhythm and normal heart sounds. Exam reveals no gallop and no friction rub.   No murmur heard.  Pulmonary/Chest: Effort normal and breath sounds normal. No respiratory distress. She has no wheezes. She has no rales. She exhibits no tenderness.   Abdominal: Soft. Bowel sounds are normal. She exhibits no distension and no mass. There is no splenomegaly or hepatomegaly. There is no abdominal tenderness. There is no rebound and no guarding.   Musculoskeletal:         General: No " tenderness. Normal range of motion.      Cervical back: Neck supple.      Comments: Cspine FROM no tenderness  Tspine FROM no tenderness  Lspine FROM no tenderness.  TMJ: unremarkable  Shoulders: FROM; no synovitis;  Elbows: FROM; no synovitis; no tophi or nodules  Wrists: FROM; no synovitis;    MCPs: FROM; no synovitis; no metacarpalgia;  ok;  PIPs:FROM; no synovitis;   DIPs: FROM; no synovitis;   HIPS: FROM  Knees: FROM; no synovitis; no instability;  Ankles: FROM: no synovitis   Toes: ok;        Lymphadenopathy:     She has no cervical adenopathy.   Neurological: She is alert and oriented to person, place, and time. She has normal reflexes. No cranial nerve deficit. Gait normal.   Proximal and distal muscle strength 5/5.   Skin: Skin is warm and dry. No rash noted. She is not diaphoretic.   See photos;  DLE rashes bridge of nose, ear canals and posterior scalp.   Psychiatric: Mood, memory, affect and judgment normal.   Vitals reviewed.    8/19/22: CBC Hg 9.7; Ht 30.4; CMP Alb 3.3; UA 1+ pr; 1+ bl; 7 RBC; U pr/cnne -.55; dsDNA 1:640;   6/17/22: CBC Hg 10.7; Ht 33; low indices; plt 144; CMP ok; dsDNA 1:640; C3 67; C4 17  4/13/22: UA 2+ pr; 1+ leuk; & RBC;  U pr/cne .69  10/28/21: ESR 50 (20); CRP 3.1; CBC Hg 10.1; Ht 33.3; low indices; BMP ok; UA 2+ OB; neg pro; U pr/cnne 0.39;  C3 79; C4 21;   9/24/21:CBC Hg 9.1; Ht 29.4; plts 142; WC 3.82;  BMP glu 65; phosh 4.7 (4.5); CPK 65; Ua 2+ pr; 1+ OB; U pr/cnne 0.42; ds DNA 1:320    8/13/21:  (36); CRP 1.0; RF neg; C3 72; C4 19; IgG 2506 (1600); IgA 979 (350);   7/19/21: CBC Hg 10.8; Ht 34.8; BMP cnne 1.7; GFR 42; P 4.7 (4.5); UA 3+ pr; U pr/cnne 1.28; PTH 53;   MARSHA 1>2560 Sp: dsDNA 1:80; +SSA+Sm; +RNP; C3 60; C4 23  6/10/21: haptoglobin 136; CARLEY neg;  5/6/21: LAC neg; aCLA neg/neg; beta 2 gly IgA 55 (20); rest ok;   Assessment:   SLE since 2001   +MARSHA+dsDNA+Sm+RNP   Ds DNA ab chronically elevated   Mild leukopenia & thrombocytopenia   DLE/patchy  alopecia/lupus nephritis     Lupus nephritis by hx & bx   Max proteinuria 1.28 g (7/921)   S/P CTX 1870-3605    mg bud   On MMF 1500 mg bid   On prednisone 2.5 mg/d.      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    S/P CKD 3   BLAYNE on chronic         Plan:   Patient will continue HCQ 10 tablets/wk..  Needs eye exam--discussed again.  Prevnar today.  Labs put on for 10/24/22    RTC 3-4 months or prn      CC:   Odalys Carbone MD (PCP at )

## 2022-10-24 ENCOUNTER — OFFICE VISIT (OUTPATIENT)
Dept: NEPHROLOGY | Facility: CLINIC | Age: 47
End: 2022-10-24
Payer: MEDICARE

## 2022-10-24 VITALS — WEIGHT: 123 LBS | SYSTOLIC BLOOD PRESSURE: 118 MMHG | BODY MASS INDEX: 21.12 KG/M2 | DIASTOLIC BLOOD PRESSURE: 80 MMHG

## 2022-10-24 DIAGNOSIS — D84.821 IMMUNOSUPPRESSION DUE TO DRUG THERAPY: ICD-10-CM

## 2022-10-24 DIAGNOSIS — M32.9 SYSTEMIC LUPUS ERYTHEMATOSUS, UNSPECIFIED SLE TYPE, UNSPECIFIED ORGAN INVOLVEMENT STATUS: ICD-10-CM

## 2022-10-24 DIAGNOSIS — Z79.899 IMMUNOSUPPRESSION DUE TO DRUG THERAPY: ICD-10-CM

## 2022-10-24 DIAGNOSIS — I63.9 ISCHEMIC STROKE: ICD-10-CM

## 2022-10-24 DIAGNOSIS — M32.14 LUPUS NEPHRITIS: Primary | ICD-10-CM

## 2022-10-24 DIAGNOSIS — E55.9 VITAMIN D DEFICIENCY, UNSPECIFIED: ICD-10-CM

## 2022-10-24 PROCEDURE — 99215 PR OFFICE/OUTPT VISIT, EST, LEVL V, 40-54 MIN: ICD-10-PCS | Mod: S$PBB,,, | Performed by: INTERNAL MEDICINE

## 2022-10-24 PROCEDURE — 99999 PR PBB SHADOW E&M-EST. PATIENT-LVL III: CPT | Mod: PBBFAC,,, | Performed by: INTERNAL MEDICINE

## 2022-10-24 PROCEDURE — 99213 OFFICE O/P EST LOW 20 MIN: CPT | Mod: 25,PBBFAC | Performed by: INTERNAL MEDICINE

## 2022-10-24 PROCEDURE — 99999 PR PBB SHADOW E&M-EST. PATIENT-LVL III: ICD-10-PCS | Mod: PBBFAC,,, | Performed by: INTERNAL MEDICINE

## 2022-10-24 PROCEDURE — 99215 OFFICE O/P EST HI 40 MIN: CPT | Mod: S$PBB,,, | Performed by: INTERNAL MEDICINE

## 2022-10-24 RX ORDER — PREDNISONE 2.5 MG/1
2.5 TABLET ORAL DAILY
Start: 2022-10-24 | End: 2023-07-07 | Stop reason: SDUPTHER

## 2022-10-24 RX ORDER — VIT C/E/ZN/COPPR/LUTEIN/ZEAXAN 250MG-90MG
1000 CAPSULE ORAL DAILY
Qty: 90 CAPSULE | Refills: 0 | Status: SHIPPED | OUTPATIENT
Start: 2022-10-24 | End: 2022-10-24 | Stop reason: ALTCHOICE

## 2022-10-24 RX ORDER — ATORVASTATIN CALCIUM 40 MG/1
40 TABLET, FILM COATED ORAL DAILY
Qty: 90 TABLET | Refills: 3 | Status: SHIPPED | OUTPATIENT
Start: 2022-10-24 | End: 2023-01-18

## 2022-10-24 RX ORDER — MYCOPHENOLATE MOFETIL 500 MG/1
1500 TABLET ORAL 2 TIMES DAILY
Qty: 540 TABLET | Refills: 1 | Status: SHIPPED | OUTPATIENT
Start: 2022-10-24 | End: 2023-07-07 | Stop reason: SDUPTHER

## 2022-10-24 RX ORDER — ERGOCALCIFEROL 1.25 MG/1
50000 CAPSULE ORAL
Qty: 28 CAPSULE | Refills: 1 | Status: SHIPPED | OUTPATIENT
Start: 2022-10-24 | End: 2023-05-02

## 2022-10-24 NOTE — PROGRESS NOTES
REASON FOR CONSULT/CHIEF COMPLAIN: Acute renal failure, h/o Lupus nephritis    REFERRING PHYSICIAN: [facility] -Primary Care    HISTORY OF PRESENT ILLNESS: 47 y.o. female  patient was referred here for abnormal renal function. Denied chronic NSAID use, no known exposure to lithium, lead.   Early 2000's she was first time diagnosed with Lupus after a kidney biopsy at [facility], the results are not available now. She also had mild/small strokes at that time. She was give some infusions at that time like every two weeks (she thinks its cyclophosphamide). Since then she reportedly has been on Cellcept, Plaquenil and prednisone. She reports that her medications have not been changed. On reviewing the limited records it is unclear what dose of Cellcept she was taking, she has two cellcept pill bottles at home, one said 500 MG one tablet TID, other said 500 mg three tablets BID. She had BLAYNE with proteinuria in July 2021, wanted to get a kidney biopsy but the patient was hesitant. Gave her high dose prednisone starting July 2021 which improved the BLAYNE and proteinuria, Currently on minimal dose. Seen Rheum who recommended adding Belimumab but the patient did not want to.   Reports being out of Cellcept for atleast a week now. Denied any new issues with urination including dysuria, hematuria, urgency, hesitancy, nocturia, incomplete voiding. Denied chest pain, nausea, vomiting, abd pain, nausea, vomiting, diarrhea, shortness of breath, pedal edema, Orthopnea, PND.  Home Blood pressures are not checked     ROS:  General: negative for chills, or fatigue  Respiratory: No cough, shortness of breath, or wheezing  Cardiovascular: No chest pain or dyspnea   Gastrointestinal: No abdominal pain, change in bowel habits  Genito-Urinary: No dysuria, trouble voiding, or hematuria  Neurological: No new focal weakness, no numbness  Dermatological: No rash or ulcers.    PAST MEDICAL HISTORY:  Past Medical History:    Diagnosis Date    Disorder of kidney and ureter     Hypertension     SLE (systemic lupus erythematosus)     Stroke     Vertigo        PAST SURGICAL HISTORY:  Past Surgical History:   Procedure Laterality Date    RENAL BIOPSY         FAMILY HISTORY:   Family History   Problem Relation Age of Onset    Diabetes Mother     Cancer Sister         Breast    Stroke Neg Hx     Heart attack Neg Hx        SOCIAL HISTORY:  Social History     Socioeconomic History    Marital status: Single   Tobacco Use    Smoking status: Never    Smokeless tobacco: Never   Substance and Sexual Activity    Alcohol use: No    Drug use: No       ALLERGIES:  Review of patient's allergies indicates:  No Known Allergies    MEDICATIONS:    Current Outpatient Medications:     aspirin (ECOTRIN) 81 MG EC tablet, Take 81 mg by mouth once daily., Disp: , Rfl:     hydrOXYchloroQUINE (PLAQUENIL) 200 mg tablet, Take 2 tablets (400 mg total) by mouth every Mon, Tues, Wed, Thurs, Fri., Disp: 180 tablet, Rfl: 1    atorvastatin (LIPITOR) 40 MG tablet, Take 1 tablet (40 mg total) by mouth once daily., Disp: 90 tablet, Rfl: 3    cholecalciferol, vitamin D3, (VITAMIN D3) 25 mcg (1,000 unit) capsule, Take 1 capsule (1,000 Units total) by mouth once daily., Disp: 90 capsule, Rfl: 0    mycophenolate (CELLCEPT) 500 mg Tab, Take 3 tablets (1,500 mg total) by mouth 2 (two) times daily., Disp: 540 tablet, Rfl: 1    predniSONE (DELTASONE) 2.5 MG tablet, Take 1 tablet (2.5 mg total) by mouth once daily., Disp: , Rfl:    Medication List with Changes/Refills   New Medications    ATORVASTATIN (LIPITOR) 40 MG TABLET    Take 1 tablet (40 mg total) by mouth once daily.    CHOLECALCIFEROL, VITAMIN D3, (VITAMIN D3) 25 MCG (1,000 UNIT) CAPSULE    Take 1 capsule (1,000 Units total) by mouth once daily.   Current Medications    ASPIRIN (ECOTRIN) 81 MG EC TABLET    Take 81 mg by mouth once daily.    HYDROXYCHLOROQUINE (PLAQUENIL) 200 MG TABLET    Take 2 tablets (400 mg total) by mouth  every Mon, Tues, Wed, Thurs, Fri.   Changed and/or Refilled Medications    Modified Medication Previous Medication    MYCOPHENOLATE (CELLCEPT) 500 MG TAB mycophenolate (CELLCEPT) 500 mg Tab       Take 3 tablets (1,500 mg total) by mouth 2 (two) times daily.    Take 3 tablets (1,500 mg total) by mouth 2 (two) times daily.    PREDNISONE (DELTASONE) 2.5 MG TABLET predniSONE (DELTASONE) 5 MG tablet       Take 1 tablet (2.5 mg total) by mouth once daily.    Take 1 tablet (5 mg total) by mouth once daily.   Discontinued Medications    PANTOPRAZOLE (PROTONIX) 40 MG TABLET    TAKE 1 TABLET BY MOUTH EVERY DAY        PHYSICAL EXAM:  /80   Wt 55.8 kg (123 lb 0.3 oz)   BMI 21.12 kg/m²     General: No distress, No fever or chills  Neck: No adenopathy,no carotid bruit,no JVD  Lungs:Clear to auscultation bilaterally, No Crackles  Heart: Regular rate and rhythm, no murmur, gallops or rubs  Abdomen: Soft, no tenderness, bowel sounds normal  Extremities: Atraumatic, no edema in LE  Skin: Turgor normal. Rash on face  Neurologic: No focal weakness, oriented.  Dialysis Access: Non applicable        LABS:  Lab Results   Component Value Date    HGB 9.4 (L) 08/19/2022        Lab Results   Component Value Date    CREATININE 0.8 08/19/2022       Prot/Creat Ratio, Urine   Date Value Ref Range Status   08/19/2022 0.55 (H) 0.00 - 0.20 Final   04/13/2022 0.69 (H) 0.00 - 0.20 Final   03/08/2022 0.38 (H) 0.00 - 0.20 Final       Lab Results   Component Value Date     08/19/2022    K 4.1 08/19/2022    CO2 27 08/19/2022       last PTH   Lab Results   Component Value Date    PTH 47.4 02/07/2022    CALCIUM 9.2 08/19/2022    PHOS 4.2 08/19/2022       Lab Results   Component Value Date    HGBA1C 5.5 04/02/2021       Lab Results   Component Value Date    LDLCALC 126.8 04/02/2021         Creatinine, Urine   Date Value Ref Range Status   08/19/2022 136.0 15.0 - 325.0 mg/dL Final   04/13/2022 132.0 15.0 - 325.0 mg/dL Final   03/08/2022 192.0  15.0 - 325.0 mg/dL Final       Protein, Urine Random   Date Value Ref Range Status   08/19/2022 75 (H) 0 - 15 mg/dL Final   04/13/2022 91 (H) 0 - 15 mg/dL Final   03/08/2022 72 (H) 0 - 15 mg/dL Final       Prot/Creat Ratio, Urine   Date Value Ref Range Status   08/19/2022 0.55 (H) 0.00 - 0.20 Final   04/13/2022 0.69 (H) 0.00 - 0.20 Final   03/08/2022 0.38 (H) 0.00 - 0.20 Final         ASSESSMENT:    1) Lupus Nephritis unknown class partial remission  2) Immunosuppression due to drug therapy  3) Systemic Lupus.  4) Hyperkalemia - Resolved  5) Vitamin D deficiency  6) Ischemic stroke   Pt was reportedly seeing Dr. Adamson in 2018 and it is unclear if she is seeing any nephrologist since then. Last rheumatology clinic notes outside [facility] system stated that patient is on Cellcept 3 Gr/day, Plaquenil 200 mg BID and prednisone 2.5 mg daily. Due to proteinuria there was discussion about kidney biopsy too. There was also a question about patients medication compliance in the past due to patients forgetfulness. Her ultrasound 4/2021 showed 9.8 and 9.2 cm kidneys.   During her first clinic visit (around April 2021) her creatinine came back at 1.8 which is above her baseline of less than one and hyperkalemia. Discontinued her lisinopril and her BLAYNE and hyperkalemia improved making sublinical hypotension likely etiology for this. Urine microscopy and urine analysis did not show significant RBC, protein creatinine ratio did not show significant proteinuria.   Next clinic visit in July 2021 showed elevated creatinine and proteinuria. Started on prednisone which lead to improvement in kidney function and proteinuria. Requested patient to get a kidney biopsy, however she was hesitant. Re-discussed about Benlysta and kidney biopsy patient did not want them at this time. She also wants to get the prednisone dose decrease, I worry that her chance of relapse is high and discussed the same. Also since July 2021 to April 2022 (10 months  duration) she only filled 6 months worth of medication.   Patient declined Biopsy and Benlysta/Volcosporin.    Acid Base, electrolytes, Hemoglobin, Bone Mineral in acceptable range.    - Continue Cellcept 3 Gr/day, plaquenil 200 mg BID (Mon-Fri). (Has been missing doses very consistently)  - Prednisone at 2.5 mg daily  - Requested to see eye doctor (informed daughter)  - Discussed with daughter that lupus might not fully controlled due to missing doses. Daughter is to check on pts meds at home to make sure she has all of them (daughter has access to pts mychart, pt gave verbal permission)  - Continue to hold lisinopril for now.  - Avoid NSAIDs intake  - Re-counselled about being compliant with medications  - Sent statin to pharmacy      RTC in 2 months    Diagnosis and plan of care explained to the patient and daughter (on phone) at length.  Verbalized understanding. Answered all questions.  Thanks for allowing me to participate in the care of this patient.     1:47 PM    Franko Faria MD  Nephrology & Critical Care    25 minutes of total time spent on the encounter, which includes face to face time and non-face to face time preparing to see the patient (eg, review of tests), Obtaining and/or reviewing separately obtained history, documenting clinical information in the electronic or other health record, independently interpreting results (not separately reported) and communicating results to the patient/family/caregiver, or Care coordination (not separately reported).

## 2022-10-24 NOTE — PATIENT INSTRUCTIONS
- Please make sure you are taking all your medications.  - Please see eye doctor.  - Follow up in 2 months.

## 2022-12-02 ENCOUNTER — TELEPHONE (OUTPATIENT)
Dept: NEPHROLOGY | Facility: CLINIC | Age: 47
End: 2022-12-02
Payer: MEDICARE

## 2022-12-02 NOTE — TELEPHONE ENCOUNTER
----- Message from Kassandra Huston sent at 12/2/2022  2:07 PM CST -----  Type:  Sooner Apoointment Request    Caller is requesting a sooner appointment.  Caller declined first available appointment listed below.  Caller will not accept being placed on the waitlist and is requesting a message be sent to doctor.  Name of Caller:ROSALIO PASHA KIANA [[id number]]  When is the first available appointment?none   Symptoms:pt states she received a letter stating she needed an appt in January.  Would the patient rather a call back or a response via [facility]? Call   Best Call Back Number:[phone]

## 2023-01-05 ENCOUNTER — HOSPITAL ENCOUNTER (EMERGENCY)
Facility: OTHER | Age: 48
Discharge: HOME | End: 2023-01-05
Attending: EMERGENCY MEDICINE
Payer: MEDICARE

## 2023-01-05 VITALS
HEART RATE: 97 BPM | SYSTOLIC BLOOD PRESSURE: 97 MMHG | WEIGHT: 125 LBS | RESPIRATION RATE: 17 BRPM | OXYGEN SATURATION: 100 % | DIASTOLIC BLOOD PRESSURE: 62 MMHG | TEMPERATURE: 100 F | BODY MASS INDEX: 21.46 KG/M2

## 2023-01-05 DIAGNOSIS — R51.9 NONINTRACTABLE HEADACHE, UNSPECIFIED CHRONICITY PATTERN, UNSPECIFIED HEADACHE TYPE: ICD-10-CM

## 2023-01-05 DIAGNOSIS — B34.9 VIRAL SYNDROME: Primary | ICD-10-CM

## 2023-01-05 LAB
CTP QC/QA: YES
CTP QC/QA: YES
POC MOLECULAR INFLUENZA A AGN: NEGATIVE
POC MOLECULAR INFLUENZA B AGN: NEGATIVE
SARS-COV-2 RDRP RESP QL NAA+PROBE: NEGATIVE

## 2023-01-05 PROCEDURE — 25000003 PHARM REV CODE 250: Performed by: PHYSICIAN ASSISTANT

## 2023-01-05 PROCEDURE — 99283 EMERGENCY DEPT VISIT LOW MDM: CPT

## 2023-01-05 PROCEDURE — 87635 SARS-COV-2 COVID-19 AMP PRB: CPT | Performed by: PHYSICIAN ASSISTANT

## 2023-01-05 RX ORDER — ONDANSETRON 4 MG/1
4 TABLET, ORALLY DISINTEGRATING ORAL
Status: COMPLETED | OUTPATIENT
Start: 2023-01-05 | End: 2023-01-05

## 2023-01-05 RX ORDER — IBUPROFEN 400 MG/1
800 TABLET ORAL
Status: DISCONTINUED | OUTPATIENT
Start: 2023-01-05 | End: 2023-01-06 | Stop reason: HOSPADM

## 2023-01-05 RX ORDER — ONDANSETRON 4 MG/1
4 TABLET, ORALLY DISINTEGRATING ORAL EVERY 6 HOURS PRN
Qty: 10 TABLET | Refills: 0 | Status: SHIPPED | OUTPATIENT
Start: 2023-01-05 | End: 2023-10-11 | Stop reason: ALTCHOICE

## 2023-01-05 RX ORDER — ACETAMINOPHEN 500 MG
1000 TABLET ORAL
Status: COMPLETED | OUTPATIENT
Start: 2023-01-05 | End: 2023-01-05

## 2023-01-05 RX ADMIN — ONDANSETRON 4 MG: 4 TABLET, ORALLY DISINTEGRATING ORAL at 10:01

## 2023-01-05 RX ADMIN — ACETAMINOPHEN 1000 MG: 500 TABLET, FILM COATED ORAL at 09:01

## 2023-01-06 NOTE — ED PROVIDER NOTES
Source of History:  Patient     Chief complaint:  Headache (Pt c/o constant headache with nausea and chills X 1 day. + photophobia. Pt denies taking any OTC meds.)      HPI:  Babak Arevalo is a 47 y.o. female with lupus who is presenting to the emergency department with general malaise, myalgias, nausea, headache and photophobia.  She states symptoms began this afternoon, shortly after eating salty food.  She reports head a bit on left side but is now located all over.  She denies neck stiffness.  She did not taken analgesics for pain.  She denies urinary symptoms, no new rashes.  She denies abdominal pain.  She states she feels like she needs to have a bowel movement but is unable to do so.   This is the extent to the patients complaints today here in the emergency department.    ROS: As per HPI and below:  General: + fever and chills   Eyes: + photophobia   ENT: No sore throat. No congestion.  Head: + headache    Respiratory: No shortness of breath.  No cough.  Cardiovascular: No chest pain.  Abdomen: No abdominal pain. + nausea  Genito-Urinary: No abnormal urination.  Neurologic: No focal weakness.  No numbness.  MSK: + myalgias   Integument: No rashes or lesions.  Allergy/immunology:  Not immunocompromised.     Review of patient's allergies indicates:  No Known Allergies    PMH:  As per HPI and below:  Past Medical History:   Diagnosis Date    Disorder of kidney and ureter     Hypertension     SLE (systemic lupus erythematosus)     Stroke     Vertigo      Past Surgical History:   Procedure Laterality Date    RENAL BIOPSY         Social History     Tobacco Use    Smoking status: Never    Smokeless tobacco: Never   Substance Use Topics    Alcohol use: No    Drug use: No       Physical Exam:    /65   Pulse 103   Temp (!) 101.7 °F (38.7 °C) (Oral)   Resp 18   Wt 56.7 kg (125 lb)   SpO2 100%   BMI 21.46 kg/m²   Nursing note and vital signs reviewed.  Appearance: No acute distress.   Eyes: No  conjunctival injection.  Normal EOM.  ENT: Oropharynx clear.    Chest/ Respiratory: Clear to auscultation bilaterally.  Good air movement.  No wheezes.  No rhonchi. No rales. No accessory muscle use.  Cardiovascular: Regular rate and rhythm.  No murmurs. No gallops. No rubs.  Abdomen: Soft.  Not distended.  Nontender.  No guarding.  No rebound. Non-peritoneal.  Musculoskeletal: Good range of motion all joints.  No deformities.  Neck supple.  No meningismus.  Skin: No rashes seen.  Good turgor.  No abrasions.  No ecchymoses.  Neurologic: Motor intact.  Sensation intact.  Cerebellar intact.  No facial droop.  Mental Status:  Alert and oriented x 3.  Appropriate, conversant.   Labs that have been ordered have been independently reviewed and interpreted by myself.    I decided to obtain the patient's medical records.    MDM/ Differential Dx:    47 y.o. female who is presenting to emergency department with flu/viral symptoms which began today.  Patient is febrile but nontoxic appearing hemodynamically stable.  No signs or symptoms to suggest bacterial etiology.  I do not suspect meningitis.  COVID and flu test are negative.  Suspect likely other virus or false negative due to early testing.  Patient given medications for supportive care.  Fever resolved upon discharge from ED.           Diagnostic Impression:    1. Viral syndrome    2. Nonintractable headache, unspecified chronicity pattern, unspecified headache type                   Ivan Gleason PA-C  01/06/23 1947

## 2023-01-06 NOTE — FIRST PROVIDER EVALUATION
Emergency Department TeleTriage Encounter Note      CHIEF COMPLAINT    Chief Complaint   Patient presents with    Headache     Pt c/o constant headache with nausea and chills X 1 day. + photophobia. Pt denies taking any OTC meds.       VITAL SIGNS   Initial Vitals [01/05/23 2015]   BP Pulse Resp Temp SpO2   136/65 103 18 (!) 101.7 °F (38.7 °C) 100 %      MAP       --            ALLERGIES    Review of patient's allergies indicates:  No Known Allergies    PROVIDER TRIAGE NOTE  47-year-old female presents with a fever and headache.  Symptoms for 1 day.  Did not take any medication prior to arrival.  Febrile here.      ORDERS  Labs Reviewed   SARS-COV-2 RDRP GENE   POCT INFLUENZA A/B MOLECULAR       ED Orders (720h ago, onward)      Start Ordered     Status Ordering Provider    01/05/23 2115 01/05/23 2100  ibuprofen tablet 800 mg  ED 1 Time         Ordered LORELEI HALL    01/05/23 2101 01/05/23 2100  POCT COVID-19 Rapid Screening  Once         Ordered LORELEI HALL    01/05/23 2101 01/05/23 2100  POCT Influenza A/B Molecular  Once         Ordered LORELEI HALL    01/05/23 2100 01/05/23 2059  acetaminophen tablet 1,000 mg  ED 1 Time         Ordered LORELEI HALL              Virtual Visit Note: The provider triage portion of this emergency department evaluation and documentation was performed via [employer], a HIPAA-compliant telemedicine application, in concert with a tele-presenter in the room. A face to face patient evaluation with one of my colleagues will occur once the patient is placed in an emergency department room.      DISCLAIMER: This note was prepared with [employer] voice recognition transcription software. Garbled syntax, mangled pronouns, and other bizarre constructions may be attributed to that software system.

## 2023-01-15 NOTE — PROGRESS NOTES
Subjective:     Patient ID: Babak Arevalo is a 47 y.o. female w SLE followed by nephrology & heme-onc  Chief Complaint: No chief complaint on file.                 She reports no joint swelling. Associated symptoms include myalgias (intermittent). Pertinent negatives include no dysuria or fatigue.          47yr old lady seen for the first time on 8/13/21 & last on 10/12/22. Her PCP is at [facility], but she does not know her name.   She returns for f/u.  She is still taking MMF 3 g/d, HCQ 6 tablets/wk (apparently told to take that dose by nephrology) & prednisone 2.5 mg/d.  She is followed by nephrology, Dr. Faria for proteinuria due to lupus nephritis (renal bx 2001) and is MMF & prednisone are prescribed by Dr. GARNER. Last U pr/cnne <1 g (10/24/22)  She still continues wo SLE complaints. Denies AM stiffness, joint swelling, dysphagia, tight skin, oral ulcers, parotid gland swelling, dry eyes, dry mouth, pleurisy, pericarditis, photosensitivity, muscle weakness; fevers, family hx. She still has inactive DLE lesions. Still getting Raynaud's of hands & feet (since 2001).   In the past has had patches of hair loss & 1 miscarriage in 1997 out of 4 pregnancies.  She has a brother who's had on PE & is on long term anticoagulation.    Labs are with persisting dsDNA antibodies and hypochromic microcytic anemia  Also has low vitamin D levels.  She saw Dr. Faria this AM & has some labs pending.     She prefers getting both renal & rheum appts on the same day.       SLE dx 2001 followed by the late Dr. Kramer after she had a syncopal episode, was hospitalized and ultimately found to have SLE  She states that she also was found to have CVAs .  At that time had renal bx that confirmed lupus nephritis & was on CTX every month ~ 1yr +.  Has been on HCQ since 2001.  Has subsequently been on MMF x many years.  Has been on prednisone chronically. Lowest dose was 2.5 mg/d.  Was upped to 40 mg/d on 7/22/21 by nephrology when she was  found to have renal insufficiency and proteinuria    Has had many CVA's in 2001 --was on coumadin  x few years,    In April was hospitalized at [facility] as she kept on having left sided head pain & MRI/MRA showed infarcts in the left temporal & occipital lobes, splenium of the corpus callosum and left PCA & posterior MCA. 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  There was w/u for emboli but apparently not positive.          Current Outpatient Medications   Medication Sig Dispense Refill    aspirin (ECOTRIN) 81 MG EC tablet Take 81 mg by mouth once daily.      atorvastatin (LIPITOR) 40 MG tablet Take 1 tablet (40 mg total) by mouth once daily. 90 tablet 3    ergocalciferol (ERGOCALCIFEROL) 50,000 unit Cap Take 1 capsule (50,000 Units total) by mouth every 7 days. for 28 doses 28 capsule 1    hydrOXYchloroQUINE (PLAQUENIL) 200 mg tablet Take 2 tablets (400 mg total) by mouth every Mon, Tues, Wed, Thurs, Fri. 180 tablet 1    mycophenolate (CELLCEPT) 500 mg Tab Take 3 tablets (1,500 mg total) by mouth 2 (two) times daily. 540 tablet 1    ondansetron (ZOFRAN-ODT) 4 MG TbDL Take 1 tablet (4 mg total) by mouth every 6 (six) hours as needed (nausea). 10 tablet 0    predniSONE (DELTASONE) 2.5 MG tablet Take 1 tablet (2.5 mg total) by mouth once daily.       No current facility-administered medications for this visit.           Review of patient's allergies indicates:  No Known Allergies    Review of Systems   Constitutional:  Negative for fatigue.        Usual weight is 125; had gone down to 90 lbs by hx. Now 117 lbs.   HENT: Negative.  Negative for hearing loss and postnasal drip.    Eyes: Negative.  Negative for photophobia and pain.   Respiratory: Negative.  Negative for cough and shortness of breath.    Cardiovascular: Negative.  Negative for chest pain and leg swelling.   Gastrointestinal:  Negative for abdominal distention, nausea and vomiting.   Endocrine: Negative.  Negative for cold intolerance and  "heat intolerance.   Genitourinary:  Positive for menstrual problem. Negative for difficulty urinating, dysuria and genital sores.        Scant menses; no hot flushes/night sweats.   Musculoskeletal:  Positive for myalgias (intermittent). Negative for arthralgias, back pain, joint swelling, neck pain and neck stiffness.   Neurological:  Positive for syncope (remotely).   Hematological:  Negative for adenopathy. Does not bruise/bleed easily.   Psychiatric/Behavioral:  Negative for dysphoric mood and sleep disturbance. The patient is not nervous/anxious.      Past Medical History:   Diagnosis Date    Disorder of kidney and ureter     Hypertension     SLE (systemic lupus erythematosus)     Stroke     Vertigo        Past Surgical History:   Procedure Laterality Date    RENAL BIOPSY         Family History   Problem Relation Age of Onset    Diabetes Mother     Cancer Sister         Breast    Stroke Neg Hx     Heart attack Neg Hx    M: 69 yr old w arthritis x years.  F: in a wheelchair got shot  4 sisters --one had breast cancer  1 brother  Has 11 yr old w asthma--controlled  20 yr old son  Has a 23 yr old daughter who used to work at [employer] as a phlebotomist now at [employer]    Social History     Tobacco Use    Smoking status: Never    Smokeless tobacco: Never   Substance Use Topics    Alcohol use: No    Drug use: No       Objective:       /77   Pulse 82   Ht 5' 4" (1.626 m)   Wt 58.2 kg (128 lb 4.9 oz)   BMI 22.02 kg/m²     Accompanied by her 23 yr old daughter.    Physical Exam   Constitutional: She is oriented to person, place, and time. No distress.   HENT:   Head: Normocephalic and atraumatic.   Mouth/Throat: Oropharynx is clear and moist. Mucous membranes are moist. No oropharyngeal exudate. Oropharynx is clear.   No facial rashes  Parotids not enlarged  No oral ulcers   Eyes: Pupils are equal, round, and reactive to light. Conjunctivae are normal. Right eye exhibits no discharge. Left eye " exhibits no discharge. No scleral icterus.   Neck: No JVD present. No tracheal deviation present. No thyromegaly present.   Cardiovascular: Normal rate, regular rhythm and normal heart sounds. Exam reveals no gallop and no friction rub.   No murmur heard.  Pulmonary/Chest: Effort normal and breath sounds normal. No respiratory distress. She has no wheezes. She has no rales. She exhibits no tenderness.   Abdominal: Soft. Bowel sounds are normal. She exhibits no distension and no mass. There is no splenomegaly or hepatomegaly. There is no abdominal tenderness. There is no rebound and no guarding.   Musculoskeletal:         General: No tenderness. Normal range of motion.      Cervical back: Neck supple.      Comments: Cspine FROM no tenderness  Tspine FROM no tenderness  Lspine FROM no tenderness.  TMJ: unremarkable  Shoulders: FROM; no synovitis;  Elbows: FROM; no synovitis; no tophi or nodules  Wrists: FROM; no synovitis;    MCPs: FROM; no synovitis; no metacarpalgia;  ok;  PIPs:FROM; no synovitis;   DIPs: FROM; no synovitis;   HIPS: FROM  Knees: FROM; no synovitis; no instability;  Ankles: FROM: no synovitis   Toes: ok;        Lymphadenopathy:     She has no cervical adenopathy.   Neurological: She is alert and oriented to person, place, and time. She has normal reflexes. No cranial nerve deficit. Gait normal.   Proximal and distal muscle strength 5/5.   Skin: Skin is warm and dry. No rash noted. She is not diaphoretic.   See photos;  DLE rashes bridge of nose, ear canals and posterior scalp.   Psychiatric: Mood, memory, affect and judgment normal.   Vitals reviewed.    1/18/23: CBC Hg 10.2; Ht 34.4; plt 116; Vit D 10  10/24/22: CBC Hg 9.8; Ht 31.8; low indices: BMP alb 3.4; UA 1+ pr; 2+ OB; WC >100; 15 RBC; 4 H; U pr/cnne .93; CPK 61; Vit D 16; dsDNA 1:640; C3 60; C4 14;   8/19/22: CBC Hg 9.7; Ht 30.4; CMP Alb 3.3; UA 1+ pr; 1+ bl; 7 RBC; U pr/cnne -.55; dsDNA 1:640;   6/17/22: CBC Hg 10.7; Ht 33; low indices;  plt 144; CMP ok; dsDNA 1:640; C3 67; C4 17  4/13/22: UA 2+ pr; 1+ leuk; & RBC;  U pr/cne .69  10/28/21: ESR 50 (20); CRP 3.1; CBC Hg 10.1; Ht 33.3; low indices; BMP ok; UA 2+ OB; neg pro; U pr/cnne 0.39;  C3 79; C4 21;   9/24/21:CBC Hg 9.1; Ht 29.4; plts 142; WC 3.82;  BMP glu 65; phosh 4.7 (4.5); CPK 65; Ua 2+ pr; 1+ OB; U pr/cnne 0.42; ds DNA 1:320    8/13/21:  (36); CRP 1.0; RF neg; C3 72; C4 19; IgG 2506 (1600); IgA 979 (350);   7/19/21: CBC Hg 10.8; Ht 34.8; BMP cnne 1.7; GFR 42; P 4.7 (4.5); UA 3+ pr; U pr/cnne 1.28; PTH 53;   MARSHA 1>2560 Sp: dsDNA 1:80; +SSA+Sm; +RNP; C3 60; C4 23  6/10/21: haptoglobin 136; CARLEY neg;  5/6/21: LAC neg; aCLA neg/neg; beta 2 gly IgA 55 (20); rest ok;   Assessment:   SLE since 2001   +MARSHA+dsDNA+Sm+RNP   Ds DNA ab chronically elevated   Mild leukopenia & thrombocytopenia--last 116k   DLE/patchy alopecia/lupus nephritis     Lupus nephritis by hx & bx   Max proteinuria 1.28 g (7/921)   S/P CTX 0117-5463    mg bid 3 x/wk.   On MMF 1500 mg bid   On prednisone 2.5 mg/d.      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    S/P CKD 3   BLAYNE on chronic    Vitamin D insufficiency/deficiency         Plan:   Patient will resume HCQ 10 tablets/wk which is calculated based on her weight...  We will let Nephrologist handle her MMF & steroids & vitamin D supplementation.  Schedule DXA  Labs in 3 months  RTC 3-4 months or prn      CC: Franko Faria MD

## 2023-01-18 ENCOUNTER — OFFICE VISIT (OUTPATIENT)
Dept: RHEUMATOLOGY | Facility: CLINIC | Age: 48
End: 2023-01-18
Payer: MEDICARE

## 2023-01-18 ENCOUNTER — OFFICE VISIT (OUTPATIENT)
Dept: NEPHROLOGY | Facility: CLINIC | Age: 48
End: 2023-01-18
Payer: MEDICARE

## 2023-01-18 VITALS
WEIGHT: 124.75 LBS | DIASTOLIC BLOOD PRESSURE: 80 MMHG | SYSTOLIC BLOOD PRESSURE: 110 MMHG | HEIGHT: 64 IN | BODY MASS INDEX: 21.3 KG/M2

## 2023-01-18 VITALS
BODY MASS INDEX: 21.91 KG/M2 | DIASTOLIC BLOOD PRESSURE: 77 MMHG | SYSTOLIC BLOOD PRESSURE: 129 MMHG | WEIGHT: 128.31 LBS | HEART RATE: 82 BPM | HEIGHT: 64 IN

## 2023-01-18 DIAGNOSIS — Z79.899 LONG-TERM USE OF HYDROXYCHLOROQUINE: ICD-10-CM

## 2023-01-18 DIAGNOSIS — M32.9 SYSTEMIC LUPUS ERYTHEMATOSUS, UNSPECIFIED SLE TYPE, UNSPECIFIED ORGAN INVOLVEMENT STATUS: Primary | ICD-10-CM

## 2023-01-18 DIAGNOSIS — M32.9 SYSTEMIC LUPUS ERYTHEMATOSUS, UNSPECIFIED SLE TYPE, UNSPECIFIED ORGAN INVOLVEMENT STATUS: ICD-10-CM

## 2023-01-18 DIAGNOSIS — Z79.899 IMMUNOSUPPRESSION DUE TO DRUG THERAPY: Primary | ICD-10-CM

## 2023-01-18 DIAGNOSIS — E55.9 VITAMIN D DEFICIENCY, UNSPECIFIED: ICD-10-CM

## 2023-01-18 DIAGNOSIS — M32.14 LUPUS NEPHRITIS: ICD-10-CM

## 2023-01-18 DIAGNOSIS — D84.821 IMMUNOSUPPRESSION DUE TO DRUG THERAPY: Primary | ICD-10-CM

## 2023-01-18 DIAGNOSIS — Z79.52 LONG TERM (CURRENT) USE OF SYSTEMIC STEROIDS: ICD-10-CM

## 2023-01-18 DIAGNOSIS — Z79.60 LONG-TERM USE OF IMMUNOSUPPRESSANT MEDICATION: ICD-10-CM

## 2023-01-18 PROCEDURE — 99215 OFFICE O/P EST HI 40 MIN: CPT | Mod: S$PBB,,, | Performed by: INTERNAL MEDICINE

## 2023-01-18 PROCEDURE — 99999 PR PBB SHADOW E&M-EST. PATIENT-LVL III: CPT | Mod: PBBFAC,,, | Performed by: INTERNAL MEDICINE

## 2023-01-18 PROCEDURE — 99213 OFFICE O/P EST LOW 20 MIN: CPT | Mod: PBBFAC | Performed by: INTERNAL MEDICINE

## 2023-01-18 PROCEDURE — 99213 OFFICE O/P EST LOW 20 MIN: CPT | Mod: PBBFAC,27 | Performed by: INTERNAL MEDICINE

## 2023-01-18 PROCEDURE — 99999 PR PBB SHADOW E&M-EST. PATIENT-LVL III: ICD-10-PCS | Mod: PBBFAC,,, | Performed by: INTERNAL MEDICINE

## 2023-01-18 PROCEDURE — 99214 PR OFFICE/OUTPT VISIT, EST, LEVL IV, 30-39 MIN: ICD-10-PCS | Mod: S$PBB,,, | Performed by: INTERNAL MEDICINE

## 2023-01-18 PROCEDURE — 99214 OFFICE O/P EST MOD 30 MIN: CPT | Mod: S$PBB,,, | Performed by: INTERNAL MEDICINE

## 2023-01-18 PROCEDURE — 99215 PR OFFICE/OUTPT VISIT, EST, LEVL V, 40-54 MIN: ICD-10-PCS | Mod: S$PBB,,, | Performed by: INTERNAL MEDICINE

## 2023-01-18 NOTE — PROGRESS NOTES
REASON FOR CONSULT/CHIEF COMPLAIN: Acute renal failure, h/o Lupus nephritis    REFERRING PHYSICIAN: [facility] -Primary Care    HISTORY OF PRESENT ILLNESS: 47 y.o. female  patient was referred here for abnormal renal function. Denied chronic NSAID use, no known exposure to lithium, lead.   Early 2000's she was first time diagnosed with Lupus after a kidney biopsy at [facility], the results are not available now. She also had mild/small strokes at that time. She was give some infusions at that time like every two weeks (she thinks its cyclophosphamide). Since then she reportedly has been on Cellcept, Plaquenil and prednisone. She reports that her medications have not been changed. On reviewing the limited records it is unclear what dose of Cellcept she was taking, she has had two cellcept pill bottles at home, one said 500 MG one tablet TID, other said 500 mg three tablets BID. She had BLAYNE with proteinuria in July 2021, wanted to get a kidney biopsy but the patient was hesitant. Gave her high dose prednisone starting July 2021 which improved the BLAYNE and proteinuria, Currently on minimal dose. Seen Rheum who recommended adding Belimumab but the patient did not want to.   Reports taking medications regularly. Denied any new issues with urination including dysuria, hematuria, urgency, hesitancy, nocturia, incomplete voiding. Denied chest pain, nausea, vomiting, abd pain, nausea, vomiting, diarrhea, shortness of breath, pedal edema, Orthopnea, PND.  Home Blood pressures are not checked     ROS:  General: negative for chills, or fatigue  Respiratory: No cough, shortness of breath, or wheezing  Cardiovascular: No chest pain or dyspnea   Gastrointestinal: No abdominal pain, change in bowel habits  Genito-Urinary: No dysuria, trouble voiding, or hematuria  Neurological: No new focal weakness, no numbness  Dermatological: No rash or ulcers.    PAST MEDICAL HISTORY:  Past Medical History:   Diagnosis Date     Disorder of kidney and ureter     Hypertension     SLE (systemic lupus erythematosus)     Stroke     Vertigo        PAST SURGICAL HISTORY:  Past Surgical History:   Procedure Laterality Date    RENAL BIOPSY         FAMILY HISTORY:   Family History   Problem Relation Age of Onset    Diabetes Mother     Cancer Sister         Breast    Stroke Neg Hx     Heart attack Neg Hx        SOCIAL HISTORY:  Social History     Socioeconomic History    Marital status: Single   Tobacco Use    Smoking status: Never    Smokeless tobacco: Never   Substance and Sexual Activity    Alcohol use: No    Drug use: No       ALLERGIES:  Review of patient's allergies indicates:  No Known Allergies    MEDICATIONS:    Current Outpatient Medications:     aspirin (ECOTRIN) 81 MG EC tablet, Take 81 mg by mouth once daily., Disp: , Rfl:     atorvastatin (LIPITOR) 40 MG tablet, Take 1 tablet (40 mg total) by mouth once daily., Disp: 90 tablet, Rfl: 3    ergocalciferol (ERGOCALCIFEROL) 50,000 unit Cap, Take 1 capsule (50,000 Units total) by mouth every 7 days. for 28 doses, Disp: 28 capsule, Rfl: 1    hydrOXYchloroQUINE (PLAQUENIL) 200 mg tablet, Take 2 tablets (400 mg total) by mouth every Mon, Tues, Wed, Thurs, Fri., Disp: 180 tablet, Rfl: 1    mycophenolate (CELLCEPT) 500 mg Tab, Take 3 tablets (1,500 mg total) by mouth 2 (two) times daily., Disp: 540 tablet, Rfl: 1    ondansetron (ZOFRAN-ODT) 4 MG TbDL, Take 1 tablet (4 mg total) by mouth every 6 (six) hours as needed (nausea)., Disp: 10 tablet, Rfl: 0    predniSONE (DELTASONE) 2.5 MG tablet, Take 1 tablet (2.5 mg total) by mouth once daily., Disp: , Rfl:    Medication List with Changes/Refills   Current Medications    ASPIRIN (ECOTRIN) 81 MG EC TABLET    Take 81 mg by mouth once daily.    ATORVASTATIN (LIPITOR) 40 MG TABLET    Take 1 tablet (40 mg total) by mouth once daily.    ERGOCALCIFEROL (ERGOCALCIFEROL) 50,000 UNIT CAP    Take 1 capsule (50,000 Units total) by mouth every 7 days. for 28  "doses    HYDROXYCHLOROQUINE (PLAQUENIL) 200 MG TABLET    Take 2 tablets (400 mg total) by mouth every Mon, Tues, Wed, Thurs, Fri.    MYCOPHENOLATE (CELLCEPT) 500 MG TAB    Take 3 tablets (1,500 mg total) by mouth 2 (two) times daily.    ONDANSETRON (ZOFRAN-ODT) 4 MG TBDL    Take 1 tablet (4 mg total) by mouth every 6 (six) hours as needed (nausea).    PREDNISONE (DELTASONE) 2.5 MG TABLET    Take 1 tablet (2.5 mg total) by mouth once daily.        PHYSICAL EXAM:  /80   Ht 5' 4" (1.626 m)   Wt 56.6 kg (124 lb 12.5 oz)   BMI 21.42 kg/m²     General: No distress, No fever or chills  Neck: No adenopathy,no carotid bruit,no JVD  Lungs:Clear to auscultation bilaterally, No Crackles  Heart: Regular rate and rhythm, no murmur, gallops or rubs  Abdomen: Soft, no tenderness, bowel sounds normal  Extremities: Atraumatic, no edema in LE  Skin: Turgor normal. Rash on face  Neurologic: No focal weakness, oriented.  Dialysis Access: Non applicable        LABS:  Lab Results   Component Value Date    HGB 9.8 (L) 10/24/2022        Lab Results   Component Value Date    CREATININE 0.8 10/24/2022       Prot/Creat Ratio, Urine   Date Value Ref Range Status   10/24/2022 0.93 (H) 0.00 - 0.20 Final   08/19/2022 0.55 (H) 0.00 - 0.20 Final   04/13/2022 0.69 (H) 0.00 - 0.20 Final       Lab Results   Component Value Date     10/24/2022    K 3.9 10/24/2022    CO2 23 10/24/2022       last PTH   Lab Results   Component Value Date    PTH 50.3 10/24/2022    CALCIUM 9.3 10/24/2022    PHOS 3.5 10/24/2022       Lab Results   Component Value Date    HGBA1C 5.5 04/02/2021       Lab Results   Component Value Date    LDLCALC 126.8 04/02/2021         Creatinine, Urine   Date Value Ref Range Status   10/24/2022 128.0 15.0 - 325.0 mg/dL Final   08/19/2022 136.0 15.0 - 325.0 mg/dL Final   04/13/2022 132.0 15.0 - 325.0 mg/dL Final       Protein, Urine Random   Date Value Ref Range Status   10/24/2022 119 (H) 0 - 15 mg/dL Final   08/19/2022 75 (H) " 0 - 15 mg/dL Final   04/13/2022 91 (H) 0 - 15 mg/dL Final       Prot/Creat Ratio, Urine   Date Value Ref Range Status   10/24/2022 0.93 (H) 0.00 - 0.20 Final   08/19/2022 0.55 (H) 0.00 - 0.20 Final   04/13/2022 0.69 (H) 0.00 - 0.20 Final         ASSESSMENT:    1) Lupus Nephritis unknown class partial remission  2) Immunosuppression due to drug therapy  3) Systemic Lupus.  4) Hyperkalemia - Resolved  5) Vitamin D deficiency  6) Ischemic stroke   Pt was reportedly seeing Dr. Adamson in 2018 and it is unclear if she is seeing any nephrologist since then. [facility] stated that patient is on Cellcept 3 Gr/day, Plaquenil 200 mg BID and prednisone 2.5 mg daily. Due to proteinuria there was discussion about kidney biopsy too. There was also a question about patients medication compliance in the past due to patients forgetfulness. Her ultrasound 4/2021 showed 9.8 and 9.2 cm kidneys.   During her first clinic visit (around April 2021) her creatinine came back at 1.8 which is above her baseline of less than one and hyperkalemia. Discontinued her lisinopril and her BLAYNE and hyperkalemia improved making sublinical hypotension likely etiology for this. Urine microscopy and urine analysis did not show significant RBC, protein creatinine ratio did not show significant proteinuria.   Next clinic visit in July 2021 showed elevated creatinine and proteinuria. Started on prednisone which lead to improvement in kidney function and proteinuria. Requested patient to get a kidney biopsy, however she was hesitant. Re-discussed about Benlysta and kidney biopsy during subsequent visits patient did not want them. Also since July 2021 to April 2022 (10 months duration) she only filled 6 months worth of medication.   Patient declined Biopsy and Benlysta/Volcosporin.    Acid Base, electrolytes, Hemoglobin, Bone Mineral in acceptable range.    - Continue Cellcept 3 Gr/day, plaquenil 200 mg BID (Mon-Fri).  (Was missing doses, requested daughter to start pill counting to reinforce compliance).   - Prednisone at 2.5 mg daily, Vitamin D continues to be low, I worry she might not be taking it. Another reason I might have to risk and take the prednisone out.  - Requested to see eye doctor (informed daughter)  - Pt and daughter aware that Lupus is not fully controlled, and it is unlikely to get controlled without absolute compliance with medications +/- adding Benlysta/Voclosporin.  - Continue to hold lisinopril for now.  - Avoid NSAIDs intake  - Re-counselled about being compliant with medications      RTC in 3 months    Diagnosis and plan of care explained to the patient and daughter (on phone) at length.  Verbalized understanding. Answered all questions.  Thanks for allowing me to participate in the care of this patient.     1:47 PM    Franko Faria MD  Nephrology & Critical Care    25 minutes of total time spent on the encounter, which includes face to face time and non-face to face time preparing to see the patient (eg, review of tests), Obtaining and/or reviewing separately obtained history, documenting clinical information in the electronic or other health record, independently interpreting results (not separately reported) and communicating results to the patient/family/caregiver, or Care coordination (not separately reported).

## 2023-01-18 NOTE — PATIENT INSTRUCTIONS
Please make sure you take Hydroxychloroquine 2 tablets M-F.  Keep up with eye exams.    Let us know if you don't hear from Nephrology on vitamin D supplementation.

## 2023-01-19 ENCOUNTER — PATIENT MESSAGE (OUTPATIENT)
Dept: NEPHROLOGY | Facility: CLINIC | Age: 48
End: 2023-01-19
Payer: MEDICARE

## 2023-01-31 ENCOUNTER — TELEPHONE (OUTPATIENT)
Dept: RHEUMATOLOGY | Facility: CLINIC | Age: 48
End: 2023-01-31
Payer: MEDICARE

## 2023-01-31 ENCOUNTER — PATIENT MESSAGE (OUTPATIENT)
Dept: RHEUMATOLOGY | Facility: CLINIC | Age: 48
End: 2023-01-31
Payer: MEDICARE

## 2023-04-19 ENCOUNTER — LAB VISIT (OUTPATIENT)
Dept: LAB | Facility: HOSPITAL | Age: 48
End: 2023-04-19
Attending: INTERNAL MEDICINE
Payer: MEDICARE

## 2023-04-19 ENCOUNTER — OFFICE VISIT (OUTPATIENT)
Dept: RHEUMATOLOGY | Facility: CLINIC | Age: 48
End: 2023-04-19
Payer: MEDICARE

## 2023-04-19 ENCOUNTER — OFFICE VISIT (OUTPATIENT)
Dept: NEPHROLOGY | Facility: CLINIC | Age: 48
End: 2023-04-19
Payer: MEDICARE

## 2023-04-19 VITALS — WEIGHT: 125 LBS | BODY MASS INDEX: 21.34 KG/M2 | HEIGHT: 64 IN

## 2023-04-19 VITALS
DIASTOLIC BLOOD PRESSURE: 80 MMHG | SYSTOLIC BLOOD PRESSURE: 120 MMHG | HEART RATE: 86 BPM | HEIGHT: 64 IN | BODY MASS INDEX: 21.07 KG/M2 | WEIGHT: 123.44 LBS

## 2023-04-19 DIAGNOSIS — M32.9 SYSTEMIC LUPUS ERYTHEMATOSUS, UNSPECIFIED SLE TYPE, UNSPECIFIED ORGAN INVOLVEMENT STATUS: ICD-10-CM

## 2023-04-19 DIAGNOSIS — M32.9 SYSTEMIC LUPUS ERYTHEMATOSUS, UNSPECIFIED SLE TYPE, UNSPECIFIED ORGAN INVOLVEMENT STATUS: Primary | ICD-10-CM

## 2023-04-19 DIAGNOSIS — Z79.52 LONG TERM (CURRENT) USE OF SYSTEMIC STEROIDS: ICD-10-CM

## 2023-04-19 DIAGNOSIS — Z79.899 LONG-TERM USE OF HYDROXYCHLOROQUINE: ICD-10-CM

## 2023-04-19 DIAGNOSIS — R82.90 ABNORMAL URINE: ICD-10-CM

## 2023-04-19 DIAGNOSIS — I63.9 ISCHEMIC STROKE: ICD-10-CM

## 2023-04-19 DIAGNOSIS — Z79.899 IMMUNOSUPPRESSION DUE TO DRUG THERAPY: ICD-10-CM

## 2023-04-19 DIAGNOSIS — D53.9 MACROCYTIC ANEMIA: ICD-10-CM

## 2023-04-19 DIAGNOSIS — Z79.899 IMMUNOSUPPRESSION DUE TO DRUG THERAPY: Primary | ICD-10-CM

## 2023-04-19 DIAGNOSIS — Z79.60 LONG-TERM USE OF IMMUNOSUPPRESSANT MEDICATION: ICD-10-CM

## 2023-04-19 DIAGNOSIS — E55.9 VITAMIN D DEFICIENCY, UNSPECIFIED: ICD-10-CM

## 2023-04-19 DIAGNOSIS — D84.821 IMMUNOSUPPRESSION DUE TO DRUG THERAPY: ICD-10-CM

## 2023-04-19 DIAGNOSIS — D84.821 IMMUNOSUPPRESSION DUE TO DRUG THERAPY: Primary | ICD-10-CM

## 2023-04-19 DIAGNOSIS — M32.14 LUPUS NEPHRITIS: ICD-10-CM

## 2023-04-19 DIAGNOSIS — N18.32 STAGE 3B CHRONIC KIDNEY DISEASE: ICD-10-CM

## 2023-04-19 LAB
BACTERIA #/AREA URNS AUTO: NORMAL /HPF
BILIRUB UR QL STRIP: NEGATIVE
CLARITY UR REFRACT.AUTO: CLEAR
COLOR UR AUTO: YELLOW
CREAT UR-MCNC: 74 MG/DL (ref 15–325)
GLUCOSE UR QL STRIP: NEGATIVE
HGB UR QL STRIP: ABNORMAL
HYALINE CASTS UR QL AUTO: 0 /LPF
KETONES UR QL STRIP: NEGATIVE
LEUKOCYTE ESTERASE UR QL STRIP: ABNORMAL
MICROSCOPIC COMMENT: NORMAL
NITRITE UR QL STRIP: NEGATIVE
PH UR STRIP: 5 [PH] (ref 5–8)
PROT UR QL STRIP: ABNORMAL
PROT UR-MCNC: 40 MG/DL (ref 0–15)
PROT/CREAT UR: 0.54 MG/G{CREAT} (ref 0–0.2)
RBC #/AREA URNS AUTO: 4 /HPF (ref 0–4)
SP GR UR STRIP: 1.01 (ref 1–1.03)
SQUAMOUS #/AREA URNS AUTO: 1 /HPF
URN SPEC COLLECT METH UR: ABNORMAL
WBC #/AREA URNS AUTO: 4 /HPF (ref 0–5)

## 2023-04-19 PROCEDURE — 99215 PR OFFICE/OUTPT VISIT, EST, LEVL V, 40-54 MIN: ICD-10-PCS | Mod: S$GLB,,, | Performed by: INTERNAL MEDICINE

## 2023-04-19 PROCEDURE — 81001 URINALYSIS AUTO W/SCOPE: CPT | Performed by: INTERNAL MEDICINE

## 2023-04-19 PROCEDURE — 1159F PR MEDICATION LIST DOCUMENTED IN MEDICAL RECORD: ICD-10-PCS | Mod: CPTII,S$GLB,, | Performed by: INTERNAL MEDICINE

## 2023-04-19 PROCEDURE — 1160F RVW MEDS BY RX/DR IN RCRD: CPT | Mod: CPTII,S$GLB,, | Performed by: INTERNAL MEDICINE

## 2023-04-19 PROCEDURE — 99999 PR PBB SHADOW E&M-EST. PATIENT-LVL III: ICD-10-PCS | Mod: PBBFAC,,, | Performed by: INTERNAL MEDICINE

## 2023-04-19 PROCEDURE — 3008F BODY MASS INDEX DOCD: CPT | Mod: CPTII,S$GLB,, | Performed by: INTERNAL MEDICINE

## 2023-04-19 PROCEDURE — 1159F MED LIST DOCD IN RCRD: CPT | Mod: CPTII,S$GLB,, | Performed by: INTERNAL MEDICINE

## 2023-04-19 PROCEDURE — 99215 OFFICE O/P EST HI 40 MIN: CPT | Mod: S$GLB,,, | Performed by: INTERNAL MEDICINE

## 2023-04-19 PROCEDURE — 3079F PR MOST RECENT DIASTOLIC BLOOD PRESSURE 80-89 MM HG: ICD-10-PCS | Mod: CPTII,S$GLB,, | Performed by: INTERNAL MEDICINE

## 2023-04-19 PROCEDURE — 3066F PR DOCUMENTATION OF TREATMENT FOR NEPHROPATHY: ICD-10-PCS | Mod: CPTII,S$GLB,, | Performed by: INTERNAL MEDICINE

## 2023-04-19 PROCEDURE — 1160F PR REVIEW ALL MEDS BY PRESCRIBER/CLIN PHARMACIST DOCUMENTED: ICD-10-PCS | Mod: CPTII,S$GLB,, | Performed by: INTERNAL MEDICINE

## 2023-04-19 PROCEDURE — 3008F PR BODY MASS INDEX (BMI) DOCUMENTED: ICD-10-PCS | Mod: CPTII,S$GLB,, | Performed by: INTERNAL MEDICINE

## 2023-04-19 PROCEDURE — 3074F SYST BP LT 130 MM HG: CPT | Mod: CPTII,S$GLB,, | Performed by: INTERNAL MEDICINE

## 2023-04-19 PROCEDURE — 3079F DIAST BP 80-89 MM HG: CPT | Mod: CPTII,S$GLB,, | Performed by: INTERNAL MEDICINE

## 2023-04-19 PROCEDURE — 99214 OFFICE O/P EST MOD 30 MIN: CPT | Mod: S$GLB,,, | Performed by: INTERNAL MEDICINE

## 2023-04-19 PROCEDURE — 99214 PR OFFICE/OUTPT VISIT, EST, LEVL IV, 30-39 MIN: ICD-10-PCS | Mod: S$GLB,,, | Performed by: INTERNAL MEDICINE

## 2023-04-19 PROCEDURE — 84156 ASSAY OF PROTEIN URINE: CPT | Performed by: INTERNAL MEDICINE

## 2023-04-19 PROCEDURE — 3066F NEPHROPATHY DOC TX: CPT | Mod: CPTII,S$GLB,, | Performed by: INTERNAL MEDICINE

## 2023-04-19 PROCEDURE — 99999 PR PBB SHADOW E&M-EST. PATIENT-LVL III: CPT | Mod: PBBFAC,,, | Performed by: INTERNAL MEDICINE

## 2023-04-19 PROCEDURE — 3074F PR MOST RECENT SYSTOLIC BLOOD PRESSURE < 130 MM HG: ICD-10-PCS | Mod: CPTII,S$GLB,, | Performed by: INTERNAL MEDICINE

## 2023-04-19 PROCEDURE — 87086 URINE CULTURE/COLONY COUNT: CPT | Performed by: INTERNAL MEDICINE

## 2023-04-19 RX ORDER — ATORVASTATIN CALCIUM 40 MG/1
40 TABLET, FILM COATED ORAL DAILY
COMMUNITY
End: 2023-07-07 | Stop reason: SDUPTHER

## 2023-04-19 NOTE — PROGRESS NOTES
REASON FOR CONSULT/CHIEF COMPLAIN: Acute renal failure, h/o Lupus nephritis    REFERRING PHYSICIAN: [facility] -Primary Care    HISTORY OF PRESENT ILLNESS: 47 y.o. female  patient was referred here for abnormal renal function. Denied chronic NSAID use, no known exposure to lithium, lead.   Early 2000's she was first time diagnosed with Lupus after a kidney biopsy at [facility], the results are not available now. She also had mild/small strokes at that time. She was give some infusions at that time like every two weeks (she thinks its cyclophosphamide). Since then she reportedly has been on Cellcept, Plaquenil and prednisone. She reports that her medications have not been changed. On reviewing the limited records it is unclear what dose of Cellcept she was taking, she has had two cellcept pill bottles at home, one said 500 MG one tablet TID, other said 500 mg three tablets BID. She had BLAYNE with proteinuria in July 2021, wanted to get a kidney biopsy but the patient was hesitant. Gave her high dose prednisone starting July 2021 which improved the BLAYNE and proteinuria, Currently on minimal dose. Seen Rheum who recommended adding Belimumab but the patient did not want to.   Reports taking medications most of the time. Denied any new issues with urination including dysuria, hematuria, urgency, hesitancy, nocturia, incomplete voiding. Denied chest pain, nausea, vomiting, abd pain, nausea, vomiting, diarrhea, shortness of breath, pedal edema, Orthopnea, PND.  Home Blood pressures are not checked     ROS:  General: negative for chills, or fatigue  Respiratory: No cough, shortness of breath, or wheezing  Cardiovascular: No chest pain or dyspnea   Gastrointestinal: No abdominal pain, change in bowel habits  Genito-Urinary: No dysuria, trouble voiding, or hematuria    PAST MEDICAL HISTORY:  Past Medical History:   Diagnosis Date    Disorder of kidney and ureter     Hypertension     SLE (systemic lupus  erythematosus)     Stroke     Vertigo        PAST SURGICAL HISTORY:  Past Surgical History:   Procedure Laterality Date    RENAL BIOPSY         FAMILY HISTORY:   Family History   Problem Relation Age of Onset    Diabetes Mother     Cancer Sister         Breast    Stroke Neg Hx     Heart attack Neg Hx        SOCIAL HISTORY:  Social History     Socioeconomic History    Marital status: Single   Tobacco Use    Smoking status: Never    Smokeless tobacco: Never   Substance and Sexual Activity    Alcohol use: No    Drug use: No       ALLERGIES:  Review of patient's allergies indicates:   Allergen Reactions    Sulfamethoxazole-trimethoprim Rash       MEDICATIONS:    Current Outpatient Medications:     aspirin (ECOTRIN) 81 MG EC tablet, Take 81 mg by mouth once daily., Disp: , Rfl:     ergocalciferol (ERGOCALCIFEROL) 50,000 unit Cap, Take 1 capsule (50,000 Units total) by mouth every 7 days. for 28 doses, Disp: 28 capsule, Rfl: 1    hydrOXYchloroQUINE (PLAQUENIL) 200 mg tablet, Take 2 tablets (400 mg total) by mouth every Mon, Tues, Wed, Thurs, Fri., Disp: 180 tablet, Rfl: 1    mycophenolate (CELLCEPT) 500 mg Tab, Take 3 tablets (1,500 mg total) by mouth 2 (two) times daily., Disp: 540 tablet, Rfl: 1    ondansetron (ZOFRAN-ODT) 4 MG TbDL, Take 1 tablet (4 mg total) by mouth every 6 (six) hours as needed (nausea)., Disp: 10 tablet, Rfl: 0    predniSONE (DELTASONE) 2.5 MG tablet, Take 1 tablet (2.5 mg total) by mouth once daily., Disp: , Rfl:    Medication List with Changes/Refills   Current Medications    ASPIRIN (ECOTRIN) 81 MG EC TABLET    Take 81 mg by mouth once daily.    ERGOCALCIFEROL (ERGOCALCIFEROL) 50,000 UNIT CAP    Take 1 capsule (50,000 Units total) by mouth every 7 days. for 28 doses    HYDROXYCHLOROQUINE (PLAQUENIL) 200 MG TABLET    Take 2 tablets (400 mg total) by mouth every Mon, Tues, Wed, Thurs, Fri.    MYCOPHENOLATE (CELLCEPT) 500 MG TAB    Take 3 tablets (1,500 mg total) by mouth 2 (two) times daily.     "ONDANSETRON (ZOFRAN-ODT) 4 MG TBDL    Take 1 tablet (4 mg total) by mouth every 6 (six) hours as needed (nausea).    PREDNISONE (DELTASONE) 2.5 MG TABLET    Take 1 tablet (2.5 mg total) by mouth once daily.        PHYSICAL EXAM:  Ht 5' 4" (1.626 m)   Wt 56 kg (123 lb 7.3 oz)   BMI 21.19 kg/m²     General: No distress, No fever or chills  Neck: No adenopathy,no carotid bruit,no JVD  Lungs:Clear to auscultation bilaterally, No Crackles  Heart: Regular rate and rhythm, no murmur, gallops or rubs  Abdomen: Soft, no tenderness, bowel sounds normal  Extremities: Atraumatic, no edema in LE  Skin: Turgor normal. Rash on face  Neurologic: No focal weakness, oriented.  Dialysis Access: Non applicable        LABS:  Lab Results   Component Value Date    HGB 10.2 (L) 01/18/2023        Lab Results   Component Value Date    CREATININE 0.7 01/18/2023       Prot/Creat Ratio, Urine   Date Value Ref Range Status   01/18/2023 0.83 (H) 0.00 - 0.20 Final   10/24/2022 0.93 (H) 0.00 - 0.20 Final   08/19/2022 0.55 (H) 0.00 - 0.20 Final       Lab Results   Component Value Date     01/18/2023    K 4.0 01/18/2023    CO2 23 01/18/2023       last PTH   Lab Results   Component Value Date    PTH 82.7 (H) 01/18/2023    CALCIUM 9.2 01/18/2023    PHOS 3.7 01/18/2023       Lab Results   Component Value Date    HGBA1C 5.5 04/02/2021       Lab Results   Component Value Date    LDLCALC 126.8 04/02/2021         Creatinine, Urine   Date Value Ref Range Status   01/18/2023 162.0 15.0 - 325.0 mg/dL Final   10/24/2022 128.0 15.0 - 325.0 mg/dL Final   08/19/2022 136.0 15.0 - 325.0 mg/dL Final       Protein, Urine Random   Date Value Ref Range Status   01/18/2023 135 (H) 0 - 15 mg/dL Final   10/24/2022 119 (H) 0 - 15 mg/dL Final   08/19/2022 75 (H) 0 - 15 mg/dL Final       Prot/Creat Ratio, Urine   Date Value Ref Range Status   01/18/2023 0.83 (H) 0.00 - 0.20 Final   10/24/2022 0.93 (H) 0.00 - 0.20 Final   08/19/2022 0.55 (H) 0.00 - 0.20 Final "         ASSESSMENT:    1) Lupus Nephritis unknown class partial remission  2) Immunosuppression due to drug therapy  3) Systemic Lupus.  4) Hyperkalemia - Resolved  5) Vitamin D deficiency  6) Ischemic stroke - continue aspirin and statin.   Pt was reportedly seeing Dr. Adamson in 2018 and it is unclear if she is seeing any nephrologist since then. Last rheumatology clinic notes outside [facility] system stated that patient is on Cellcept 3 Gr/day, Plaquenil 200 mg BID and prednisone 2.5 mg daily. Due to proteinuria there was discussion about kidney biopsy too. There was also a question about patients medication compliance in the past due to patients forgetfulness. Her ultrasound 4/2021 showed 9.8 and 9.2 cm kidneys.   During her first clinic visit (around April 2021) her creatinine came back at 1.8 which is above her baseline of less than one and hyperkalemia. Discontinued her lisinopril and her BLAYNE and hyperkalemia improved making sublinical hypotension likely etiology for this. Urine microscopy and urine analysis did not show significant RBC, protein creatinine ratio did not show significant proteinuria.   Next clinic visit in July 2021 showed elevated creatinine and proteinuria. Started on prednisone which lead to improvement in kidney function and proteinuria. Requested patient to get a kidney biopsy, however she was hesitant. Re-discussed about Benlysta and kidney biopsy during subsequent visits patient did not want them. Also since July 2021 to April 2022 (10 months duration) she only filled 6 months worth of medication.   Patient declined Biopsy and Benlysta/Volcosporin.    Acid Base, electrolytes, Hemoglobin, Bone Mineral in acceptable range.    - Continue Cellcept 3 Gr/day, plaquenil 200 mg BID (Mon-Fri). (Was missing doses, requested daughter to start pill counting to reinforce compliance in last visits).   - Prednisone at 2.5 mg daily, Vitamin D improved a little, (I worry she might not be taking it.)  -  Requested to see eye doctor (informed daughter in previous visits)  - Pt and daughter aware that Lupus is not fully controlled, and it is unlikely to get controlled without absolute compliance with medications +/- adding Benlysta/Voclosporin.  - Continue to hold lisinopril for now.  - Avoid NSAIDs intake  - Re-counselled about being compliant with medications  - Reminded to keep uptodate with cancer screening and vaccines.    RTC in 3 months    Diagnosis and plan of care explained to the patient and daughter (on phone) at length.  Verbalized understanding. Answered all questions.  Thanks for allowing me to participate in the care of this patient.     1:47 PM    Franko Faria MD  Nephrology & Critical Care    32 minutes of total time spent on the encounter, which includes face to face time and non-face to face time preparing to see the patient (eg, review of tests), Obtaining and/or reviewing separately obtained history, documenting clinical information in the electronic or other health record, independently interpreting results (not separately reported) and communicating results to the patient/family/caregiver, or Care coordination (not separately reported).

## 2023-04-19 NOTE — PROGRESS NOTES
Subjective:     Patient ID: Babak Arevalo is a 47 y.o. female w SLE followed by nephrology & heme-onc  Chief Complaint: No chief complaint on file.  PCP: pending               She reports no joint swelling. Associated symptoms include myalgias (intermittent). Pertinent negatives include no dysuria or fatigue.          47yr old lady w SLE  seen for the first time on 8/13/21 & last on 1/18/23.     She returns for f/u.  She is still taking MMF 3 g/d, HCQ 6 tablets/wk (apparently told to take that dose by nephrology) & prednisone 2.5 mg/d.    She is followed by nephrology, Dr. Faria for proteinuria due to lupus nephritis (renal bx 2001) and is MMF & prednisone are prescribed by Dr. GARNER. Last U pr/cnne <1 g (10/24/22)  She saw him this AM. She had labs this AM and they are pending.     She still continues wo SLE complaints. Has some aches and pains & feels unrefreshed when getting up in the AM. Denies AM stiffness, joint swelling, dysphagia, tight skin, oral ulcers, parotid gland swelling, dry eyes, dry mouth, pleurisy, pericarditis, photosensitivity, muscle weakness; fevers, family hx. She still has inactive DLE lesions. Gets Raynaud's of hands & feet (since 2001) often; no digital lesions.      In the past has had patches of hair loss & 1 miscarriage in 1997 out of 4 pregnancies. No longer w alopecia.  She has a brother who's had on PE & is on long term anticoagulation.    Did not get the DXA that was ordered..       SLE dx 2001 followed by the late Dr. Kramer after she had a syncopal episode, was hospitalized and ultimately found to have SLE  She states that she also was found to have CVAs .  At that time had renal bx that confirmed lupus nephritis & was on CTX every month ~ 1yr +.  Has been on HCQ since 2001.  Has subsequently been on MMF x many years.  Has been on prednisone chronically. Lowest dose was 2.5 mg/d.  Was upped to 40 mg/d on 7/22/21 by nephrology when she was found to have renal insufficiency and  proteinuria    Has had many CVA's in 2001 --was on coumadin  x few years,    In April was hospitalized at [facility] as she kept on having left sided head pain & MRI/MRA showed infarcts in the left temporal & occipital lobes, splenium of the corpus callosum and left PCA & posterior MCA. 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  There was w/u for emboli but apparently not positive.          Current Outpatient Medications   Medication Sig Dispense Refill    aspirin (ECOTRIN) 81 MG EC tablet Take 81 mg by mouth once daily.      ergocalciferol (ERGOCALCIFEROL) 50,000 unit Cap Take 1 capsule (50,000 Units total) by mouth every 7 days. for 28 doses 28 capsule 1    hydrOXYchloroQUINE (PLAQUENIL) 200 mg tablet Take 2 tablets (400 mg total) by mouth every Mon, Tues, Wed, Thurs, Fri. 180 tablet 1    mycophenolate (CELLCEPT) 500 mg Tab Take 3 tablets (1,500 mg total) by mouth 2 (two) times daily. 540 tablet 1    ondansetron (ZOFRAN-ODT) 4 MG TbDL Take 1 tablet (4 mg total) by mouth every 6 (six) hours as needed (nausea). 10 tablet 0    predniSONE (DELTASONE) 2.5 MG tablet Take 1 tablet (2.5 mg total) by mouth once daily.       No current facility-administered medications for this visit.           Review of patient's allergies indicates:   Allergen Reactions    Sulfamethoxazole-trimethoprim Rash       Review of Systems   Constitutional:  Negative for fatigue.        Usual weight is 125; had gone down to 90 lbs by hx. Now 117 lbs.   HENT: Negative.  Negative for hearing loss and postnasal drip.    Eyes: Negative.  Negative for photophobia and pain.   Respiratory: Negative.  Negative for cough and shortness of breath.    Cardiovascular: Negative.  Negative for chest pain and leg swelling.   Gastrointestinal:  Negative for abdominal distention, nausea and vomiting.   Endocrine: Negative.  Negative for cold intolerance and heat intolerance.   Genitourinary:  Positive for menstrual problem. Negative for difficulty  "urinating, dysuria and genital sores.        Scant menses; no hot flushes/night sweats.   Musculoskeletal:  Positive for myalgias (intermittent). Negative for arthralgias, back pain, joint swelling, neck pain and neck stiffness.   Neurological:  Positive for syncope (remotely).   Hematological:  Negative for adenopathy. Does not bruise/bleed easily.   Psychiatric/Behavioral:  Negative for dysphoric mood and sleep disturbance. The patient is not nervous/anxious.      Past Medical History:   Diagnosis Date    Disorder of kidney and ureter     Hypertension     SLE (systemic lupus erythematosus)     Stroke     Vertigo        Past Surgical History:   Procedure Laterality Date    RENAL BIOPSY         Family History   Problem Relation Age of Onset    Diabetes Mother     Cancer Sister         Breast    Stroke Neg Hx     Heart attack Neg Hx    M: 69 yr old w arthritis x years.  F: in a wheelchair got shot  4 sisters --one had breast cancer  1 brother  Has 11 yr old w asthma--controlled  20 yr old son  Has a 23 yr old daughter who used to work at [employer] as a phlebotomist now at [employer]    Social History     Tobacco Use    Smoking status: Never    Smokeless tobacco: Never   Substance Use Topics    Alcohol use: No    Drug use: No       Objective:     Ht 5' 4" (1.626 m)   Wt 56.7 kg (125 lb)   BMI 21.46 kg/m²  /80; HR 85.      Physical Exam   Constitutional: She is oriented to person, place, and time. No distress.   HENT:   Head: Normocephalic and atraumatic.   Mouth/Throat: Oropharynx is clear and moist. Mucous membranes are moist. No oropharyngeal exudate. Oropharynx is clear.   No facial rashes  Parotids not enlarged  No oral ulcers   Eyes: Pupils are equal, round, and reactive to light. Conjunctivae are normal. Right eye exhibits no discharge. Left eye exhibits no discharge. No scleral icterus.   Neck: No JVD present. No tracheal deviation present. No thyromegaly present.   Cardiovascular: Normal rate, " regular rhythm and normal heart sounds. Exam reveals no gallop and no friction rub.   No murmur heard.  Pulmonary/Chest: Effort normal and breath sounds normal. No respiratory distress. She has no wheezes. She has no rales. She exhibits no tenderness.   Abdominal: Soft. Bowel sounds are normal. She exhibits no distension and no mass. There is no splenomegaly or hepatomegaly. There is no abdominal tenderness. There is no rebound and no guarding.   Musculoskeletal:         General: No tenderness. Normal range of motion.      Cervical back: Neck supple.      Comments: Cspine FROM no tenderness  Tspine FROM no tenderness  Lspine FROM no tenderness.  TMJ: unremarkable  Shoulders: FROM; no synovitis;  Elbows: FROM; no synovitis; no tophi or nodules  Wrists: FROM; no synovitis;    MCPs: FROM; no synovitis; no metacarpalgia;  ok;  PIPs:FROM; no synovitis;   DIPs: FROM; no synovitis;   HIPS: FROM  Knees: FROM; no synovitis; no instability;  Ankles: FROM: no synovitis   Toes: ok;        Lymphadenopathy:     She has no cervical adenopathy.   Neurological: She is alert and oriented to person, place, and time. She has normal reflexes. No cranial nerve deficit. Gait normal.   Proximal and distal muscle strength 5/5.   Skin: Skin is warm and dry. No rash noted. She is not diaphoretic.   See photos;  DLE rashes bridge of nose, ear canals and posterior scalp.   Psychiatric: Mood, memory and affect normal.   Vitals reviewed.      1/18/23: CBC Hg 10.2; Ht 34.4; plt 116; CMP ok; Vit D 10; dsDNA 1:640; C3 67; C4 18; U pr/cnne 0.83  10/24/22: CBC Hg 9.8; Ht 31.8; low indices: BMP alb 3.4; UA 1+ pr; 2+ OB; WC >100; 15 RBC; 4 H; U pr/cnne .93; CPK 61; Vit D 16; dsDNA 1:640; C3 60; C4 14;   8/19/22: CBC Hg 9.7; Ht 30.4; CMP Alb 3.3; UA 1+ pr; 1+ bl; 7 RBC; U pr/cnne -.55; dsDNA 1:640;   6/17/22: CBC Hg 10.7; Ht 33; low indices; plt 144; CMP ok; dsDNA 1:640; C3 67; C4 17  4/13/22: UA 2+ pr; 1+ leuk; & RBC;  U pr/cne .69  10/28/21: ESR 50  (20); CRP 3.1; CBC Hg 10.1; Ht 33.3; low indices; BMP ok; UA 2+ OB; neg pro; U pr/cnne 0.39;  C3 79; C4 21;   9/24/21:CBC Hg 9.1; Ht 29.4; plts 142; WC 3.82;  BMP glu 65; phosh 4.7 (4.5); CPK 65; Ua 2+ pr; 1+ OB; U pr/cnne 0.42; ds DNA 1:320    8/13/21:  (36); CRP 1.0; RF neg; C3 72; C4 19; IgG 2506 (1600); IgA 979 (350);   7/19/21: CBC Hg 10.8; Ht 34.8; BMP cnne 1.7; GFR 42; P 4.7 (4.5); UA 3+ pr; U pr/cnne 1.28; PTH 53;   MARSHA 1>2560 Sp: dsDNA 1:80; +SSA+Sm; +RNP; C3 60; C4 23  6/10/21: haptoglobin 136; CARLEY neg;  5/6/21: LAC neg; aCLA neg/neg; beta 2 gly IgA 55 (20); rest ok;   Assessment:   SLE since 2001   +MARSHA+dsDNA+Sm+RNP   Ds DNA ab chronically elevated   Mild leukopenia & thrombocytopenia--last 116k   DLE/patchy alopecia/lupus nephritis     Lupus nephritis by hx & bx   Max proteinuria 1.28 g (7/921)   S/P CTX 0376-7279    mg bid 3 x/wk.   On MMF 1500 mg bid   On prednisone 2.5 mg/d.      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    S/P CKD 3   BLAYNE on chronic    Vitamin D insufficiency/deficiency   On ergocalciferol       Plan:   Discussed vaccines--does not want.  Discussed increasing HCQ--wants to remain same amount.  Continue MMF & prednisone as per nephrology.  Will check today's labs.  Will try to schedule DXA for when she comes back to see Dr. Faria.  If vitamin D does not adequately respond to ergocalciferol, cholecalciferol is a better choice.  RTC 6 months or prn      CC: Franko Faria MD

## 2023-04-20 LAB
BACTERIA UR CULT: NORMAL
BACTERIA UR CULT: NORMAL

## 2023-04-27 ENCOUNTER — TELEPHONE (OUTPATIENT)
Dept: RHEUMATOLOGY | Facility: CLINIC | Age: 48
End: 2023-04-27
Payer: MEDICAID

## 2023-04-27 DIAGNOSIS — Z79.52 LONG TERM (CURRENT) USE OF SYSTEMIC STEROIDS: Primary | ICD-10-CM

## 2023-04-28 NOTE — TELEPHONE ENCOUNTER
----- Message from Char Gonzalez RN sent at 4/27/2023 10:03 AM CDT -----  Please put dexa test  order in epic

## 2023-07-07 ENCOUNTER — OFFICE VISIT (OUTPATIENT)
Dept: NEPHROLOGY | Facility: CLINIC | Age: 48
End: 2023-07-07
Payer: MEDICARE

## 2023-07-07 VITALS
HEIGHT: 64 IN | BODY MASS INDEX: 20.92 KG/M2 | DIASTOLIC BLOOD PRESSURE: 90 MMHG | WEIGHT: 122.56 LBS | SYSTOLIC BLOOD PRESSURE: 120 MMHG

## 2023-07-07 DIAGNOSIS — M32.9 SYSTEMIC LUPUS ERYTHEMATOSUS, UNSPECIFIED SLE TYPE, UNSPECIFIED ORGAN INVOLVEMENT STATUS: ICD-10-CM

## 2023-07-07 DIAGNOSIS — Z79.899 IMMUNOSUPPRESSION DUE TO DRUG THERAPY: Primary | ICD-10-CM

## 2023-07-07 DIAGNOSIS — E55.9 VITAMIN D DEFICIENCY, UNSPECIFIED: ICD-10-CM

## 2023-07-07 DIAGNOSIS — I63.9 ISCHEMIC STROKE: ICD-10-CM

## 2023-07-07 DIAGNOSIS — D84.821 IMMUNOSUPPRESSION DUE TO DRUG THERAPY: Primary | ICD-10-CM

## 2023-07-07 DIAGNOSIS — D50.8 OTHER IRON DEFICIENCY ANEMIA: ICD-10-CM

## 2023-07-07 PROCEDURE — 1160F RVW MEDS BY RX/DR IN RCRD: CPT | Mod: CPTII,S$GLB,, | Performed by: INTERNAL MEDICINE

## 2023-07-07 PROCEDURE — 99999 PR PBB SHADOW E&M-EST. PATIENT-LVL III: CPT | Mod: PBBFAC,,, | Performed by: INTERNAL MEDICINE

## 2023-07-07 PROCEDURE — 3066F NEPHROPATHY DOC TX: CPT | Mod: CPTII,S$GLB,, | Performed by: INTERNAL MEDICINE

## 2023-07-07 PROCEDURE — 3066F PR DOCUMENTATION OF TREATMENT FOR NEPHROPATHY: ICD-10-PCS | Mod: CPTII,S$GLB,, | Performed by: INTERNAL MEDICINE

## 2023-07-07 PROCEDURE — 3008F PR BODY MASS INDEX (BMI) DOCUMENTED: ICD-10-PCS | Mod: CPTII,S$GLB,, | Performed by: INTERNAL MEDICINE

## 2023-07-07 PROCEDURE — 99215 OFFICE O/P EST HI 40 MIN: CPT | Mod: S$GLB,,, | Performed by: INTERNAL MEDICINE

## 2023-07-07 PROCEDURE — 1159F MED LIST DOCD IN RCRD: CPT | Mod: CPTII,S$GLB,, | Performed by: INTERNAL MEDICINE

## 2023-07-07 PROCEDURE — 3080F DIAST BP >= 90 MM HG: CPT | Mod: CPTII,S$GLB,, | Performed by: INTERNAL MEDICINE

## 2023-07-07 PROCEDURE — 3074F PR MOST RECENT SYSTOLIC BLOOD PRESSURE < 130 MM HG: ICD-10-PCS | Mod: CPTII,S$GLB,, | Performed by: INTERNAL MEDICINE

## 2023-07-07 PROCEDURE — 3074F SYST BP LT 130 MM HG: CPT | Mod: CPTII,S$GLB,, | Performed by: INTERNAL MEDICINE

## 2023-07-07 PROCEDURE — 3008F BODY MASS INDEX DOCD: CPT | Mod: CPTII,S$GLB,, | Performed by: INTERNAL MEDICINE

## 2023-07-07 PROCEDURE — 1159F PR MEDICATION LIST DOCUMENTED IN MEDICAL RECORD: ICD-10-PCS | Mod: CPTII,S$GLB,, | Performed by: INTERNAL MEDICINE

## 2023-07-07 PROCEDURE — 1160F PR REVIEW ALL MEDS BY PRESCRIBER/CLIN PHARMACIST DOCUMENTED: ICD-10-PCS | Mod: CPTII,S$GLB,, | Performed by: INTERNAL MEDICINE

## 2023-07-07 PROCEDURE — 99215 PR OFFICE/OUTPT VISIT, EST, LEVL V, 40-54 MIN: ICD-10-PCS | Mod: S$GLB,,, | Performed by: INTERNAL MEDICINE

## 2023-07-07 PROCEDURE — 3080F PR MOST RECENT DIASTOLIC BLOOD PRESSURE >= 90 MM HG: ICD-10-PCS | Mod: CPTII,S$GLB,, | Performed by: INTERNAL MEDICINE

## 2023-07-07 PROCEDURE — 99999 PR PBB SHADOW E&M-EST. PATIENT-LVL III: ICD-10-PCS | Mod: PBBFAC,,, | Performed by: INTERNAL MEDICINE

## 2023-07-07 RX ORDER — PANTOPRAZOLE SODIUM 20 MG/1
20 TABLET, DELAYED RELEASE ORAL DAILY
Qty: 90 TABLET | Refills: 1 | Status: SHIPPED | OUTPATIENT
Start: 2023-07-07 | End: 2023-10-11 | Stop reason: SDUPTHER

## 2023-07-07 RX ORDER — ERGOCALCIFEROL 1.25 MG/1
50000 CAPSULE ORAL
Qty: 28 CAPSULE | Refills: 1 | Status: SHIPPED | OUTPATIENT
Start: 2023-07-07 | End: 2023-12-13 | Stop reason: ALTCHOICE

## 2023-07-07 RX ORDER — MYCOPHENOLATE MOFETIL 500 MG/1
1500 TABLET ORAL 2 TIMES DAILY
Qty: 540 TABLET | Refills: 1 | Status: SHIPPED | OUTPATIENT
Start: 2023-07-07 | End: 2023-10-11 | Stop reason: SDUPTHER

## 2023-07-07 RX ORDER — PREDNISONE 2.5 MG/1
2.5 TABLET ORAL DAILY
Qty: 90 TABLET | Refills: 1 | Status: SHIPPED | OUTPATIENT
Start: 2023-07-07 | End: 2023-10-11 | Stop reason: SDUPTHER

## 2023-07-07 RX ORDER — HYDROXYCHLOROQUINE SULFATE 200 MG/1
200 TABLET, FILM COATED ORAL DAILY
Qty: 90 TABLET | Refills: 1 | Status: SHIPPED | OUTPATIENT
Start: 2023-07-07 | End: 2023-10-11 | Stop reason: SDUPTHER

## 2023-07-07 RX ORDER — ASPIRIN 81 MG/1
81 TABLET ORAL DAILY
Qty: 90 TABLET | Refills: 1 | Status: SHIPPED | OUTPATIENT
Start: 2023-07-07 | End: 2024-02-12 | Stop reason: SDUPTHER

## 2023-07-07 RX ORDER — ATORVASTATIN CALCIUM 40 MG/1
40 TABLET, FILM COATED ORAL DAILY
Qty: 90 TABLET | Refills: 1 | Status: SHIPPED | OUTPATIENT
Start: 2023-07-07 | End: 2023-10-11 | Stop reason: SDUPTHER

## 2023-07-07 NOTE — PROGRESS NOTES
REASON FOR CONSULT/CHIEF COMPLAIN: Acute renal failure, h/o Lupus nephritis    REFERRING PHYSICIAN: [facility] -Primary Care    HISTORY OF PRESENT ILLNESS: 47 y.o. female  patient was referred here for abnormal renal function. Denied chronic NSAID use, no known exposure to lithium, lead.   Early 2000's she was first time diagnosed with Lupus after a kidney biopsy at [facility], the results are not available now. She also had mild/small strokes at that time. She was give some infusions at that time like every two weeks (she thinks its cyclophosphamide). Since then she reportedly has been on Cellcept, Plaquenil and prednisone. She reports that her medications have not been changed. On reviewing the limited records it is unclear what dose of Cellcept she was taking, she has had two cellcept pill bottles at home, one said 500 MG one tablet TID, other said 500 mg three tablets BID. She had BLAYNE with proteinuria in July 2021, wanted to get a kidney biopsy but the patient was hesitant. Gave her high dose prednisone starting July 2021 which improved the BLAYNE and proteinuria, Currently on minimal dose. Seen Rheum who recommended adding Belimumab but the patient did not want to.   Has been out of all medications for two weeks now. Reports taking medications most of the time before that. Denied any new issues with urination including dysuria, hematuria, urgency, hesitancy, nocturia, incomplete voiding. Denied chest pain, nausea, vomiting, abd pain, nausea, vomiting, diarrhea, shortness of breath, pedal edema, Orthopnea, PND.  Home Blood pressures are not checked     ROS:  General: negative for chills, or fatigue  Respiratory: No cough, shortness of breath, or wheezing  Cardiovascular: No chest pain or dyspnea   Gastrointestinal: No abdominal pain, change in bowel habits  Genito-Urinary: No dysuria, trouble voiding, or hematuria    PAST MEDICAL HISTORY:  Past Medical History:   Diagnosis Date    Disorder of  kidney and ureter     Hypertension     SLE (systemic lupus erythematosus)     Stroke     Vertigo        PAST SURGICAL HISTORY:  Past Surgical History:   Procedure Laterality Date    RENAL BIOPSY         FAMILY HISTORY:   Family History   Problem Relation Age of Onset    Diabetes Mother     Cancer Sister         Breast    Stroke Neg Hx     Heart attack Neg Hx        SOCIAL HISTORY:  Social History     Socioeconomic History    Marital status: Single   Tobacco Use    Smoking status: Never    Smokeless tobacco: Never   Substance and Sexual Activity    Alcohol use: No    Drug use: No       ALLERGIES:  Review of patient's allergies indicates:   Allergen Reactions    Sulfamethoxazole-trimethoprim Rash       MEDICATIONS:    Current Outpatient Medications:     ondansetron (ZOFRAN-ODT) 4 MG TbDL, Take 1 tablet (4 mg total) by mouth every 6 (six) hours as needed (nausea)., Disp: 10 tablet, Rfl: 0    aspirin (ECOTRIN) 81 MG EC tablet, Take 1 tablet (81 mg total) by mouth once daily., Disp: 90 tablet, Rfl: 1    atorvastatin (LIPITOR) 40 MG tablet, Take 1 tablet (40 mg total) by mouth once daily., Disp: 90 tablet, Rfl: 1    ergocalciferol (ERGOCALCIFEROL) 50,000 unit Cap, Take 1 capsule (50,000 Units total) by mouth every 7 days., Disp: 28 capsule, Rfl: 1    hydrOXYchloroQUINE (PLAQUENIL) 200 mg tablet, Take 1 tablet (200 mg total) by mouth once daily., Disp: 90 tablet, Rfl: 1    mycophenolate (CELLCEPT) 500 mg Tab, Take 3 tablets (1,500 mg total) by mouth 2 (two) times daily., Disp: 540 tablet, Rfl: 1    pantoprazole (PROTONIX) 20 MG tablet, Take 1 tablet (20 mg total) by mouth once daily., Disp: 90 tablet, Rfl: 1    predniSONE (DELTASONE) 2.5 MG tablet, Take 1 tablet (2.5 mg total) by mouth once daily., Disp: 90 tablet, Rfl: 1   Medication List with Changes/Refills   New Medications    ERGOCALCIFEROL (ERGOCALCIFEROL) 50,000 UNIT CAP    Take 1 capsule (50,000 Units total) by mouth every 7 days.    PANTOPRAZOLE (PROTONIX) 20 MG  "TABLET    Take 1 tablet (20 mg total) by mouth once daily.   Current Medications    ONDANSETRON (ZOFRAN-ODT) 4 MG TBDL    Take 1 tablet (4 mg total) by mouth every 6 (six) hours as needed (nausea).   Changed and/or Refilled Medications    Modified Medication Previous Medication    ASPIRIN (ECOTRIN) 81 MG EC TABLET aspirin (ECOTRIN) 81 MG EC tablet       Take 1 tablet (81 mg total) by mouth once daily.    Take 81 mg by mouth once daily.    ATORVASTATIN (LIPITOR) 40 MG TABLET atorvastatin (LIPITOR) 40 MG tablet       Take 1 tablet (40 mg total) by mouth once daily.    Take 40 mg by mouth once daily.    HYDROXYCHLOROQUINE (PLAQUENIL) 200 MG TABLET hydrOXYchloroQUINE (PLAQUENIL) 200 mg tablet       Take 1 tablet (200 mg total) by mouth once daily.    Take 2 tablets (400 mg total) by mouth every Mon, Tues, Wed, Thurs, Fri.    MYCOPHENOLATE (CELLCEPT) 500 MG TAB mycophenolate (CELLCEPT) 500 mg Tab       Take 3 tablets (1,500 mg total) by mouth 2 (two) times daily.    Take 3 tablets (1,500 mg total) by mouth 2 (two) times daily.    PREDNISONE (DELTASONE) 2.5 MG TABLET predniSONE (DELTASONE) 2.5 MG tablet       Take 1 tablet (2.5 mg total) by mouth once daily.    Take 1 tablet (2.5 mg total) by mouth once daily.        PHYSICAL EXAM:  BP (!) 120/90   Ht 5' 4" (1.626 m)   Wt 55.6 kg (122 lb 9.2 oz)   BMI 21.04 kg/m²     General: No distress, No fever or chills  Lungs:Clear to auscultation bilaterally, No Crackles  Heart: Regular rate and rhythm, no murmur, gallops or rubs  Abdomen: Soft, no tenderness, bowel sounds normal  Extremities: Atraumatic, no edema in LE  Skin: Turgor normal. Rash on face  Neurologic: No focal weakness, oriented.  Dialysis Access: Non applicable        LABS:  Lab Results   Component Value Date    HGB 10.2 (L) 07/07/2023        Lab Results   Component Value Date    CREATININE 0.9 07/07/2023       Prot/Creat Ratio, Urine   Date Value Ref Range Status   04/19/2023 0.54 (H) 0.00 - 0.20 Final "   01/18/2023 0.83 (H) 0.00 - 0.20 Final   10/24/2022 0.93 (H) 0.00 - 0.20 Final       Lab Results   Component Value Date     07/07/2023    K 5.2 (H) 07/07/2023    CO2 26 07/07/2023       last PTH   Lab Results   Component Value Date    PTH 59.0 07/07/2023    CALCIUM 9.4 07/07/2023    PHOS 4.8 (H) 07/07/2023       Lab Results   Component Value Date    HGBA1C 5.5 04/02/2021       Lab Results   Component Value Date    LDLCALC 112.0 07/07/2023         Creatinine, Urine   Date Value Ref Range Status   04/19/2023 74.0 15.0 - 325.0 mg/dL Final   01/18/2023 162.0 15.0 - 325.0 mg/dL Final   10/24/2022 128.0 15.0 - 325.0 mg/dL Final       Protein, Urine Random   Date Value Ref Range Status   04/19/2023 40 (H) 0 - 15 mg/dL Final   01/18/2023 135 (H) 0 - 15 mg/dL Final   10/24/2022 119 (H) 0 - 15 mg/dL Final       Prot/Creat Ratio, Urine   Date Value Ref Range Status   04/19/2023 0.54 (H) 0.00 - 0.20 Final   01/18/2023 0.83 (H) 0.00 - 0.20 Final   10/24/2022 0.93 (H) 0.00 - 0.20 Final         ASSESSMENT:    1) Lupus Nephritis unknown class partial remission  2) Immunosuppression due to drug therapy  3) Systemic Lupus.  4) Hyperkalemia - Resolved  5) Vitamin D deficiency  6) Ischemic stroke - continue aspirin and statin.   Pt was reportedly seeing Dr. Adamson in 2018 and it is unclear if she is seeing any nephrologist since then. [facility] rheumatology clinic [facility] stated that patient is on Cellcept 3 Gr/day, Plaquenil 200 mg BID and prednisone 2.5 mg daily. Due to proteinuria there was discussion about kidney biopsy too. There was also a question about patients medication compliance in the past due to patients forgetfulness. Her ultrasound 4/2021 showed 9.8 and 9.2 cm kidneys.   During her first clinic visit (around April 2021) her creatinine came back at 1.8 which is above her baseline of less than one and hyperkalemia. Discontinued her lisinopril and her BLAYNE and hyperkalemia improved making sublinical  hypotension likely etiology for this. Urine microscopy and urine analysis did not show significant RBC, protein creatinine ratio did not show significant proteinuria.   Next clinic visit in July 2021 showed elevated creatinine and proteinuria. Started on prednisone which lead to improvement in kidney function and proteinuria. Requested patient to get a kidney biopsy, however she was hesitant. Re-discussed about Benlysta and kidney biopsy during subsequent visits patient did not want them. Also since July 2021 to April 2022 (10 months duration) she only filled 6 months worth of medication.   Patient declined Biopsy and Benlysta/Volcosporin.    Acid Base, electrolytes, Hemoglobin, Bone Mineral in acceptable range.    - Continue Cellcept 3 Gr/day, Changing plaquenil to 200 mg daily (continues to miss a lot of doses)  - Prednisone at 2.5 mg daily, Vitamin D improved a little, (Reinforced compliance and sent it again)  - Requested to see eye doctor (reports seeing one few months ago, outside [facility], also informed daughter in previous visits)  - Pt and daughter aware that Lupus is not fully controlled, and it is unlikely to get controlled without absolute compliance with medications +/- adding Benlysta/Voclosporin.  - Continue to hold lisinopril for now.  - Avoid NSAIDs intake  - Re-counselled about being compliant with medications  - Reminded to keep uptodate with cancer screening and vaccines.    RTC in 3 months    Diagnosis and plan of care explained to the patient and daughter (on phone) at length.  Verbalized understanding. Answered all questions.  Thanks for allowing me to participate in the care of this patient.     1:47 PM    Franko Faria MD  Nephrology & Critical Care    32 minutes of total time spent on the encounter, which includes face to face time and non-face to face time preparing to see the patient (eg, review of tests), Obtaining and/or reviewing separately obtained history, documenting clinical  information in the electronic or other health record, independently interpreting results (not separately reported) and communicating results to the patient/family/caregiver, or Care coordination (not separately reported).

## 2023-07-29 ENCOUNTER — HOSPITAL ENCOUNTER (EMERGENCY)
Facility: OTHER | Age: 48
Discharge: HOME | End: 2023-07-29
Attending: EMERGENCY MEDICINE
Payer: MEDICARE

## 2023-07-29 VITALS
BODY MASS INDEX: 21.26 KG/M2 | TEMPERATURE: 98 F | RESPIRATION RATE: 20 BRPM | OXYGEN SATURATION: 96 % | HEIGHT: 63 IN | DIASTOLIC BLOOD PRESSURE: 82 MMHG | HEART RATE: 65 BPM | SYSTOLIC BLOOD PRESSURE: 130 MMHG | WEIGHT: 120 LBS

## 2023-07-29 DIAGNOSIS — M54.9 UPPER BACK PAIN: Primary | ICD-10-CM

## 2023-07-29 DIAGNOSIS — R07.89 ATYPICAL CHEST PAIN: ICD-10-CM

## 2023-07-29 PROBLEM — I63.9 ISCHEMIC STROKE: Status: RESOLVED | Noted: 2021-04-01 | Resolved: 2023-07-29

## 2023-07-29 PROBLEM — N17.9 AKI (ACUTE KIDNEY INJURY): Status: RESOLVED | Noted: 2021-04-02 | Resolved: 2023-07-29

## 2023-07-29 PROBLEM — D64.9 ANEMIA OF UNKNOWN ETIOLOGY: Status: RESOLVED | Noted: 2021-06-10 | Resolved: 2023-07-29

## 2023-07-29 PROBLEM — N39.0 UTI (URINARY TRACT INFECTION): Status: RESOLVED | Noted: 2021-04-02 | Resolved: 2023-07-29

## 2023-07-29 PROCEDURE — 93005 ELECTROCARDIOGRAM TRACING: CPT

## 2023-07-29 PROCEDURE — 93010 EKG 12-LEAD: ICD-10-PCS | Mod: 76,,, | Performed by: INTERNAL MEDICINE

## 2023-07-29 PROCEDURE — 99284 EMERGENCY DEPT VISIT MOD MDM: CPT

## 2023-07-29 PROCEDURE — 93010 ELECTROCARDIOGRAM REPORT: CPT | Mod: 76,,, | Performed by: INTERNAL MEDICINE

## 2023-07-29 PROCEDURE — 25000003 PHARM REV CODE 250: Performed by: EMERGENCY MEDICINE

## 2023-07-29 RX ORDER — NAPROXEN 500 MG/1
500 TABLET ORAL
Status: COMPLETED | OUTPATIENT
Start: 2023-07-29 | End: 2023-07-29

## 2023-07-29 RX ORDER — DICLOFENAC SODIUM 10 MG/G
2 GEL TOPICAL 3 TIMES DAILY PRN
Qty: 100 G | Refills: 0 | Status: SHIPPED | OUTPATIENT
Start: 2023-07-29 | End: 2023-10-11 | Stop reason: ALTCHOICE

## 2023-07-29 RX ORDER — LIDOCAINE 50 MG/G
PATCH TOPICAL
Qty: 30 PATCH | Refills: 0 | Status: SHIPPED | OUTPATIENT
Start: 2023-07-29 | End: 2023-10-11 | Stop reason: ALTCHOICE

## 2023-07-29 RX ORDER — NAPROXEN 500 MG/1
500 TABLET ORAL 2 TIMES DAILY PRN
Qty: 28 TABLET | Refills: 0 | Status: SHIPPED | OUTPATIENT
Start: 2023-07-29 | End: 2023-08-12

## 2023-07-29 RX ORDER — METHOCARBAMOL 750 MG/1
1500 TABLET, FILM COATED ORAL 3 TIMES DAILY PRN
Qty: 30 TABLET | Refills: 0 | Status: SHIPPED | OUTPATIENT
Start: 2023-07-29 | End: 2023-08-03

## 2023-07-29 RX ORDER — METHOCARBAMOL 750 MG/1
1500 TABLET, FILM COATED ORAL
Status: COMPLETED | OUTPATIENT
Start: 2023-07-29 | End: 2023-07-29

## 2023-07-29 RX ADMIN — NAPROXEN 500 MG: 500 TABLET ORAL at 02:07

## 2023-07-29 RX ADMIN — METHOCARBAMOL 1500 MG: 750 TABLET ORAL at 02:07

## 2023-07-29 NOTE — ED PROVIDER NOTES
"  Source of History:  Medical record, patient    Chief complaint:  Per triage note: "Headache (Pain to the mid- upper back and neck x 2 days, pt now adds chest pain to complaint)  "    HPI:    Patient presents neck pain for the past 2 days.  Pain is not improved or worsened by anything.  She is not taken any analgesics. She denies any heavy lifting or unusual activities. She denies any head or neck trauma, falls. She denies any focal deficits.     Patient initially denied any associated symptoms, then described an episode of chest pain that lasted for a few seconds at most 2 days ago while at rest sitting up in bed and denies the pain radiating. She denies associated nausea, sweats, fevers or vomiting.     ROS:   See HPI for pertinent Review of Systems  \    Review of patient's allergies indicates:   Allergen Reactions    Sulfamethoxazole-trimethoprim Rash       PMH:  As per HPI and below:  Past Medical History:   Diagnosis Date    History of stroke 05/09/2017    Hypertension 05/09/2017    Immunosuppression due to drug therapy 10/24/2022    SLE (systemic lupus erythematosus)     Vertigo        Past Surgical History:   Procedure Laterality Date    RENAL BIOPSY         Social History     Tobacco Use    Smoking status: Never    Smokeless tobacco: Never   Substance Use Topics    Alcohol use: No    Drug use: No       Physical Exam:      Nursing note and vitals reviewed.  /82 (BP Location: Left arm, Patient Position: Sitting)   Pulse 65   Temp 98.1 °F (36.7 °C) (Oral)   Resp 20   Ht 5' 3" (1.6 m)   Wt 54.4 kg (120 lb)   SpO2 96%   BMI 21.26 kg/m²     Constitutional: AAOx3. Well-developed and well-nourished. No distress. Ticklish on back exam - laughing and squirming.   HENT:   Mouth/Throat: Oropharynx is clear and moist.  Eyes: Pupils are equal, round, and reactive to light. No discharge. Anicteric.  Neck: Normal range of motion. Neck supple. No midline tenderness, stepoffs, or deformities.  Cardiovascular: " Normal rate.  Pulmonary/Chest: Effort normal.  Abdominal: Soft. Bowel sounds normal. No distension and no mass. There is no tenderness. There is no rebound, no guarding.  Musculoskeletal: Normal range of motion. No midline spinal tenderness. No stepoffs or deformities. No paraspinal tenderness and spasm.   Neurological: Alert and oriented to person, place, and time. No gross cranial nerve deficit. Coordination normal. No UE/LE light  touch or strength deficits. Able to do deep knee bend, stand on heels and toes. Normal gait.   Skin: Skin is warm and dry.   Ext: 2+ radial pulses  Psychiatric: Behavior is normal. Judgment normal.        Medical Decision Making / Independent Interpretations / External Records Reviewed:      ED Course as of 07/29/23 0347   Sat Jul 29, 2023   0231 --  EKG Interpretation: I independently reviewed and interpreted EKG which shows normal sinus rhythm at 66 beats per minute, no STEMI, normal intervals.  There are nonspecific ST abnormalities, however interpretation is limited by significant artifact on the tracing.  Will repeat  --   [RC]   0239 --  EKG Interpretation: I independently reviewed and interpreted EKG which shows normal sinus rhythm at 68 beats per minute, no STEMI, no significant acute ST/T abnormalities, normal intervals.  No acute change compared to prior tracing from 2021.  --   [RC]   0240 Patient is a 47-year-old female with lupus who presents with cervical and thoracic back and neck pain for the last 2 days.  No clear inciting factor.  She denies any trauma, focal deficits.  She denies any heavy lifting or unusual activity.  She reports compliance with her medications.  On my exam, patient had full range of motion of her shoulder, neurovascularly intact distally, no midline spinal tenderness, no paraspinal tenderness/spasm (in fact, patient was ticklish and squirming during back exam).   Patient also reported an episode of left-sided anterior chest pain for several seconds  while sitting up last night. Patient denies radiation to both arms, associated vomiting, worsening with exertion, pre/syncope, diaphoresis which would increase the pretest probability of ACS/MI.    Denies travel/immobility, recent procedures / admissions, cough, hemoptysis, LE edema or pain, dyspnea, or fevers.  Low pretest probability of acute pulmonary embolus.  I doubt spinal fracture, cord injury.   I suspect her symptoms are due to musculoskeletal pain.  Her chest pain is extremely atypical, occurred over 24 hours ago.  Repeat EKG which is free of artifact shows no acute changes compared to her previous tracing. [RC]   0250 --  I discussed with pt and/or guardian/caretaker that this evaluation in the ED does not suggest any emergent or life threatening medical condition requiring admission or further immediate intervention or diagnostics. Regardless, an unremarkable evaluation in the ED does not preclude the development or presence of a serious or life threatening condition. Pt was instructed to return for any worsening, new, changed, or concerning symptoms.     I had a detailed discussion with patient regarding findings, plan, return precautions, importance of medication adherence, need to follow-up with a PCP and specialist. All questions answered.     Note was created using voice recognition software. It may have occasional typographical errors not identified and edited despite initial review prior to signing.   [RC]      ED Course User Index  [RC] Juan Olmos MD       I decided to obtain the patient's medical records. I reviewed patient's prior external notes / results: specialist note   .     Medications   methocarbamoL tablet 1,500 mg (has no administration in time range)   naproxen tablet 500 mg (has no administration in time range)            ---  I, Marcia Mckeon, scribed for, and in the presence of, Dr. Olmos. I performed the scribed service and the documentation accurately describes the services  I performed. I attest to the accuracy of the note.     Physician Attestation for Scribe:   I, Juan Olmos MD, reviewed documentation as scribed in my presence, which is both accurate and complete.    Diagnostic Impression:    1. Upper back pain    2. Chest pain         ED Disposition Condition    Discharge Good          Future Appointments   Date Time Provider Department Center   10/11/2023  9:30 AM Franko Faria MD [facility]      ED Prescriptions       Medication Sig Dispense Start Date End Date Auth. Provider    naproxen (NAPROSYN) 500 MG tablet Take 1 tablet (500 mg total) by mouth 2 (two) times daily as needed (pain). 28 tablet 7/29/2023 8/12/2023 Juan Olmos MD    methocarbamoL (ROBAXIN) 750 MG Tab Take 2 tablets (1,500 mg total) by mouth 3 (three) times daily as needed (Muscle spasm pain). 30 tablet 7/29/2023 8/3/2023 Juan Olmos MD    LIDOcaine (LIDODERM) 5 % Apply to affected area as needed for pain for 12 hours, then off for 12 hours. Discard after each use 30 patch 7/29/2023 -- Juan Olmos MD    diclofenac sodium (VOLTAREN) 1 % Gel Apply 2 g topically 3 (three) times daily as needed (joint or muscle pain). Apply 2 grams to affected area 3 times daily as needed 100 g 7/29/2023 -- Juan Olmos MD          Follow-up Information       Follow up With Specialties Details Why Contact [facility] -Primary Care Internal Medicine Schedule an appointment as soon as possible for a visit  For recheck with your primary care doctor [address] [facility] [address]  [phone]                Juan Olmos MD  07/29/23 0354

## 2023-07-29 NOTE — DISCHARGE INSTRUCTIONS
You were seen for your back pain.  At this time, it does not appear your pain is from a dangerous cause.     You have injured the muscles (strain) or ligaments (sprain) around the spine. Muscle spasm is often present and adds to the pain.     Do your activities as tolerated. Bedrest will probably make your back pain worse.  Take NSAIDs regularly over the next 1-2 days. Do not exceed the maximum recommended daily dose.     Take all your medications exactly as prescribed.  Call your primary care provider to make the first available appointment.     A back sprain or muscle strain usually gets better in 2-3 weeks. If pain continues and does not respond to medical treatment after 3-4 weeks contact your primary care doctor or return to the ER.     Do not drive or operate heavy machinery while taking valium, lortab, percocet or other sedating medications. Prolonged or overuse of drugs prescribed for pain, sedation or muscle relaxation may lead to addiction, dependence, family problems, legal problems, organ failure, death.      RETURN TO THE ER if any of the following occur:    Pain becomes worse or spreads into your arms or legs   Weakness, numbness or pain in one or both arms or legs   Loss of bowel or bladder control   Numbness in the groin area   Difficulty walking  New or worse pain: if it feels different, becomes more severe, lasts longer, or begins to spread into your shoulder, arm, neck, jaw or back   Shortness of breath or increased pain with breathing   Cough with dark colored sputum (phlegm) or blood   Weakness, dizziness, fainting, falling out, or loss of consciousness   Fever of 100.4ºF (38ºC) or higher  Any new or concerning symptoms

## 2023-08-12 ENCOUNTER — HOSPITAL ENCOUNTER (EMERGENCY)
Facility: OTHER | Age: 48
Discharge: HOME | End: 2023-08-12
Attending: EMERGENCY MEDICINE
Payer: MEDICARE

## 2023-08-12 VITALS
HEART RATE: 78 BPM | OXYGEN SATURATION: 98 % | TEMPERATURE: 98 F | SYSTOLIC BLOOD PRESSURE: 137 MMHG | BODY MASS INDEX: 21.79 KG/M2 | WEIGHT: 123 LBS | RESPIRATION RATE: 16 BRPM | HEIGHT: 63 IN | DIASTOLIC BLOOD PRESSURE: 84 MMHG

## 2023-08-12 DIAGNOSIS — L03.012 PARONYCHIA OF FINGER, LEFT: Primary | ICD-10-CM

## 2023-08-12 PROCEDURE — 99283 EMERGENCY DEPT VISIT LOW MDM: CPT

## 2023-08-12 PROCEDURE — 25000003 PHARM REV CODE 250: Performed by: EMERGENCY MEDICINE

## 2023-08-12 RX ORDER — CEPHALEXIN 500 MG/1
500 CAPSULE ORAL
Status: COMPLETED | OUTPATIENT
Start: 2023-08-12 | End: 2023-08-12

## 2023-08-12 RX ORDER — CEPHALEXIN 500 MG/1
500 CAPSULE ORAL EVERY 8 HOURS
Qty: 15 CAPSULE | Refills: 0 | Status: SHIPPED | OUTPATIENT
Start: 2023-08-12 | End: 2023-08-17

## 2023-08-12 RX ADMIN — CEPHALEXIN 500 MG: 500 CAPSULE ORAL at 09:08

## 2023-08-13 NOTE — FIRST PROVIDER EVALUATION
Emergency Department TeleTriage Encounter Note      CHIEF COMPLAINT    Chief Complaint   Patient presents with    Hand Pain     C/o left hand , 4th digit pain / swelling x 1 wk . Denies any injury        VITAL SIGNS   Initial Vitals [08/12/23 1923]   BP Pulse Resp Temp SpO2   (!) 141/83 78 16 98.4 °F (36.9 °C) 98 %      MAP       --            ALLERGIES    Review of patient's allergies indicates:   Allergen Reactions    Sulfamethoxazole-trimethoprim Rash       PROVIDER TRIAGE NOTE  Patient with paronychia to left 4th finger      ORDERS  Labs Reviewed - No data to display    ED Orders (720h ago, onward)      None              Virtual Visit Note: The provider triage portion of this emergency department evaluation and documentation was performed via [employer], a HIPAA-compliant telemedicine application, in concert with a tele-presenter in the room. A face to face patient evaluation with one of my colleagues will occur once the patient is placed in an emergency department room.      DISCLAIMER: This note was prepared with [employer] voice recognition transcription software. Garbled syntax, mangled pronouns, and other bizarre constructions may be attributed to that software system.

## 2023-08-13 NOTE — ED PROVIDER NOTES
Encounter Date: 8/12/2023    SCRIBE #1 NOTE: I, Marisela Bee, am scribing for, and in the presence of,  Kera Underwood MD. I have scribed the following portions of the note - Other sections scribed: HPI,ROS,and PE.       History     Chief Complaint   Patient presents with    Hand Pain     C/o left hand , 4th digit pain / swelling x 1 wk . Denies any injury      48 year-old female, with a PMHx of SLE and anemia presents with a complaint of left 4th finger pain. Onset of pain was approximately one week ago. Associated symptoms include arthralgias, but denies SOB, chest pain, or fever. Pain is rated a 9 out of 10 in severity. Patient reports she had gel nail polish on fingernails which she removed herself when she began to experience pain in her cuticle area. She notes treatment at home by soaking finger in warm water, peroxide, and using neosporin but has not had improvement. Pt denies taking any medication for pain. Of note, patient is right handed and currently taking several prescribed medications for her lupus. This is the extent of the patient's complaints at this time.    The history is provided by the patient and medical records. No  was used.     Review of patient's allergies indicates:   Allergen Reactions    Sulfamethoxazole-trimethoprim Rash     Past Medical History:   Diagnosis Date    History of stroke 05/09/2017    Hypertension 05/09/2017    Immunosuppression due to drug therapy 10/24/2022    SLE (systemic lupus erythematosus)     Vertigo      Past Surgical History:   Procedure Laterality Date    RENAL BIOPSY       Family History   Problem Relation Age of Onset    Diabetes Mother     Cancer Sister         Breast    Stroke Neg Hx     Heart attack Neg Hx      Social History     Tobacco Use    Smoking status: Never    Smokeless tobacco: Never   Substance Use Topics    Alcohol use: No    Drug use: No     Review of Systems   Constitutional:  Negative for chills and fever.   HENT:   Negative for congestion and sore throat.    Eyes:  Negative for visual disturbance.   Respiratory:  Negative for cough and shortness of breath.    Cardiovascular:  Negative for chest pain and palpitations.   Gastrointestinal:  Negative for abdominal pain, diarrhea and vomiting.   Genitourinary:  Negative for decreased urine volume, dysuria and vaginal discharge.   Musculoskeletal:  Positive for arthralgias. Negative for joint swelling, neck pain and neck stiffness.   Skin:  Positive for color change. Negative for rash and wound.   Neurological:  Negative for weakness, numbness and headaches.   Psychiatric/Behavioral:  Negative for confusion.        Physical Exam     Initial Vitals [08/12/23 1923]   BP Pulse Resp Temp SpO2   (!) 141/83 78 16 98.4 °F (36.9 °C) 98 %      MAP       --         Physical Exam    Nursing note and vitals reviewed.  Constitutional: She appears well-developed and well-nourished. No distress.   HENT:   Head: Normocephalic and atraumatic.   Mouth/Throat: Oropharynx is clear and moist. No oropharyngeal exudate.   Eyes: Conjunctivae and EOM are normal. Pupils are equal, round, and reactive to light.   Neck: Neck supple.   Normal range of motion.  Cardiovascular:  Normal rate and normal heart sounds.           No murmur heard.  Pulmonary/Chest: Breath sounds normal. No respiratory distress. She has no wheezes. She has no rhonchi. She has no rales.   Abdominal: Abdomen is soft. There is no abdominal tenderness. There is no rebound and no guarding.   Musculoskeletal:         General: No tenderness or edema.      Cervical back: Normal range of motion and neck supple.      Comments: Paronychia present on left 4th finger.     Neurological: She is alert and oriented to person, place, and time. She has normal strength. GCS score is 15. GCS eye subscore is 4. GCS verbal subscore is 5. GCS motor subscore is 6.   Skin: Skin is warm and dry. No rash noted. There is erythema (Left 4th finger at nail margin).    Psychiatric: She has a normal mood and affect. Thought content normal.         ED Course   Procedures  Labs Reviewed - No data to display       Imaging Results    None          Medications   cephALEXin capsule 500 mg (500 mg Oral Given 8/12/23 2101)     Medical Decision Making:   History:   Old Medical Records: I decided to obtain old medical records.  ED Management:  Urgent evaluation a 48-year-old female who presents with a paronychia to the non dominant 4th finger.  She has no fever systemic symptoms, but is significantly immune compromise due to medications treating lupus.  Vital signs are benign, afebrile.  Exam reveals a simple paronychia.  Patient would not agree to incision and drainage, but finger was soaked and cleaned, and she is treated with cephalexin.  She is discharged in good condition with prescription for cephalexin, advised OTC Tylenol or ibuprofen for pain.  She is encouraged to return if symptoms worsen or fail to improve for I&D.  She is discharged in good condition.          Scribe Attestation:   Scribe #1: I performed the above scribed service and the documentation accurately describes the services I performed. I attest to the accuracy of the note.    Physician Attestation for Scribe: I, Kera Underwood, reviewed documentation as scribed in my presence, which is both accurate and complete.                   Clinical Impression:   Final diagnoses:  [L03.012] Paronychia of finger, left (Primary)        ED Disposition Condition    Discharge Stable          ED Prescriptions       Medication Sig Dispense Start Date End Date Auth. Provider    cephALEXin (KEFLEX) 500 MG capsule Take 1 capsule (500 mg total) by mouth every 8 (eight) hours. for 5 days 15 capsule 8/12/2023 8/17/2023 Kera Underwood MD          Follow-up Information       Follow up With Specialties Details Why Contact Info    Your regular primary care doctor  Schedule an appointment as soon as possible for a visit  For symptom recheck  and close follow-up     [facility] - Emergency Dept Emergency Medicine  As needed, If symptoms worsen [address]  [facility] [id number] [phone]             Kera Underwood MD  08/12/23 9075

## 2023-08-13 NOTE — ED NOTES
Pt given discharge paperwork without any questions - pt discharged with family and prescriptions - pt discharged without incident

## 2023-08-13 NOTE — ED TRIAGE NOTES
Babak Arevalo, a 48 y.o. female presents to the ED w/ complaint of left fourth finger swelling around cuticle x 1 week    Triage note:  Chief Complaint   Patient presents with    Hand Pain     C/o left hand , 4th digit pain / swelling x 1 wk . Denies any injury      Review of patient's allergies indicates:   Allergen Reactions    Sulfamethoxazole-trimethoprim Rash     Past Medical History:   Diagnosis Date    History of stroke 05/09/2017    Hypertension 05/09/2017    Immunosuppression due to drug therapy 10/24/2022    SLE (systemic lupus erythematosus)     Vertigo       LOC: The patient is awake, alert, aware of environment with an appropriate affect. Oriented x4, speaking appropriately  APPEARANCE: Pt resting comfortably, in no acute distress, pt is clean and well groomed, clothing properly fastened  SKIN:The skin is warm and dry, color consistent with ethnicity, patient has normal skin turgor and moist mucus membranes, no bruising noted   RESPIRATORY:Airway is open and patent, respirations are spontaneous, patient has a normal effort and rate, no accessory muscle use noted.  CARDIAC: Normal rate and rhythm, no peripheral edema noted  ABDOMEN: Soft, non tender, non distended.  NEUROLOGIC: PERRLA, facial expression is symmetrical, patient moving all extremities spontaneously, normal sensation in all extremities when touched with a finger.  Follows all commands appropriately  MUSCULOSKELETAL: Patient moving all extremities spontaneously, no obvious swelling or deformities noted. Minimal swelling noted to cuticle area of left 4th finger

## 2023-08-16 ENCOUNTER — PATIENT OUTREACH (OUTPATIENT)
Dept: EMERGENCY MEDICINE | Facility: OTHER | Age: 48
End: 2023-08-16
Payer: MEDICAID

## 2023-08-16 NOTE — PROGRESS NOTES
Ambika De Jesus LPN  ED Navigator  Emergency Department    [place] [facility] [place]  Role: Community Health Worker    Date: 08/16/2023  Patient Name: Babak Arevalo  MRN: [id number]  [facility]    Assessment:     Babak Arevalo is a 48 y.o. female who has presented to ED for Hand Pain. Patient has visited the ED 2 times in the past 3 months. Patient did not contact PCP.     ED Navigator Initial Assessment    ED Navigator Enrollment Documentation  Consent to Services  Does patient consent to completing the assessment?: Yes  Contact  Method of Initial Contact: Phone  Transportation  Does the patient have issues with Transportation?: No  Does the patient have transportation to and from healthcare appointments?: Yes  Insurance Coverage  Do you have coverage/adequate coverage?: Yes  Type/kind of coverage: Medicaid  Is patient able to afford co-pays/deductibles?: Yes  Is patient able to afford HME or supplies?: Yes  Does patient have an established [facility] PCP?: Yes  Able to access?: Yes  Does the patient have a lack of adequate coverage?: No  Specialist Appointment  Did the patient come to the ED to see a specialist?: No  Does the patient have a pending specialist referral?: No  Does the patient have a specialist appointment made?: No  PCP Follow Up Appointment  Has the patient had an appointment with a primary care provider in the past year?: Yes  Approximate date: 12/6/22  Provider: Elle Mcgarry MD  Does the patient have a follow up appontment with a PCP?: Yes  Upcoming appointment date: 8/17/23  [facility]  When was the last time you saw your PCP?: 12/6/22  Medications  Is patient able to afford medication?: Yes  Is patient unable to get medication due to lack of transportation?: No  Psychological  Does the patient have psycho-social concerns?: Yes  What concerns does the patient have?: Anxiety and/or Depression  Food  Does the patient have  concerns about food?: No  Communication/Education  Does the patient have limited English proficiency/English not primary language?: No  Does patient have low literacy and/or low health literacy?: Yes  Does patient have concerns with care?: No  Does patient have dissatisfaction with care?: No  Other Financial Concerns  Does the patient have immediate financial distress?: No  Does the patient have general financial concerns?: Yes  Other Social Barriers/Concerns  Does the patient have any additional barriers or concerns?: Unable to afford utilities  Primary Barrier  Barriers identified: Cognitive barrier (health literacy, language and communication, etc.)  Root Cause of ED Utilization: Patient Knowledge/Low Health Literacy  Plan to address Patient Knowledge/Low Health Literacy: Provided information for [facility] On [facility] 24/7 Nurse triage line [phone] or 1-866-[facility] ([phone])  Next steps: Provided Education, Scheduled Appointment/Referral  Scheduled Appointment Date: 10/11/23  Appointment Reminder Date: 10/9/23  Was education/educational materials provided surrounding PCP services/creating a medical home?: Yes Was education verbal or written?: Written     Was education/educational materials provided surrounding low cost, healthy foods?: Yes Was education verbal or written?: Written     Was education/educational materials provided surrounding other items? If so, use comment to explain.: Yes (Comment: Utility assistance, behavioral health resources) Was education verbal or written?: Written   Plan: Utility payment assistance resource given for their region  Expected Date of Follow Up 1: 10/9/23  Additional Documentation: Spoke with patient today and she was agreeable to enrollment and subsequent F/U calls. Pt. Would like behavioral health resource options. Pt. Is established with [facility] and she stated she has an appointment tomorrow 8/17/23. Pt stated her hand/finger is also feeling better and she  was able to get RX from pharmacy without any difficulty. Pt. Denies smoking. Pt. Would like resources for utility assistance. [facility] form, [place] Virtual Visit Flyer, [facility] PCP scheduling assistance, [facility] on call RN#, and Heart Healthy Diet education all sent via mail as well.         Social History     Socioeconomic History    Marital status: Single   Tobacco Use    Smoking status: Never    Smokeless tobacco: Never   Substance and Sexual Activity    Alcohol use: No    Drug use: No     Social Determinants of Health     Financial Resource Strain: Medium Risk (8/16/2023)    Overall Financial Resource Strain (CARDIA)     Difficulty of Paying Living Expenses: Somewhat hard   Food Insecurity: No Food Insecurity (8/16/2023)    Hunger Vital Sign     Worried About Running Out of Food in the Last Year: Never true     Ran Out of Food in the Last Year: Never true   Transportation Needs: No Transportation Needs (8/16/2023)    PRAPARE - Transportation     Lack of Transportation (Medical): No     Lack of Transportation (Non-Medical): No   Stress: No Stress Concern Present (8/16/2023)    [employer] - Occupational Stress Questionnaire     Feeling of Stress : Only a little   Social Connections: Unknown (8/16/2023)    Social Connection and Isolation Panel [NHANES]     Marital Status: Never    Housing Stability: Unknown (8/16/2023)    Housing Stability Vital Sign     Unable to Pay for Housing in the Last Year: No     Unstable Housing in the Last Year: No       Plan:   Spoke with patient today and she was agreeable to enrollment and subsequent F/U calls. Pt. Would like behavioral health resource options. Pt. Is established with [facility] and she stated she has an appointment tomorrow 8/17/23. Pt stated her hand/finger is also feeling better and she was able to get RX from pharmacy without any difficulty. Pt. Denies smoking. Pt. Would like resources for utility assistance. [facility] form, [place] Virtual Visit Flyer, [facility] PCP scheduling assistance, [facility] on call RN#, and Heart Healthy Diet education all sent via mail as well.

## 2023-09-06 ENCOUNTER — HOSPITAL ENCOUNTER (EMERGENCY)
Facility: OTHER | Age: 48
Discharge: HOME | End: 2023-09-06
Attending: EMERGENCY MEDICINE
Payer: MEDICARE

## 2023-09-06 VITALS
BODY MASS INDEX: 21.85 KG/M2 | OXYGEN SATURATION: 98 % | DIASTOLIC BLOOD PRESSURE: 75 MMHG | HEART RATE: 74 BPM | WEIGHT: 128 LBS | HEIGHT: 64 IN | RESPIRATION RATE: 18 BRPM | SYSTOLIC BLOOD PRESSURE: 118 MMHG | TEMPERATURE: 99 F

## 2023-09-06 DIAGNOSIS — L03.012 PARONYCHIA OF FINGER OF LEFT HAND: Primary | ICD-10-CM

## 2023-09-06 PROCEDURE — 99282 EMERGENCY DEPT VISIT SF MDM: CPT

## 2023-09-07 NOTE — DISCHARGE INSTRUCTIONS
You were seen in the ER for evaluation of finger pain. You were diagnosed with a paronychia, which is a small infection surrounding your nail.  Soak finger in warm water multiple times daily, and after soaking your finger ensure that you push back the cuticle as far as it can go and lift up on the nail to facilitate drainage from the area.  You may take Tylenol as needed for pain.  If you have worsening pain, swelling, or redness, or if you develop any new or worsening symptoms, return to the ER or to your primary care provider for further treatment.

## 2023-09-07 NOTE — FIRST PROVIDER EVALUATION
Emergency Department TeleTriage Encounter Note      CHIEF COMPLAINT    Chief Complaint   Patient presents with    Hand Pain     C/o left 4th digit pain 7/10/swelling and clear drainage that began 07/2023 s/t manicure. Stated seen in ED 08/12 for same complaint with no improvement. Swelling noted with no drainage. VSS       VITAL SIGNS   Initial Vitals [09/06/23 1911]   BP Pulse Resp Temp SpO2   117/61 88 18 98.1 °F (36.7 °C) 100 %      MAP       --            ALLERGIES    Review of patient's allergies indicates:   Allergen Reactions    Sulfamethoxazole-trimethoprim Rash       PROVIDER TRIAGE NOTE  Patient with worsening paronychia. She has been using meds but swelling is worse.       ORDERS  Labs Reviewed - No data to display    ED Orders (720h ago, onward)      None              Virtual Visit Note: The provider triage portion of this emergency department evaluation and documentation was performed via [employer], a HIPAA-compliant telemedicine application, in concert with a tele-presenter in the room. A face to face patient evaluation with one of my colleagues will occur once the patient is placed in an emergency department room.      DISCLAIMER: This note was prepared with [employer] voice recognition transcription software. Garbled syntax, mangled pronouns, and other bizarre constructions may be attributed to that software system.

## 2023-09-07 NOTE — ED PROVIDER NOTES
"  Source of History:  Patient    Chief complaint:  Hand Pain (C/o left 4th digit pain 7/10/swelling and clear drainage that began 07/2023 s/t manicure. Stated seen in ED 08/12 for same complaint with no improvement. Swelling noted with no drainage. VSS)      HPI:  Babak Arevalo is a 48 y.o. female presenting with  left 4th digit pain and swelling x3 weeks.  Patient reports that she was seen in the ER 3 weeks ago for the same symptoms.  States that she was sent home with antibiotics, and her symptoms improved minimally.  States that she has pain and swelling to the distal aspect of her left 4th digit surrounding her nailbed.  Reports that the area has been draining a white discharge when she soaks it.  Denies any radiating pain to the rest of her hand.  Denies any new injury or trauma.  Denies any fever, chills, nausea, vomiting.    This is the extent to the patients complaints today here in the emergency department.    ROS: As per HPI    Review of patient's allergies indicates:   Allergen Reactions    Sulfamethoxazole-trimethoprim Rash       PMH:  As per HPI and below:  Past Medical History:   Diagnosis Date    History of stroke 05/09/2017    Hypertension 05/09/2017    Immunosuppression due to drug therapy 10/24/2022    SLE (systemic lupus erythematosus)     Vertigo      Past Surgical History:   Procedure Laterality Date    RENAL BIOPSY         Social History     Tobacco Use    Smoking status: Never    Smokeless tobacco: Never   Substance Use Topics    Alcohol use: No    Drug use: No       Physical Exam:    /61 (BP Location: Left arm, Patient Position: Sitting)   Pulse 88   Temp 98.1 °F (36.7 °C) (Oral)   Resp 18   Ht 5' 4" (1.626 m)   Wt 58.1 kg (128 lb)   SpO2 100%   Breastfeeding No   BMI 21.97 kg/m²   Nursing note and vital signs reviewed.  Constitutional: Pleasant, answering questions appropriately. No acute distress.  Eyes: No conjunctival injection.  Extraocular muscles are intact.  ENT: Normal " phonation.  Chest: No respiratory distress.  Cardiovascular: Regular rate. Bilateral 2+ radial pulses.  Musculoskeletal: Full active range of motion of left fourth digit, full extension and flexion intact isolated at both DIP and PIP. No bony tenderness.   Skin: Mild swelling present surrounding nailbed of left fourth digit, worse in the medial aspect, no significant area of fluctuance, mild overlying erythema. Capillary refill < 2 seconds, distal finger neurovascularly intact. No rashes seen.  Good turgor.  No abrasions.  Psych: Appropriate, conversant.        I decided to obtain the patient's medical records.    Imaging Results    None         MDM:    48 y.o. female with Pmh of lupus, hypertension, h/o stroke presenting for evaluation of pain and swelling to left fourth digit nailbed x 3 weeks. Patient afebrile and HD stable. Well appearing, nontoxic. Exam consistent with paronychia. No indication of extending infection to remainder of finger. No indication of systemic infection, sepsis. Patient seen here and diagnosed with paronychia on 8/12, declined I&D, was given Keflex and recommendations for conservative treatment. Patient reports she has been soaking the finger daily and does note some white drainage present when she soaks finger. Symptoms have improved slightly but have not resolved with once daily finger soaks and Keflex. Patient declined I&D again today. No severe swelling or fluctuance, paronychia isolated to nailbed, no extension to remainder of digit, no indication for repeat course of antibiotics. Given that paronychia is draining spontaneously and patient does not wish to have I&D performed, discussed continued conservative treatment with patient. Advised patient on being more aggressive with home treatments, soaking finger in warm water multiple times a day, techniques to use to facilitate drainage of paronychia. Advised OTC tylenol as needed for pain. Patient verbalized understanding. Patient has   a PCP she can follow up with, advised if symptoms worsen or fail to improve she may follow up with PCP or return here. Patient stable for discharge, return precautions given, patient verbalized understanding of and agreement with discharge plan.                  Diagnostic Impression:    1. Paronychia of finger of left hand         ED Disposition Condition    Discharge Stable            ED Prescriptions    None       Follow-up Information       Follow up With Specialties Details Why Contact [facility] Internal Medicine Schedule an appointment as soon as possible for a visit in 1 week  [address] [facility] [facility] [address]  [phone]      [facility] Emergency Dept Emergency Medicine Go to  If symptoms worsen [address] [address] [id number] [phone]            Please pardon typos or dictation errors, as this note was transcribed using M*Modal fluency direct dictation software.      Eli Mora PAMIGDALIA  09/07/23 1442

## 2023-09-07 NOTE — ED TRIAGE NOTES
Hand Pain (C/o left 4th digit pain 7/10/swelling and clear drainage that began 07/2023 s/t manicure. Stated seen in ED 08/12 for same complaint with no improvement. Swelling noted with no drainage. VSS)

## 2023-10-09 ENCOUNTER — PATIENT OUTREACH (OUTPATIENT)
Dept: EMERGENCY MEDICINE | Facility: HOSPITAL | Age: 48
End: 2023-10-09

## 2023-10-11 ENCOUNTER — OFFICE VISIT (OUTPATIENT)
Dept: NEPHROLOGY | Facility: CLINIC | Age: 48
End: 2023-10-11
Payer: MEDICARE

## 2023-10-11 ENCOUNTER — LAB VISIT (OUTPATIENT)
Dept: LAB | Facility: HOSPITAL | Age: 48
End: 2023-10-11
Payer: MEDICARE

## 2023-10-11 VITALS
DIASTOLIC BLOOD PRESSURE: 76 MMHG | SYSTOLIC BLOOD PRESSURE: 108 MMHG | BODY MASS INDEX: 21.57 KG/M2 | HEART RATE: 77 BPM | WEIGHT: 125.69 LBS

## 2023-10-11 DIAGNOSIS — M32.9 SYSTEMIC LUPUS ERYTHEMATOSUS, UNSPECIFIED SLE TYPE, UNSPECIFIED ORGAN INVOLVEMENT STATUS: ICD-10-CM

## 2023-10-11 DIAGNOSIS — M32.9 SYSTEMIC LUPUS ERYTHEMATOSUS, UNSPECIFIED SLE TYPE, UNSPECIFIED ORGAN INVOLVEMENT STATUS: Primary | ICD-10-CM

## 2023-10-11 DIAGNOSIS — M32.14 LUPUS NEPHRITIS: ICD-10-CM

## 2023-10-11 DIAGNOSIS — I63.9 ISCHEMIC STROKE: ICD-10-CM

## 2023-10-11 DIAGNOSIS — Z79.899 IMMUNOSUPPRESSION DUE TO DRUG THERAPY: ICD-10-CM

## 2023-10-11 DIAGNOSIS — D84.821 IMMUNOSUPPRESSION DUE TO DRUG THERAPY: ICD-10-CM

## 2023-10-11 LAB
25(OH)D3+25(OH)D2 SERPL-MCNC: 12 NG/ML (ref 30–96)
ALBUMIN SERPL BCP-MCNC: 3.3 G/DL (ref 3.5–5.2)
ANION GAP SERPL CALC-SCNC: 10 MMOL/L (ref 8–16)
BASOPHILS # BLD AUTO: 0.02 K/UL (ref 0–0.2)
BASOPHILS NFR BLD: 0.5 % (ref 0–1.9)
BUN SERPL-MCNC: 12 MG/DL (ref 6–20)
CALCIUM SERPL-MCNC: 9 MG/DL (ref 8.7–10.5)
CHLORIDE SERPL-SCNC: 107 MMOL/L (ref 95–110)
CO2 SERPL-SCNC: 25 MMOL/L (ref 23–29)
CREAT SERPL-MCNC: 0.8 MG/DL (ref 0.5–1.4)
DIFFERENTIAL METHOD: ABNORMAL
EOSINOPHIL # BLD AUTO: 0 K/UL (ref 0–0.5)
EOSINOPHIL NFR BLD: 0.2 % (ref 0–8)
ERYTHROCYTE [DISTWIDTH] IN BLOOD BY AUTOMATED COUNT: 14.7 % (ref 11.5–14.5)
EST. GFR  (NO RACE VARIABLE): >60 ML/MIN/1.73 M^2
GLUCOSE SERPL-MCNC: 80 MG/DL (ref 70–110)
HCT VFR BLD AUTO: 32.6 % (ref 37–48.5)
HGB BLD-MCNC: 9.9 G/DL (ref 12–16)
IMM GRANULOCYTES # BLD AUTO: 0.01 K/UL (ref 0–0.04)
IMM GRANULOCYTES NFR BLD AUTO: 0.2 % (ref 0–0.5)
LYMPHOCYTES # BLD AUTO: 1.2 K/UL (ref 1–4.8)
LYMPHOCYTES NFR BLD: 27.8 % (ref 18–48)
MCH RBC QN AUTO: 25.4 PG (ref 27–31)
MCHC RBC AUTO-ENTMCNC: 30.4 G/DL (ref 32–36)
MCV RBC AUTO: 84 FL (ref 82–98)
MONOCYTES # BLD AUTO: 0.5 K/UL (ref 0.3–1)
MONOCYTES NFR BLD: 10.9 % (ref 4–15)
NEUTROPHILS # BLD AUTO: 2.6 K/UL (ref 1.8–7.7)
NEUTROPHILS NFR BLD: 60.4 % (ref 38–73)
NRBC BLD-RTO: 0 /100 WBC
PHOSPHATE SERPL-MCNC: 3.9 MG/DL (ref 2.7–4.5)
PLATELET # BLD AUTO: 175 K/UL (ref 150–450)
PMV BLD AUTO: 11.2 FL (ref 9.2–12.9)
POTASSIUM SERPL-SCNC: 4.2 MMOL/L (ref 3.5–5.1)
PTH-INTACT SERPL-MCNC: 76.3 PG/ML (ref 9–77)
RBC # BLD AUTO: 3.9 M/UL (ref 4–5.4)
SODIUM SERPL-SCNC: 142 MMOL/L (ref 136–145)
WBC # BLD AUTO: 4.32 K/UL (ref 3.9–12.7)

## 2023-10-11 PROCEDURE — 3008F BODY MASS INDEX DOCD: CPT | Mod: CPTII,S$GLB,, | Performed by: INTERNAL MEDICINE

## 2023-10-11 PROCEDURE — 83970 ASSAY OF PARATHORMONE: CPT | Performed by: INTERNAL MEDICINE

## 2023-10-11 PROCEDURE — 1160F PR REVIEW ALL MEDS BY PRESCRIBER/CLIN PHARMACIST DOCUMENTED: ICD-10-PCS | Mod: CPTII,S$GLB,, | Performed by: INTERNAL MEDICINE

## 2023-10-11 PROCEDURE — 82306 VITAMIN D 25 HYDROXY: CPT | Performed by: INTERNAL MEDICINE

## 2023-10-11 PROCEDURE — 1160F RVW MEDS BY RX/DR IN RCRD: CPT | Mod: CPTII,S$GLB,, | Performed by: INTERNAL MEDICINE

## 2023-10-11 PROCEDURE — 99215 OFFICE O/P EST HI 40 MIN: CPT | Mod: S$GLB,,, | Performed by: INTERNAL MEDICINE

## 2023-10-11 PROCEDURE — 85025 COMPLETE CBC W/AUTO DIFF WBC: CPT | Performed by: INTERNAL MEDICINE

## 2023-10-11 PROCEDURE — 3008F PR BODY MASS INDEX (BMI) DOCUMENTED: ICD-10-PCS | Mod: CPTII,S$GLB,, | Performed by: INTERNAL MEDICINE

## 2023-10-11 PROCEDURE — 3066F PR DOCUMENTATION OF TREATMENT FOR NEPHROPATHY: ICD-10-PCS | Mod: CPTII,S$GLB,, | Performed by: INTERNAL MEDICINE

## 2023-10-11 PROCEDURE — 86225 DNA ANTIBODY NATIVE: CPT | Performed by: INTERNAL MEDICINE

## 2023-10-11 PROCEDURE — 80069 RENAL FUNCTION PANEL: CPT | Performed by: INTERNAL MEDICINE

## 2023-10-11 PROCEDURE — 3078F DIAST BP <80 MM HG: CPT | Mod: CPTII,S$GLB,, | Performed by: INTERNAL MEDICINE

## 2023-10-11 PROCEDURE — 99215 PR OFFICE/OUTPT VISIT, EST, LEVL V, 40-54 MIN: ICD-10-PCS | Mod: S$GLB,,, | Performed by: INTERNAL MEDICINE

## 2023-10-11 PROCEDURE — 3074F SYST BP LT 130 MM HG: CPT | Mod: CPTII,S$GLB,, | Performed by: INTERNAL MEDICINE

## 2023-10-11 PROCEDURE — 3074F PR MOST RECENT SYSTOLIC BLOOD PRESSURE < 130 MM HG: ICD-10-PCS | Mod: CPTII,S$GLB,, | Performed by: INTERNAL MEDICINE

## 2023-10-11 PROCEDURE — 99999 PR PBB SHADOW E&M-EST. PATIENT-LVL III: ICD-10-PCS | Mod: PBBFAC,,, | Performed by: INTERNAL MEDICINE

## 2023-10-11 PROCEDURE — 3078F PR MOST RECENT DIASTOLIC BLOOD PRESSURE < 80 MM HG: ICD-10-PCS | Mod: CPTII,S$GLB,, | Performed by: INTERNAL MEDICINE

## 2023-10-11 PROCEDURE — 1159F PR MEDICATION LIST DOCUMENTED IN MEDICAL RECORD: ICD-10-PCS | Mod: CPTII,S$GLB,, | Performed by: INTERNAL MEDICINE

## 2023-10-11 PROCEDURE — 99999 PR PBB SHADOW E&M-EST. PATIENT-LVL III: CPT | Mod: PBBFAC,,, | Performed by: INTERNAL MEDICINE

## 2023-10-11 PROCEDURE — 86225 DNA ANTIBODY NATIVE: CPT | Mod: 59 | Performed by: INTERNAL MEDICINE

## 2023-10-11 PROCEDURE — 82610 CYSTATIN C: CPT | Performed by: INTERNAL MEDICINE

## 2023-10-11 PROCEDURE — 1159F MED LIST DOCD IN RCRD: CPT | Mod: CPTII,S$GLB,, | Performed by: INTERNAL MEDICINE

## 2023-10-11 PROCEDURE — 3066F NEPHROPATHY DOC TX: CPT | Mod: CPTII,S$GLB,, | Performed by: INTERNAL MEDICINE

## 2023-10-11 RX ORDER — MYCOPHENOLATE MOFETIL 500 MG/1
1500 TABLET ORAL 2 TIMES DAILY
Qty: 540 TABLET | Refills: 1 | Status: SHIPPED | OUTPATIENT
Start: 2023-10-11 | End: 2023-12-13 | Stop reason: SDUPTHER

## 2023-10-11 RX ORDER — PREDNISONE 2.5 MG/1
2.5 TABLET ORAL DAILY
Qty: 90 TABLET | Refills: 1 | Status: SHIPPED | OUTPATIENT
Start: 2023-10-11 | End: 2023-12-13 | Stop reason: SDUPTHER

## 2023-10-11 RX ORDER — PANTOPRAZOLE SODIUM 20 MG/1
20 TABLET, DELAYED RELEASE ORAL DAILY
Qty: 90 TABLET | Refills: 1 | Status: SHIPPED | OUTPATIENT
Start: 2023-10-11 | End: 2023-12-13 | Stop reason: SDUPTHER

## 2023-10-11 RX ORDER — HYDROXYCHLOROQUINE SULFATE 200 MG/1
200 TABLET, FILM COATED ORAL DAILY
Qty: 90 TABLET | Refills: 1 | Status: SHIPPED | OUTPATIENT
Start: 2023-10-11 | End: 2023-12-13 | Stop reason: SDUPTHER

## 2023-10-11 RX ORDER — ATORVASTATIN CALCIUM 40 MG/1
40 TABLET, FILM COATED ORAL DAILY
Qty: 90 TABLET | Refills: 1 | Status: SHIPPED | OUTPATIENT
Start: 2023-10-11 | End: 2024-02-12 | Stop reason: SDUPTHER

## 2023-10-12 LAB
CYSTATIN C SERPL-MCNC: 1.23 MG/L (ref 0.63–1.03)
GFR/BSA.PRED SERPLBLD CYS-BASED-ARV: 58 ML/MIN/BSA

## 2023-10-13 LAB
DNA TITER: NORMAL
DSDNA AB SER-ACNC: POSITIVE [IU]/ML

## 2023-10-13 NOTE — PROGRESS NOTES
REASON FOR CONSULT/CHIEF COMPLAIN: Acute renal failure, h/o Lupus nephritis    REFERRING PHYSICIAN: [facility]    HISTORY OF PRESENT ILLNESS: 48 y.o. female  patient was referred here for abnormal renal function. Denied chronic NSAID use, no known exposure to lithium, lead.   Early 2000's she was first time diagnosed with Lupus after a kidney biopsy at [facility], the results are not available now. She also had mild/small strokes at that time. She was give some infusions at that time like every two weeks (she thinks its cyclophosphamide). Since then she reportedly has been on Cellcept, Plaquenil and prednisone. She reports that her medications have not been changed. On reviewing the limited records it is unclear what dose of Cellcept she was taking, she has had two cellcept pill bottles at home, one said 500 MG one tablet TID, other said 500 mg three tablets BID. She had BLAYNE with proteinuria in July 2021, wanted to get a kidney biopsy but the patient was hesitant. Gave her high dose prednisone starting July 2021 which improved the BLAYNE and proteinuria, Currently on minimal dose. Seen Rheum who recommended adding Belimumab but the patient did not want to.   Ran out of meds again. Denied any new issues with urination including dysuria, hematuria, urgency, hesitancy, nocturia, incomplete voiding. Denied chest pain, nausea, vomiting, abd pain, nausea, vomiting, diarrhea, shortness of breath, pedal edema, Orthopnea, PND.  Home Blood pressures are not checked     ROS:  General: negative for chills, or fatigue  Respiratory: No cough, shortness of breath, or wheezing  Cardiovascular: No chest pain or dyspnea   Gastrointestinal: No abdominal pain, change in bowel habits  Genito-Urinary: No dysuria, trouble voiding, or hematuria    PAST MEDICAL HISTORY:  Past Medical History:   Diagnosis Date    History of stroke 05/09/2017    Hypertension 05/09/2017    Immunosuppression due to drug therapy  10/24/2022    SLE (systemic lupus erythematosus)     Vertigo        PAST SURGICAL HISTORY:  Past Surgical History:   Procedure Laterality Date    RENAL BIOPSY         FAMILY HISTORY:   Family History   Problem Relation Age of Onset    Diabetes Mother     Cancer Sister         Breast    Stroke Neg Hx     Heart attack Neg Hx        SOCIAL HISTORY:  Social History     Socioeconomic History    Marital status: Single   Tobacco Use    Smoking status: Never    Smokeless tobacco: Never   Substance and Sexual Activity    Alcohol use: No    Drug use: No    Sexual activity: Not Currently     Social Determinants of Health     Financial Resource Strain: Medium Risk (8/16/2023)    Overall Financial Resource Strain (CARDIA)     Difficulty of Paying Living Expenses: Somewhat hard   Food Insecurity: No Food Insecurity (8/16/2023)    Hunger Vital Sign     Worried About Running Out of Food in the Last Year: Never true     Ran Out of Food in the Last Year: Never true   Transportation Needs: No Transportation Needs (8/16/2023)    PRAPARE - Transportation     Lack of Transportation (Medical): No     Lack of Transportation (Non-Medical): No   Stress: No Stress Concern Present (8/16/2023)    [employer] - Occupational Stress Questionnaire     Feeling of Stress : Only a little   Social Connections: Unknown (8/16/2023)    Social Connection and Isolation Panel [NHANES]     Marital Status: Never    Housing Stability: Unknown (8/16/2023)    Housing Stability Vital Sign     Unable to Pay for Housing in the Last Year: No     Unstable Housing in the Last Year: No       ALLERGIES:  Review of patient's allergies indicates:   Allergen Reactions    Sulfamethoxazole-trimethoprim Rash       MEDICATIONS:    Current Outpatient Medications:     aspirin (ECOTRIN) 81 MG EC tablet, Take 1 tablet (81 mg total) by mouth once daily., Disp: 90 tablet, Rfl: 1    ergocalciferol (ERGOCALCIFEROL) 50,000 unit Cap, Take 1 capsule  (50,000 Units total) by mouth every 7 days., Disp: 28 capsule, Rfl: 1    atorvastatin (LIPITOR) 40 MG tablet, Take 1 tablet (40 mg total) by mouth once daily., Disp: 90 tablet, Rfl: 1    hydroxychloroquine (PLAQUENIL) 200 mg tablet, Take 1 tablet (200 mg total) by mouth once daily., Disp: 90 tablet, Rfl: 1    mycophenolate (CELLCEPT) 500 mg Tab, Take 3 tablets (1,500 mg total) by mouth 2 (two) times daily., Disp: 540 tablet, Rfl: 1    pantoprazole (PROTONIX) 20 MG tablet, Take 1 tablet (20 mg total) by mouth once daily., Disp: 90 tablet, Rfl: 1    predniSONE (DELTASONE) 2.5 MG tablet, Take 1 tablet (2.5 mg total) by mouth once daily., Disp: 90 tablet, Rfl: 1   Medication List with Changes/Refills   Current Medications    ASPIRIN (ECOTRIN) 81 MG EC TABLET    Take 1 tablet (81 mg total) by mouth once daily.    ERGOCALCIFEROL (ERGOCALCIFEROL) 50,000 UNIT CAP    Take 1 capsule (50,000 Units total) by mouth every 7 days.   Changed and/or Refilled Medications    Modified Medication Previous Medication    ATORVASTATIN (LIPITOR) 40 MG TABLET atorvastatin (LIPITOR) 40 MG tablet       Take 1 tablet (40 mg total) by mouth once daily.    Take 1 tablet (40 mg total) by mouth once daily.    HYDROXYCHLOROQUINE (PLAQUENIL) 200 MG TABLET hydrOXYchloroQUINE (PLAQUENIL) 200 mg tablet       Take 1 tablet (200 mg total) by mouth once daily.    Take 1 tablet (200 mg total) by mouth once daily.    MYCOPHENOLATE (CELLCEPT) 500 MG TAB mycophenolate (CELLCEPT) 500 mg Tab       Take 3 tablets (1,500 mg total) by mouth 2 (two) times daily.    Take 3 tablets (1,500 mg total) by mouth 2 (two) times daily.    PANTOPRAZOLE (PROTONIX) 20 MG TABLET pantoprazole (PROTONIX) 20 MG tablet       Take 1 tablet (20 mg total) by mouth once daily.    Take 1 tablet (20 mg total) by mouth once daily.    PREDNISONE (DELTASONE) 2.5 MG TABLET predniSONE (DELTASONE) 2.5 MG tablet       Take 1 tablet (2.5 mg total) by mouth once daily.    Take 1 tablet (2.5 mg  total) by mouth once daily.   Discontinued Medications    DICLOFENAC SODIUM (VOLTAREN) 1 % GEL    Apply 2 g topically 3 (three) times daily as needed (joint or muscle pain). Apply 2 grams to affected area 3 times daily as needed    LIDOCAINE (LIDODERM) 5 %    Apply to affected area as needed for pain for 12 hours, then off for 12 hours. Discard after each use    ONDANSETRON (ZOFRAN-ODT) 4 MG TBDL    Take 1 tablet (4 mg total) by mouth every 6 (six) hours as needed (nausea).        PHYSICAL EXAM:  /76   Pulse 77   Wt 57 kg (125 lb 10.6 oz)   BMI 21.57 kg/m²     General: No distress, No fever or chills  Lungs:Clear to auscultation bilaterally, No Crackles  Heart: Regular rate and rhythm, no murmur, gallops or rubs  Abdomen: Soft, no tenderness, bowel sounds normal  Extremities: Atraumatic, no edema in LE  Skin: Turgor normal. Rash on face  Neurologic: No focal weakness, oriented.  Dialysis Access: Non applicable        LABS:  Lab Results   Component Value Date    HGB 9.9 (L) 10/11/2023        Lab Results   Component Value Date    CREATININE 0.8 10/11/2023       Prot/Creat Ratio, Urine   Date Value Ref Range Status   10/11/2023 0.43 (H) 0.00 - 0.20 Final   07/07/2023 0.28 (H) 0.00 - 0.20 Final   04/19/2023 0.54 (H) 0.00 - 0.20 Final       Lab Results   Component Value Date     10/11/2023    K 4.2 10/11/2023    CO2 25 10/11/2023       last PTH   Lab Results   Component Value Date    PTH 76.3 10/11/2023    CALCIUM 9.0 10/11/2023    PHOS 3.9 10/11/2023       Lab Results   Component Value Date    HGBA1C 5.5 04/02/2021       Lab Results   Component Value Date    LDLCALC 112.0 07/07/2023         Creatinine, Urine   Date Value Ref Range Status   10/11/2023 109.0 15.0 - 325.0 mg/dL Final   07/07/2023 124.0 15.0 - 325.0 mg/dL Final   04/19/2023 74.0 15.0 - 325.0 mg/dL Final       Protein, Urine Random   Date Value Ref Range Status   10/11/2023 47 (H) 0 - 15 mg/dL Final   07/07/2023 35 (H) 0 - 15 mg/dL Final    04/19/2023 40 (H) 0 - 15 mg/dL Final       Prot/Creat Ratio, Urine   Date Value Ref Range Status   10/11/2023 0.43 (H) 0.00 - 0.20 Final   07/07/2023 0.28 (H) 0.00 - 0.20 Final   04/19/2023 0.54 (H) 0.00 - 0.20 Final         ASSESSMENT:    1) Lupus Nephritis unknown class partial remission  2) Immunosuppression due to drug therapy  3) Systemic Lupus.  4) Hyperkalemia - Resolved  5) Vitamin D deficiency  6) Ischemic stroke - continue aspirin and statin.   Pt was reportedly seeing Dr. Adamson in 2018 and it is unclear if she is seeing any nephrologist since then. Last rheumatology clinic notes [facility] stated that patient is on Cellcept 3 Gr/day, Plaquenil 200 mg BID and prednisone 2.5 mg daily. Due to proteinuria there was discussion about kidney biopsy too. There was also a question about patients medication compliance in the past due to patients forgetfulness. Her ultrasound 4/2021 showed 9.8 and 9.2 cm kidneys.   During her first clinic visit (around April 2021) her creatinine came back at 1.8 which is above her baseline of less than one and hyperkalemia. Discontinued her lisinopril and her BLAYNE and hyperkalemia improved making sublinical hypotension likely etiology for this. Urine microscopy and urine analysis did not show significant RBC, protein creatinine ratio did not show significant proteinuria.   Next clinic visit in July 2021 showed elevated creatinine and proteinuria. Started on prednisone which lead to improvement in kidney function and proteinuria. Requested patient to get a kidney biopsy, however she was hesitant. Re-discussed about Benlysta and kidney biopsy during subsequent visits patient did not want them. Also since July 2021 to April 2022 (10 months duration) she only filled 6 months worth of medication.   Patient declined Biopsy and Benlysta/Volcosporin.    Acid Base, electrolytes, Hemoglobin, Bone Mineral in acceptable range.    - Continue Cellcept 3 Gr/day, Changing plaquenil to  200 mg daily (continues to miss a lot of doses)  - Prednisone at 2.5 mg daily, Vitamin D improved a little, (Reinforced compliance and sent it again)  - Requested to see eye doctor (reports seeing one few months ago, outside [facility], also informed daughter in previous visits)  - Pt and daughter aware that Lupus is not fully controlled, and it is unlikely to get controlled without absolute compliance with medications +/- adding Benlysta/Voclosporin.  - Continue to hold lisinopril for now.  - Avoid NSAIDs intake  - Re-counselled about being compliant with medications  - Reminded to keep uptodate with cancer screening and vaccines.    RTC in 3 months    Diagnosis and plan of care explained to the patient and daughter (on phone) at length.  Verbalized understanding. Answered all questions.  Thanks for allowing me to participate in the care of this patient.     1:47 PM    Franko Faria MD  Nephrology & Critical Care    32 minutes of total time spent on the encounter, which includes face to face time and non-face to face time preparing to see the patient (eg, review of tests), Obtaining and/or reviewing separately obtained history, documenting clinical information in the electronic or other health record, independently interpreting results (not separately reported) and communicating results to the patient/family/caregiver, or Care coordination (not separately reported).

## 2023-10-28 ENCOUNTER — PATIENT OUTREACH (OUTPATIENT)
Dept: EMERGENCY MEDICINE | Facility: OTHER | Age: 48
End: 2023-10-28
Payer: MEDICAID

## 2023-10-31 ENCOUNTER — PATIENT MESSAGE (OUTPATIENT)
Dept: RHEUMATOLOGY | Facility: CLINIC | Age: 48
End: 2023-10-31
Payer: MEDICAID

## 2023-11-02 NOTE — PROGRESS NOTES
Subjective:     Patient ID: Babak Arevalo is a 48 y.o. female w SLE followed by nephrology & heme-onc  Chief Complaint: No chief complaint on file.  PCP: [facility]               She reports no joint swelling. Pertinent negatives include no dysuria, fatigue or myalgias (intermittent).            48 yr old lady w SLE  seen for the first time on 8/13/21 & last on 4/19/23.  She returns for f/u.  She is still taking MMF 3 g/d, HCQ 10 tablets/wk & prednisone 2.5 mg/d.    She is followed by nephrology, Dr. Faria for proteinuria due to lupus nephritis (renal bx 2001) and MMF & prednisone are prescribed by Dr. GARNER.   She has a hx of incomplete compliance.   She saw him recently & gave a hx of missing/skipping meds  We have prescribed HCQ for her and she states she had an eye exam by the [facility] but cannot give any further information.  She does not know what it was for.      She still continues wo SLE complaints. Denies AM stiffness, joint swelling, dysphagia, tight skin, oral ulcers, parotid gland swelling, dry eyes, dry mouth, pleurisy, pericarditis, photosensitivity, muscle weakness; fevers, family hx. She still has inactive DLE lesions. Gets Raynaud's of hands & feet (since 2001) often; no digital lesions. Concerned about hair thinning. In the past has had patches of hair loss & 1 miscarriage in 1997 out of 4 pregnancies.   She has a brother who's had on PE & is on long term anticoagulation.    Has been prescribed ergocalciferol for very low vitamin D, but states that insurance wont  pay for it and she has not gotten it filled.    Did not get the DXA that was ordered..     Pertinent:   SLE dx 2001 followed by the late Dr. Kramer after she had a syncopal episode, was hospitalized and ultimately found to have SLE  She states that she also was found to have CVAs .  At that time had renal bx that confirmed lupus nephritis & was on CTX every month ~ 1yr +.  Has been on HCQ since 2001.  Has subsequently been on  MMF x many years.  Has been on prednisone chronically. Lowest dose was 2.5 mg/d.  Was upped to 40 mg/d on 7/22/21 by nephrology when she was found to have renal insufficiency and proteinuria    Has had many CVA's in 2001 --was on coumadin  x few years,    In April was hospitalized at [facility] as she kept on having left sided head pain & MRI/MRA showed infarcts in the left temporal & occipital lobes, splenium of the corpus callosum and left PCA & posterior MCA. 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  There was w/u for emboli but apparently not positive.          Current Outpatient Medications   Medication Sig Dispense Refill    aspirin (ECOTRIN) 81 MG EC tablet Take 1 tablet (81 mg total) by mouth once daily. 90 tablet 1    atorvastatin (LIPITOR) 40 MG tablet Take 1 tablet (40 mg total) by mouth once daily. 90 tablet 1    ergocalciferol (ERGOCALCIFEROL) 50,000 unit Cap Take 1 capsule (50,000 Units total) by mouth every 7 days. 28 capsule 1    hydroxychloroquine (PLAQUENIL) 200 mg tablet Take 1 tablet (200 mg total) by mouth once daily. 90 tablet 1    mycophenolate (CELLCEPT) 500 mg Tab Take 3 tablets (1,500 mg total) by mouth 2 (two) times daily. 540 tablet 1    pantoprazole (PROTONIX) 20 MG tablet Take 1 tablet (20 mg total) by mouth once daily. 90 tablet 1    predniSONE (DELTASONE) 2.5 MG tablet Take 1 tablet (2.5 mg total) by mouth once daily. 90 tablet 1     No current facility-administered medications for this visit.     Not taking ergocalciferol.      Review of patient's allergies indicates:   Allergen Reactions    Sulfamethoxazole-trimethoprim Rash       Review of Systems   Constitutional:  Negative for fatigue.             HENT: Negative.  Negative for hearing loss and postnasal drip.    Eyes: Negative.  Negative for photophobia and pain.   Respiratory: Negative.  Negative for cough and shortness of breath.    Cardiovascular: Negative.  Negative for chest pain and leg swelling.  "  Gastrointestinal:  Negative for abdominal distention, nausea and vomiting.   Endocrine: Negative.  Negative for cold intolerance and heat intolerance.   Genitourinary:  Negative for difficulty urinating, dysuria, genital sores and menstrual problem.        Scant menses;1-3 days; not a lot of bleeding; no hot flushes/night sweats.   Musculoskeletal:  Negative for arthralgias, back pain, joint swelling, myalgias (intermittent), neck pain and neck stiffness.   Neurological:  Negative for syncope (remotely).   Hematological:  Negative for adenopathy. Does not bruise/bleed easily.   Psychiatric/Behavioral:  Negative for dysphoric mood and sleep disturbance. The patient is not nervous/anxious.        Past Medical History:   Diagnosis Date    History of stroke 05/09/2017    Hypertension 05/09/2017    Immunosuppression due to drug therapy 10/24/2022    SLE (systemic lupus erythematosus)     Vertigo        Past Surgical History:   Procedure Laterality Date    RENAL BIOPSY         Family History   Problem Relation Age of Onset    Diabetes Mother     Cancer Sister         Breast    Stroke Neg Hx     Heart attack Neg Hx    M: w arthritis x years.  F: in a wheelchair got shot  4 sisters --one had breast cancer  1 brother  Has son w asthma--controlled  Also has adult son  Has adult daughter who used to work at [employer] as a phlebotomist now at [employer]    Social History     Tobacco Use    Smoking status: Never    Smokeless tobacco: Never   Substance Use Topics    Alcohol use: No    Drug use: No       Objective:     /73   Pulse 92   Ht 5' 4" (1.626 m)   Wt 59.5 kg (131 lb 2.8 oz)   BMI 22.52 kg/m²       Physical Exam   Constitutional: She is oriented to person, place, and time. No distress.   HENT:   Head: Normocephalic and atraumatic.   Mouth/Throat: Oropharynx is clear and moist. Mucous membranes are moist. No oropharyngeal exudate. Oropharynx is clear.   No facial rashes  Parotids not enlarged  No oral " ulcers   Eyes: Pupils are equal, round, and reactive to light. Conjunctivae are normal. Right eye exhibits no discharge. Left eye exhibits no discharge. No scleral icterus.   Neck: No JVD present. No tracheal deviation present. No thyromegaly present.   Cardiovascular: Normal rate, regular rhythm and normal heart sounds. Exam reveals no gallop and no friction rub.   No murmur heard.  Pulmonary/Chest: Effort normal and breath sounds normal. No respiratory distress. She has no wheezes. She has no rales. She exhibits no tenderness.   Abdominal: Soft. Bowel sounds are normal. She exhibits no distension and no mass. There is no splenomegaly or hepatomegaly. There is no abdominal tenderness. There is no rebound and no guarding.   Musculoskeletal:         General: No tenderness. Normal range of motion.      Cervical back: Neck supple.      Comments: Cspine FROM no tenderness  Tspine FROM no tenderness  Lspine FROM no tenderness.  TMJ: unremarkable  Shoulders: FROM; no synovitis;  Elbows: FROM; no synovitis; no tophi or nodules  Wrists: FROM; no synovitis;    MCPs: FROM; no synovitis; no metacarpalgia;  ok;  PIPs:FROM; no synovitis;   DIPs: FROM; no synovitis;   HIPS: FROM  Knees: FROM; no synovitis; no instability;  Ankles: FROM: no synovitis   Toes: ok;        Lymphadenopathy:     She has no cervical adenopathy.   Neurological: She is alert and oriented to person, place, and time. She has normal reflexes. No cranial nerve deficit. Gait normal.   Proximal and distal muscle strength 5/5.   Skin: Skin is warm and dry. No rash noted. She is not diaphoretic.   See previous photos;  DLE rashes bridge of nose, ear canals and posterior scalp--appear improved.   Psychiatric: Mood, memory and affect normal.   Vitals reviewed.      10/11/23: CBC Hg 9.9; Ht 32.6; low indices; CMP alb 3.3; UA 1+ pr; 2HC; U pr/cnne 0.43; dsDNA 1:1280; cyst C 1.23 (1.03); GFR by cyst C 58;   Vit D 12  7/7/23: C3 71; C4 19;   1/18/23: CBC Hg 10.2; Ht  34.4; plt 116; CMP ok; Vit D 10; dsDNA 1:640; C3 67; C4 18; U pr/cnne 0.83  10/24/22: CBC Hg 9.8; Ht 31.8; low indices: BMP alb 3.4; UA 1+ pr; 2+ OB; WC >100; 15 RBC; 4 H; U pr/cnne .93; CPK 61; Vit D 16; dsDNA 1:640; C3 60; C4 14;   8/19/22: CBC Hg 9.7; Ht 30.4; CMP Alb 3.3; UA 1+ pr; 1+ bl; 7 RBC; U pr/cnne -.55; dsDNA 1:640;   6/17/22: CBC Hg 10.7; Ht 33; low indices; plt 144; CMP ok; dsDNA 1:640; C3 67; C4 17  4/13/22: UA 2+ pr; 1+ leuk; & RBC;  U pr/cne .69  10/28/21: ESR 50 (20); CRP 3.1; CBC Hg 10.1; Ht 33.3; low indices; BMP ok; UA 2+ OB; neg pro; U pr/cnne 0.39;  C3 79; C4 21;   9/24/21:CBC Hg 9.1; Ht 29.4; plts 142; WC 3.82;  BMP glu 65; phosh 4.7 (4.5); CPK 65; Ua 2+ pr; 1+ OB; U pr/cnne 0.42; ds DNA 1:320    8/13/21:  (36); CRP 1.0; RF neg; C3 72; C4 19; IgG 2506 (1600); IgA 979 (350);   7/19/21: CBC Hg 10.8; Ht 34.8; BMP cnne 1.7; GFR 42; P 4.7 (4.5); UA 3+ pr; U pr/cnne 1.28; PTH 53;   MARSHA 1>2560 Sp: dsDNA 1:80; +SSA+Sm; +RNP; C3 60; C4 23  6/10/21: haptoglobin 136; CARLEY neg;  5/6/21: LAC neg; aCLA neg/neg; beta 2 gly IgA 55 (20); rest ok;   Assessment:   SLE since 2001   +MARSHA+dsDNA+Sm+RNP   Ds DNA ab chronically elevated--max >1:5120    Never normal since 4/6/21   Mild leukopenia & thrombocytopenia--last 116k   DLE/patchy alopecia/lupus nephritis     Lupus nephritis by hx & bx   Max proteinuria 1.28 g (7/921)   S/P CTX 6633-8573    mg bid 3 x/wk.   On MMF 1500 mg bid   On prednisone 2.5 mg/d.      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    CKD 3 a by cystatin D  S/P BLAYNE on chronic    Vitamin D insufficiency/deficiency   Has not filled ergocalciferol due to cost.     Incomplete compliance      Plan:   Discussed vaccines again--does not want.  Continue HCQ 10 tablets/wk (appropriate for weight)  Needs to confirm eye exam ok;  Continue MMF &  prednisone as per nephrology.  Still needs DXA  Discussed vitamin D supplementation.   Discussed OTC cholecalciferol (can be obtained through [facility] catalog).  This was explained to her.   RTC 6 months or prn      CC: Franko Faria MD

## 2023-11-06 ENCOUNTER — OFFICE VISIT (OUTPATIENT)
Dept: RHEUMATOLOGY | Facility: CLINIC | Age: 48
End: 2023-11-06
Payer: MEDICARE

## 2023-11-06 VITALS
BODY MASS INDEX: 22.4 KG/M2 | HEART RATE: 92 BPM | HEIGHT: 64 IN | DIASTOLIC BLOOD PRESSURE: 73 MMHG | SYSTOLIC BLOOD PRESSURE: 105 MMHG | WEIGHT: 131.19 LBS

## 2023-11-06 DIAGNOSIS — Z79.52 LONG TERM (CURRENT) USE OF SYSTEMIC STEROIDS: ICD-10-CM

## 2023-11-06 DIAGNOSIS — Z79.60 LONG-TERM USE OF IMMUNOSUPPRESSANT MEDICATION: ICD-10-CM

## 2023-11-06 DIAGNOSIS — R76.8 DS DNA ANTIBODY POSITIVE: ICD-10-CM

## 2023-11-06 DIAGNOSIS — M32.9 SYSTEMIC LUPUS ERYTHEMATOSUS, UNSPECIFIED SLE TYPE, UNSPECIFIED ORGAN INVOLVEMENT STATUS: Primary | ICD-10-CM

## 2023-11-06 DIAGNOSIS — Z79.899 LONG-TERM USE OF HYDROXYCHLOROQUINE: ICD-10-CM

## 2023-11-06 DIAGNOSIS — E55.9 VITAMIN D INSUFFICIENCY: ICD-10-CM

## 2023-11-06 PROCEDURE — 3066F NEPHROPATHY DOC TX: CPT | Mod: CPTII,S$GLB,, | Performed by: INTERNAL MEDICINE

## 2023-11-06 PROCEDURE — 3066F PR DOCUMENTATION OF TREATMENT FOR NEPHROPATHY: ICD-10-PCS | Mod: CPTII,S$GLB,, | Performed by: INTERNAL MEDICINE

## 2023-11-06 PROCEDURE — 1160F PR REVIEW ALL MEDS BY PRESCRIBER/CLIN PHARMACIST DOCUMENTED: ICD-10-PCS | Mod: CPTII,S$GLB,, | Performed by: INTERNAL MEDICINE

## 2023-11-06 PROCEDURE — 1160F RVW MEDS BY RX/DR IN RCRD: CPT | Mod: CPTII,S$GLB,, | Performed by: INTERNAL MEDICINE

## 2023-11-06 PROCEDURE — 3008F BODY MASS INDEX DOCD: CPT | Mod: CPTII,S$GLB,, | Performed by: INTERNAL MEDICINE

## 2023-11-06 PROCEDURE — 3078F PR MOST RECENT DIASTOLIC BLOOD PRESSURE < 80 MM HG: ICD-10-PCS | Mod: CPTII,S$GLB,, | Performed by: INTERNAL MEDICINE

## 2023-11-06 PROCEDURE — 3078F DIAST BP <80 MM HG: CPT | Mod: CPTII,S$GLB,, | Performed by: INTERNAL MEDICINE

## 2023-11-06 PROCEDURE — 3074F PR MOST RECENT SYSTOLIC BLOOD PRESSURE < 130 MM HG: ICD-10-PCS | Mod: CPTII,S$GLB,, | Performed by: INTERNAL MEDICINE

## 2023-11-06 PROCEDURE — 99999 PR PBB SHADOW E&M-EST. PATIENT-LVL III: CPT | Mod: PBBFAC,,, | Performed by: INTERNAL MEDICINE

## 2023-11-06 PROCEDURE — 3074F SYST BP LT 130 MM HG: CPT | Mod: CPTII,S$GLB,, | Performed by: INTERNAL MEDICINE

## 2023-11-06 PROCEDURE — 3008F PR BODY MASS INDEX (BMI) DOCUMENTED: ICD-10-PCS | Mod: CPTII,S$GLB,, | Performed by: INTERNAL MEDICINE

## 2023-11-06 PROCEDURE — 1159F PR MEDICATION LIST DOCUMENTED IN MEDICAL RECORD: ICD-10-PCS | Mod: CPTII,S$GLB,, | Performed by: INTERNAL MEDICINE

## 2023-11-06 PROCEDURE — 1159F MED LIST DOCD IN RCRD: CPT | Mod: CPTII,S$GLB,, | Performed by: INTERNAL MEDICINE

## 2023-11-06 PROCEDURE — 99214 PR OFFICE/OUTPT VISIT, EST, LEVL IV, 30-39 MIN: ICD-10-PCS | Mod: S$GLB,,, | Performed by: INTERNAL MEDICINE

## 2023-11-06 PROCEDURE — 99999 PR PBB SHADOW E&M-EST. PATIENT-LVL III: ICD-10-PCS | Mod: PBBFAC,,, | Performed by: INTERNAL MEDICINE

## 2023-11-06 PROCEDURE — 99214 OFFICE O/P EST MOD 30 MIN: CPT | Mod: S$GLB,,, | Performed by: INTERNAL MEDICINE

## 2023-11-06 ASSESSMENT — SYSTEMIC LUPUS ERYTHEMATOSUS DISEASE ACTIVITY INDEX (SLEDAI): TOTAL_SCORE: 2

## 2023-11-06 NOTE — PATIENT INSTRUCTIONS
On hydroxychloroquine you need eye exams at least once a year.   Please make certain this is done and your ophthalmologist checks you for this.    Your vitamin D level is very low.  Please take 10,000 IU daily of vitamin D3 every day x 3 months.  One way of doing this is to get vitamin D3 5,000 IU capsules and take 2 every day for 3 months.  Then take take 10,000 IU  once a week thereafter.

## 2023-12-11 ENCOUNTER — TELEPHONE (OUTPATIENT)
Dept: NEPHROLOGY | Facility: CLINIC | Age: 48
End: 2023-12-11
Payer: MEDICAID

## 2023-12-11 ENCOUNTER — PATIENT OUTREACH (OUTPATIENT)
Dept: EMERGENCY MEDICINE | Facility: OTHER | Age: 48
End: 2023-12-11
Payer: MEDICAID

## 2023-12-11 NOTE — TELEPHONE ENCOUNTER
----- Message from Ambika De Jesus LPN sent at 12/11/2023  9:49 AM CST -----  Regarding: Reschedule Request  Good Morning,    I was speaking with this patient for ED Navigation F/U and she is asking if Nephrology has an available appointment today. Patient has an appointment already on Wednesday, but she stated it would be more convenient for her to come in today to be seen. I informed her I am unable to move this appointment, but would send a message to see if someone from the clinic could reach out to her today.     Thanks in advance!

## 2023-12-13 ENCOUNTER — LAB VISIT (OUTPATIENT)
Dept: LAB | Facility: HOSPITAL | Age: 48
End: 2023-12-13
Attending: INTERNAL MEDICINE
Payer: MEDICARE

## 2023-12-13 ENCOUNTER — OFFICE VISIT (OUTPATIENT)
Dept: NEPHROLOGY | Facility: CLINIC | Age: 48
End: 2023-12-13
Payer: MEDICARE

## 2023-12-13 VITALS
BODY MASS INDEX: 21.72 KG/M2 | WEIGHT: 126.56 LBS | DIASTOLIC BLOOD PRESSURE: 93 MMHG | HEART RATE: 97 BPM | SYSTOLIC BLOOD PRESSURE: 135 MMHG

## 2023-12-13 DIAGNOSIS — D84.821 IMMUNOSUPPRESSION DUE TO DRUG THERAPY: Primary | ICD-10-CM

## 2023-12-13 DIAGNOSIS — I63.9 ISCHEMIC STROKE: ICD-10-CM

## 2023-12-13 DIAGNOSIS — M32.14 LUPUS NEPHRITIS: ICD-10-CM

## 2023-12-13 DIAGNOSIS — D84.821 IMMUNOSUPPRESSION DUE TO DRUG THERAPY: ICD-10-CM

## 2023-12-13 DIAGNOSIS — Z79.899 IMMUNOSUPPRESSION DUE TO DRUG THERAPY: Primary | ICD-10-CM

## 2023-12-13 DIAGNOSIS — M32.9 SYSTEMIC LUPUS ERYTHEMATOSUS, UNSPECIFIED SLE TYPE, UNSPECIFIED ORGAN INVOLVEMENT STATUS: ICD-10-CM

## 2023-12-13 DIAGNOSIS — E55.9 VITAMIN D DEFICIENCY, UNSPECIFIED: ICD-10-CM

## 2023-12-13 DIAGNOSIS — Z79.899 IMMUNOSUPPRESSION DUE TO DRUG THERAPY: ICD-10-CM

## 2023-12-13 LAB
ALBUMIN SERPL BCP-MCNC: 3.4 G/DL (ref 3.5–5.2)
ALBUMIN SERPL BCP-MCNC: 3.4 G/DL (ref 3.5–5.2)
ALP SERPL-CCNC: 92 U/L (ref 55–135)
ALT SERPL W/O P-5'-P-CCNC: 12 U/L (ref 10–44)
ANION GAP SERPL CALC-SCNC: 8 MMOL/L (ref 8–16)
AST SERPL-CCNC: 18 U/L (ref 10–40)
BASOPHILS # BLD AUTO: 0.03 K/UL (ref 0–0.2)
BASOPHILS NFR BLD: 0.8 % (ref 0–1.9)
BILIRUB DIRECT SERPL-MCNC: 0.1 MG/DL (ref 0.1–0.3)
BILIRUB SERPL-MCNC: 0.3 MG/DL (ref 0.1–1)
BUN SERPL-MCNC: 15 MG/DL (ref 6–20)
C3 SERPL-MCNC: 70 MG/DL (ref 50–180)
C4 SERPL-MCNC: 20 MG/DL (ref 11–44)
CALCIUM SERPL-MCNC: 9 MG/DL (ref 8.7–10.5)
CHLORIDE SERPL-SCNC: 106 MMOL/L (ref 95–110)
CO2 SERPL-SCNC: 26 MMOL/L (ref 23–29)
CREAT SERPL-MCNC: 0.9 MG/DL (ref 0.5–1.4)
DIFFERENTIAL METHOD: ABNORMAL
EOSINOPHIL # BLD AUTO: 0 K/UL (ref 0–0.5)
EOSINOPHIL NFR BLD: 0.3 % (ref 0–8)
ERYTHROCYTE [DISTWIDTH] IN BLOOD BY AUTOMATED COUNT: 14.4 % (ref 11.5–14.5)
EST. GFR  (NO RACE VARIABLE): >60 ML/MIN/1.73 M^2
GLUCOSE SERPL-MCNC: 81 MG/DL (ref 70–110)
HCT VFR BLD AUTO: 34.2 % (ref 37–48.5)
HGB BLD-MCNC: 10.7 G/DL (ref 12–16)
IMM GRANULOCYTES # BLD AUTO: 0.01 K/UL (ref 0–0.04)
IMM GRANULOCYTES NFR BLD AUTO: 0.3 % (ref 0–0.5)
LYMPHOCYTES # BLD AUTO: 1.3 K/UL (ref 1–4.8)
LYMPHOCYTES NFR BLD: 33 % (ref 18–48)
MCH RBC QN AUTO: 26.4 PG (ref 27–31)
MCHC RBC AUTO-ENTMCNC: 31.3 G/DL (ref 32–36)
MCV RBC AUTO: 84 FL (ref 82–98)
MONOCYTES # BLD AUTO: 0.4 K/UL (ref 0.3–1)
MONOCYTES NFR BLD: 10.6 % (ref 4–15)
NEUTROPHILS # BLD AUTO: 2.2 K/UL (ref 1.8–7.7)
NEUTROPHILS NFR BLD: 55 % (ref 38–73)
NRBC BLD-RTO: 0 /100 WBC
PHOSPHATE SERPL-MCNC: 3.7 MG/DL (ref 2.7–4.5)
PLATELET # BLD AUTO: 181 K/UL (ref 150–450)
PMV BLD AUTO: 11.8 FL (ref 9.2–12.9)
POTASSIUM SERPL-SCNC: 4.4 MMOL/L (ref 3.5–5.1)
PROT SERPL-MCNC: 9.2 G/DL (ref 6–8.4)
PTH-INTACT SERPL-MCNC: 60 PG/ML (ref 9–77)
RBC # BLD AUTO: 4.06 M/UL (ref 4–5.4)
SODIUM SERPL-SCNC: 140 MMOL/L (ref 136–145)
WBC # BLD AUTO: 3.97 K/UL (ref 3.9–12.7)

## 2023-12-13 PROCEDURE — 85025 COMPLETE CBC W/AUTO DIFF WBC: CPT | Performed by: INTERNAL MEDICINE

## 2023-12-13 PROCEDURE — 84075 ASSAY ALKALINE PHOSPHATASE: CPT | Performed by: INTERNAL MEDICINE

## 2023-12-13 PROCEDURE — 3080F DIAST BP >= 90 MM HG: CPT | Mod: CPTII,S$GLB,, | Performed by: INTERNAL MEDICINE

## 2023-12-13 PROCEDURE — 3066F PR DOCUMENTATION OF TREATMENT FOR NEPHROPATHY: ICD-10-PCS | Mod: CPTII,S$GLB,, | Performed by: INTERNAL MEDICINE

## 2023-12-13 PROCEDURE — 36415 COLL VENOUS BLD VENIPUNCTURE: CPT | Performed by: INTERNAL MEDICINE

## 2023-12-13 PROCEDURE — 3080F PR MOST RECENT DIASTOLIC BLOOD PRESSURE >= 90 MM HG: ICD-10-PCS | Mod: CPTII,S$GLB,, | Performed by: INTERNAL MEDICINE

## 2023-12-13 PROCEDURE — 86225 DNA ANTIBODY NATIVE: CPT | Performed by: INTERNAL MEDICINE

## 2023-12-13 PROCEDURE — 3075F PR MOST RECENT SYSTOLIC BLOOD PRESS GE 130-139MM HG: ICD-10-PCS | Mod: CPTII,S$GLB,, | Performed by: INTERNAL MEDICINE

## 2023-12-13 PROCEDURE — 86225 DNA ANTIBODY NATIVE: CPT | Mod: 59 | Performed by: INTERNAL MEDICINE

## 2023-12-13 PROCEDURE — 1159F PR MEDICATION LIST DOCUMENTED IN MEDICAL RECORD: ICD-10-PCS | Mod: CPTII,S$GLB,, | Performed by: INTERNAL MEDICINE

## 2023-12-13 PROCEDURE — 99215 PR OFFICE/OUTPT VISIT, EST, LEVL V, 40-54 MIN: ICD-10-PCS | Mod: S$GLB,,, | Performed by: INTERNAL MEDICINE

## 2023-12-13 PROCEDURE — 3066F NEPHROPATHY DOC TX: CPT | Mod: CPTII,S$GLB,, | Performed by: INTERNAL MEDICINE

## 2023-12-13 PROCEDURE — 3075F SYST BP GE 130 - 139MM HG: CPT | Mod: CPTII,S$GLB,, | Performed by: INTERNAL MEDICINE

## 2023-12-13 PROCEDURE — 1160F PR REVIEW ALL MEDS BY PRESCRIBER/CLIN PHARMACIST DOCUMENTED: ICD-10-PCS | Mod: CPTII,S$GLB,, | Performed by: INTERNAL MEDICINE

## 2023-12-13 PROCEDURE — 1160F RVW MEDS BY RX/DR IN RCRD: CPT | Mod: CPTII,S$GLB,, | Performed by: INTERNAL MEDICINE

## 2023-12-13 PROCEDURE — 86160 COMPLEMENT ANTIGEN: CPT | Performed by: INTERNAL MEDICINE

## 2023-12-13 PROCEDURE — 1159F MED LIST DOCD IN RCRD: CPT | Mod: CPTII,S$GLB,, | Performed by: INTERNAL MEDICINE

## 2023-12-13 PROCEDURE — 3008F PR BODY MASS INDEX (BMI) DOCUMENTED: ICD-10-PCS | Mod: CPTII,S$GLB,, | Performed by: INTERNAL MEDICINE

## 2023-12-13 PROCEDURE — 99999 PR PBB SHADOW E&M-EST. PATIENT-LVL II: CPT | Mod: PBBFAC,,, | Performed by: INTERNAL MEDICINE

## 2023-12-13 PROCEDURE — 99999 PR PBB SHADOW E&M-EST. PATIENT-LVL II: ICD-10-PCS | Mod: PBBFAC,,, | Performed by: INTERNAL MEDICINE

## 2023-12-13 PROCEDURE — 83970 ASSAY OF PARATHORMONE: CPT | Performed by: INTERNAL MEDICINE

## 2023-12-13 PROCEDURE — 80069 RENAL FUNCTION PANEL: CPT | Performed by: INTERNAL MEDICINE

## 2023-12-13 PROCEDURE — 86160 COMPLEMENT ANTIGEN: CPT | Mod: 59 | Performed by: INTERNAL MEDICINE

## 2023-12-13 PROCEDURE — 99215 OFFICE O/P EST HI 40 MIN: CPT | Mod: S$GLB,,, | Performed by: INTERNAL MEDICINE

## 2023-12-13 PROCEDURE — 3008F BODY MASS INDEX DOCD: CPT | Mod: CPTII,S$GLB,, | Performed by: INTERNAL MEDICINE

## 2023-12-13 RX ORDER — MYCOPHENOLATE MOFETIL 500 MG/1
1500 TABLET ORAL 2 TIMES DAILY
Qty: 540 TABLET | Refills: 1 | Status: SHIPPED | OUTPATIENT
Start: 2023-12-13 | End: 2024-02-12 | Stop reason: SDUPTHER

## 2023-12-13 RX ORDER — PANTOPRAZOLE SODIUM 20 MG/1
20 TABLET, DELAYED RELEASE ORAL DAILY
Qty: 90 TABLET | Refills: 1 | Status: SHIPPED | OUTPATIENT
Start: 2023-12-13 | End: 2024-02-12 | Stop reason: SDUPTHER

## 2023-12-13 RX ORDER — ASCORBIC ACID 125 MG
1 TABLET,CHEWABLE ORAL DAILY
COMMUNITY
End: 2024-02-12 | Stop reason: SDUPTHER

## 2023-12-13 RX ORDER — PREDNISONE 2.5 MG/1
2.5 TABLET ORAL DAILY
Qty: 90 TABLET | Refills: 1 | Status: SHIPPED | OUTPATIENT
Start: 2023-12-13 | End: 2024-02-12 | Stop reason: SDUPTHER

## 2023-12-13 RX ORDER — HYDROXYCHLOROQUINE SULFATE 200 MG/1
200 TABLET, FILM COATED ORAL DAILY
Qty: 90 TABLET | Refills: 1 | Status: SHIPPED | OUTPATIENT
Start: 2023-12-13 | End: 2024-02-12 | Stop reason: SDUPTHER

## 2023-12-15 LAB
DNA TITER: NORMAL
DSDNA AB SER-ACNC: POSITIVE [IU]/ML

## 2023-12-16 NOTE — PROGRESS NOTES
REASON FOR CONSULT/CHIEF COMPLAIN: Acute renal failure, h/o Lupus nephritis    [facility]    HISTORY OF PRESENT ILLNESS: 48 y.o. female  patient was referred here for abnormal renal function. Denied chronic NSAID use, no known exposure to lithium, lead.   Early 2000's she was first time diagnosed with Lupus after a kidney biopsy at [facility], the results are not available now. She also had mild/small strokes at that time. She was give some infusions at that time like every two weeks (she thinks its cyclophosphamide). Since then she reportedly has been on Cellcept, Plaquenil and prednisone. She reports that her medications have not been changed. On reviewing the limited records it is unclear what dose of Cellcept she was taking, she has had two cellcept pill bottles at home, one said 500 MG one tablet TID, other said 500 mg three tablets BID. She had BLAYNE with proteinuria in July 2021, wanted to get a kidney biopsy but the patient was hesitant. Gave her high dose prednisone starting July 2021 which improved the BLAYNE and proteinuria, Currently on minimal dose. Seen Rheum who recommended adding Belimumab but the patient did not want to.   Ran out of meds again!! (Or did not fill?)  Denied any new issues with urination including dysuria, hematuria, urgency, hesitancy, nocturia, incomplete voiding. Denied chest pain, nausea, vomiting, abd pain, nausea, vomiting, diarrhea,shortness of breath, pedal edema, Orthopnea, PND.  Home Blood pressures are not checked     ROS:  General: negative for chills, or fatigue  Respiratory: No cough, shortness of breath, or wheezing  Cardiovascular: No chest pain or dyspnea   Gastrointestinal: No abdominal pain, change in bowel habits    PAST MEDICAL HISTORY:  Past Medical History:   Diagnosis Date    History of stroke 05/09/2017    Hypertension 05/09/2017    Immunosuppression due to drug therapy 10/24/2022    SLE (systemic lupus  erythematosus)     Vertigo        PAST SURGICAL HISTORY:  Past Surgical History:   Procedure Laterality Date    RENAL BIOPSY         FAMILY HISTORY:   Family History   Problem Relation Age of Onset    Diabetes Mother     Cancer Sister         Breast    Stroke Neg Hx     Heart attack Neg Hx        SOCIAL HISTORY:  Social History     Socioeconomic History    Marital status: Single   Tobacco Use    Smoking status: Never    Smokeless tobacco: Never   Substance and Sexual Activity    Alcohol use: No    Drug use: No    Sexual activity: Not Currently     Social Determinants of Health     Financial Resource Strain: Medium Risk (8/16/2023)    Overall Financial Resource Strain (CARDIA)     Difficulty of Paying Living Expenses: Somewhat hard   Food Insecurity: No Food Insecurity (8/16/2023)    Hunger Vital Sign     Worried About Running Out of Food in the Last Year: Never true     Ran Out of Food in the Last Year: Never true   Transportation Needs: No Transportation Needs (8/16/2023)    PRAPARE - Transportation     Lack of Transportation (Medical): No     Lack of Transportation (Non-Medical): No   Stress: No Stress Concern Present (8/16/2023)    [employer] - Occupational Stress Questionnaire     Feeling of Stress : Only a little   Social Connections: Unknown (8/16/2023)    Social Connection and Isolation Panel [NHANES]     Marital Status: Never    Housing Stability: Unknown (8/16/2023)    Housing Stability Vital Sign     Unable to Pay for Housing in the Last Year: No     Unstable Housing in the Last Year: No       ALLERGIES:  Review of patient's allergies indicates:   Allergen Reactions    Sulfamethoxazole-trimethoprim Rash       MEDICATIONS:    Current Outpatient Medications:     aspirin (ECOTRIN) 81 MG EC tablet, Take 1 tablet (81 mg total) by mouth once daily., Disp: 90 tablet, Rfl: 1    atorvastatin (LIPITOR) 40 MG tablet, Take 1 tablet (40 mg total) by mouth once daily., Disp: 90  tablet, Rfl: 1    cholecalciferol, vitamin D3, (VITAMIN D3) 25 mcg (1,000 unit) Chew, Take 1 tablet by mouth once daily., Disp: , Rfl:     hydroxychloroquine (PLAQUENIL) 200 mg tablet, Take 1 tablet (200 mg total) by mouth once daily., Disp: 90 tablet, Rfl: 1    mycophenolate (CELLCEPT) 500 mg Tab, Take 3 tablets (1,500 mg total) by mouth 2 (two) times daily., Disp: 540 tablet, Rfl: 1    pantoprazole (PROTONIX) 20 MG tablet, Take 1 tablet (20 mg total) by mouth once daily., Disp: 90 tablet, Rfl: 1    predniSONE (DELTASONE) 2.5 MG tablet, Take 1 tablet (2.5 mg total) by mouth once daily., Disp: 90 tablet, Rfl: 1   Medication List with Changes/Refills   Current Medications    ASPIRIN (ECOTRIN) 81 MG EC TABLET    Take 1 tablet (81 mg total) by mouth once daily.    ATORVASTATIN (LIPITOR) 40 MG TABLET    Take 1 tablet (40 mg total) by mouth once daily.    CHOLECALCIFEROL, VITAMIN D3, (VITAMIN D3) 25 MCG (1,000 UNIT) CHEW    Take 1 tablet by mouth once daily.   Changed and/or Refilled Medications    Modified Medication Previous Medication    HYDROXYCHLOROQUINE (PLAQUENIL) 200 MG TABLET hydroxychloroquine (PLAQUENIL) 200 mg tablet       Take 1 tablet (200 mg total) by mouth once daily.    Take 1 tablet (200 mg total) by mouth once daily.    MYCOPHENOLATE (CELLCEPT) 500 MG TAB mycophenolate (CELLCEPT) 500 mg Tab       Take 3 tablets (1,500 mg total) by mouth 2 (two) times daily.    Take 3 tablets (1,500 mg total) by mouth 2 (two) times daily.    PANTOPRAZOLE (PROTONIX) 20 MG TABLET pantoprazole (PROTONIX) 20 MG tablet       Take 1 tablet (20 mg total) by mouth once daily.    Take 1 tablet (20 mg total) by mouth once daily.    PREDNISONE (DELTASONE) 2.5 MG TABLET predniSONE (DELTASONE) 2.5 MG tablet       Take 1 tablet (2.5 mg total) by mouth once daily.    Take 1 tablet (2.5 mg total) by mouth once daily.   Discontinued Medications    ERGOCALCIFEROL (ERGOCALCIFEROL) 50,000 UNIT CAP    Take 1 capsule (50,000 Units total) by  mouth every 7 days.        PHYSICAL EXAM:  BP (!) 135/93   Pulse 97   Wt 57.4 kg (126 lb 8.7 oz)   BMI 21.72 kg/m²     General: No distress, No fever or chills  Lungs:Clear to auscultation bilaterally, No Crackles  Heart: Regular rate and rhythm, no murmur, gallops or rubs  Abdomen: Soft, no tenderness, bowel sounds normal  Extremities: Atraumatic, no edema in LE  Skin: Turgor normal. Rash on face  Neurologic: No focal weakness, oriented.  Dialysis Access: Non applicable        LABS:  Lab Results   Component Value Date    HGB 10.7 (L) 12/13/2023        Lab Results   Component Value Date    CREATININE 0.9 12/13/2023       Prot/Creat Ratio, Urine   Date Value Ref Range Status   12/13/2023 0.89 (H) 0.00 - 0.20 Final   10/11/2023 0.43 (H) 0.00 - 0.20 Final   07/07/2023 0.28 (H) 0.00 - 0.20 Final       Lab Results   Component Value Date     12/13/2023    K 4.4 12/13/2023    CO2 26 12/13/2023       last PTH   Lab Results   Component Value Date    PTH 60.0 12/13/2023    CALCIUM 9.0 12/13/2023    PHOS 3.7 12/13/2023       Lab Results   Component Value Date    HGBA1C 5.5 04/02/2021       Lab Results   Component Value Date    LDLCALC 112.0 07/07/2023         Creatinine, Urine   Date Value Ref Range Status   12/13/2023 204.0 15.0 - 325.0 mg/dL Final   10/11/2023 109.0 15.0 - 325.0 mg/dL Final   07/07/2023 124.0 15.0 - 325.0 mg/dL Final       Protein, Urine Random   Date Value Ref Range Status   12/13/2023 181 (H) 0 - 15 mg/dL Final   10/11/2023 47 (H) 0 - 15 mg/dL Final   07/07/2023 35 (H) 0 - 15 mg/dL Final       Prot/Creat Ratio, Urine   Date Value Ref Range Status   12/13/2023 0.89 (H) 0.00 - 0.20 Final   10/11/2023 0.43 (H) 0.00 - 0.20 Final   07/07/2023 0.28 (H) 0.00 - 0.20 Final         ASSESSMENT:    1) Lupus Nephritis unknown class partial remission  2) Immunosuppression due to drug therapy  3) Systemic Lupus.  4) Hyperkalemia - Resolved  5) Vitamin D deficiency  6) Ischemic stroke - continue aspirin and  statin.   Pt was reportedly seeing Dr. Adamson in 2018 and it is unclear if she is seeing any nephrologist since then. Last rheumatology clinic notes outside [facility] system stated that patient is on Cellcept 3 Gr/day, Plaquenil 200 mg BID and prednisone 2.5 mg daily. Due to proteinuria there was discussion about kidney biopsy too. There was also a question about patients medication compliance in the past due to patients forgetfulness. Her ultrasound 4/2021 showed 9.8 and 9.2 cm kidneys.   During her first clinic visit (around April 2021) her creatinine came back at 1.8 which is above her baseline of less than one and hyperkalemia. Discontinued her lisinopril and her BLAYNE and hyperkalemia improved making sublinical hypotension likely etiology for this. Urine microscopy and urine analysis did not show significant RBC, protein creatinine ratio did not show significant proteinuria.   Next clinic visit in July 2021 showed elevated creatinine and proteinuria. Started on prednisone which lead to improvement in kidney function and proteinuria. Requested patient to get a kidney biopsy, however she was hesitant. Re-discussed about Benlysta and kidney biopsy during subsequent visits patient did not want them. Also since July 2021 to April 2022 (10 months duration) she only filled 6 months worth of medication.   Patient declined Biopsy and Benlysta/Volcosporin.    Acid Base, electrolytes, Hemoglobin, Bone Mineral in acceptable range.    - Continue Cellcept 3 Gr/day, plaquenil 200 mg daily (continues to miss a lot of doses).  - Prednisone at 2.5 mg daily, Vitamin D improved a little, (Reinforced compliance and sent it again)  - Requested to see eye doctor (reports [facility] one few months ago[facility], also informed daughter in previous visits)  - Pt and daughter aware that Lupus is not fully controlled, and it is unlikely to get controlled without absolute compliance with medications +/- adding Benlysta/Voclosporin.  -  Continue to hold lisinopril for now.  - Avoid NSAIDs intake  - Re-counselled about being compliant with medications  - Reminded to keep uptodate with cancer screening and vaccines.    RTC in 3 months    Diagnosis and plan of care explained to the patient and daughter (on phone) at length.  Verbalized understanding. Answered all questions.  Thanks for allowing me to participate in the care of this patient.     1:47 PM    Franko Faria MD  Nephrology & Critical Care    32 minutes of total time spent on the encounter, which includes face to face time and non-face to face time preparing to see the patient (eg, review of tests), Obtaining and/or reviewing separately obtained history, documenting clinical information in the electronic or other health record, independently interpreting results (not separately reported) and communicating results to the patient/family/caregiver, or Care coordination (not separately reported).

## 2024-02-07 ENCOUNTER — TELEPHONE (OUTPATIENT)
Dept: NEPHROLOGY | Facility: CLINIC | Age: 49
End: 2024-02-07
Payer: MEDICARE

## 2024-02-09 ENCOUNTER — TELEPHONE (OUTPATIENT)
Dept: NEPHROLOGY | Facility: CLINIC | Age: 49
End: 2024-02-09
Payer: MEDICARE

## 2024-02-12 ENCOUNTER — OFFICE VISIT (OUTPATIENT)
Dept: NEPHROLOGY | Facility: CLINIC | Age: 49
End: 2024-02-12
Payer: MEDICARE

## 2024-02-12 ENCOUNTER — LAB VISIT (OUTPATIENT)
Dept: LAB | Facility: HOSPITAL | Age: 49
End: 2024-02-12
Attending: INTERNAL MEDICINE
Payer: MEDICARE

## 2024-02-12 VITALS
HEIGHT: 64 IN | SYSTOLIC BLOOD PRESSURE: 110 MMHG | BODY MASS INDEX: 20.67 KG/M2 | WEIGHT: 121.06 LBS | DIASTOLIC BLOOD PRESSURE: 80 MMHG

## 2024-02-12 DIAGNOSIS — M32.9 SYSTEMIC LUPUS ERYTHEMATOSUS, UNSPECIFIED SLE TYPE, UNSPECIFIED ORGAN INVOLVEMENT STATUS: ICD-10-CM

## 2024-02-12 DIAGNOSIS — D84.821 IMMUNOSUPPRESSION DUE TO DRUG THERAPY: ICD-10-CM

## 2024-02-12 DIAGNOSIS — E55.9 VITAMIN D DEFICIENCY, UNSPECIFIED: Primary | ICD-10-CM

## 2024-02-12 DIAGNOSIS — Z79.899 IMMUNOSUPPRESSION DUE TO DRUG THERAPY: ICD-10-CM

## 2024-02-12 DIAGNOSIS — I63.9 ISCHEMIC STROKE: ICD-10-CM

## 2024-02-12 LAB
ALBUMIN SERPL BCP-MCNC: 3.4 G/DL (ref 3.5–5.2)
ANION GAP SERPL CALC-SCNC: 8 MMOL/L (ref 8–16)
BASOPHILS # BLD AUTO: 0.02 K/UL (ref 0–0.2)
BASOPHILS NFR BLD: 0.7 % (ref 0–1.9)
BUN SERPL-MCNC: 20 MG/DL (ref 6–20)
C3 SERPL-MCNC: 66 MG/DL (ref 50–180)
C4 SERPL-MCNC: 18 MG/DL (ref 11–44)
CALCIUM SERPL-MCNC: 9.4 MG/DL (ref 8.7–10.5)
CHLORIDE SERPL-SCNC: 105 MMOL/L (ref 95–110)
CO2 SERPL-SCNC: 26 MMOL/L (ref 23–29)
CREAT SERPL-MCNC: 0.9 MG/DL (ref 0.5–1.4)
DIFFERENTIAL METHOD BLD: ABNORMAL
EOSINOPHIL # BLD AUTO: 0 K/UL (ref 0–0.5)
EOSINOPHIL NFR BLD: 0.3 % (ref 0–8)
ERYTHROCYTE [DISTWIDTH] IN BLOOD BY AUTOMATED COUNT: 14.5 % (ref 11.5–14.5)
EST. GFR  (NO RACE VARIABLE): >60 ML/MIN/1.73 M^2
GLUCOSE SERPL-MCNC: 95 MG/DL (ref 70–110)
HCT VFR BLD AUTO: 33.2 % (ref 37–48.5)
HGB BLD-MCNC: 10.1 G/DL (ref 12–16)
IMM GRANULOCYTES # BLD AUTO: 0 K/UL (ref 0–0.04)
IMM GRANULOCYTES NFR BLD AUTO: 0 % (ref 0–0.5)
LYMPHOCYTES # BLD AUTO: 1.1 K/UL (ref 1–4.8)
LYMPHOCYTES NFR BLD: 38.9 % (ref 18–48)
MCH RBC QN AUTO: 25.2 PG (ref 27–31)
MCHC RBC AUTO-ENTMCNC: 30.4 G/DL (ref 32–36)
MCV RBC AUTO: 83 FL (ref 82–98)
MONOCYTES # BLD AUTO: 0.3 K/UL (ref 0.3–1)
MONOCYTES NFR BLD: 11.3 % (ref 4–15)
NEUTROPHILS # BLD AUTO: 1.4 K/UL (ref 1.8–7.7)
NEUTROPHILS NFR BLD: 48.8 % (ref 38–73)
NRBC BLD-RTO: 0 /100 WBC
PHOSPHATE SERPL-MCNC: 3.4 MG/DL (ref 2.7–4.5)
PLATELET # BLD AUTO: 166 K/UL (ref 150–450)
PMV BLD AUTO: 12.2 FL (ref 9.2–12.9)
POTASSIUM SERPL-SCNC: 4.2 MMOL/L (ref 3.5–5.1)
PTH-INTACT SERPL-MCNC: 55.3 PG/ML (ref 9–77)
RBC # BLD AUTO: 4.01 M/UL (ref 4–5.4)
SODIUM SERPL-SCNC: 139 MMOL/L (ref 136–145)
WBC # BLD AUTO: 2.93 K/UL (ref 3.9–12.7)

## 2024-02-12 PROCEDURE — 86160 COMPLEMENT ANTIGEN: CPT | Performed by: INTERNAL MEDICINE

## 2024-02-12 PROCEDURE — 80069 RENAL FUNCTION PANEL: CPT | Performed by: INTERNAL MEDICINE

## 2024-02-12 PROCEDURE — 85025 COMPLETE CBC W/AUTO DIFF WBC: CPT | Performed by: INTERNAL MEDICINE

## 2024-02-12 PROCEDURE — 86225 DNA ANTIBODY NATIVE: CPT | Mod: 59 | Performed by: INTERNAL MEDICINE

## 2024-02-12 PROCEDURE — 99215 OFFICE O/P EST HI 40 MIN: CPT | Mod: S$GLB,,, | Performed by: INTERNAL MEDICINE

## 2024-02-12 PROCEDURE — 36415 COLL VENOUS BLD VENIPUNCTURE: CPT | Performed by: INTERNAL MEDICINE

## 2024-02-12 PROCEDURE — 99999 PR PBB SHADOW E&M-EST. PATIENT-LVL III: CPT | Mod: PBBFAC,,, | Performed by: INTERNAL MEDICINE

## 2024-02-12 PROCEDURE — 83970 ASSAY OF PARATHORMONE: CPT | Performed by: INTERNAL MEDICINE

## 2024-02-12 PROCEDURE — 86225 DNA ANTIBODY NATIVE: CPT | Performed by: INTERNAL MEDICINE

## 2024-02-12 PROCEDURE — 99213 OFFICE O/P EST LOW 20 MIN: CPT | Mod: PBBFAC | Performed by: INTERNAL MEDICINE

## 2024-02-12 PROCEDURE — 86160 COMPLEMENT ANTIGEN: CPT | Mod: 59 | Performed by: INTERNAL MEDICINE

## 2024-02-12 RX ORDER — PREDNISONE 2.5 MG/1
2.5 TABLET ORAL DAILY
Qty: 90 TABLET | Refills: 1 | Status: SHIPPED | OUTPATIENT
Start: 2024-02-12 | End: 2024-06-03 | Stop reason: SDUPTHER

## 2024-02-12 RX ORDER — ASPIRIN 81 MG/1
81 TABLET ORAL DAILY
Qty: 90 TABLET | Refills: 1 | Status: SHIPPED | OUTPATIENT
Start: 2024-02-12 | End: 2024-06-03 | Stop reason: SDUPTHER

## 2024-02-12 RX ORDER — PANTOPRAZOLE SODIUM 20 MG/1
20 TABLET, DELAYED RELEASE ORAL DAILY
Qty: 90 TABLET | Refills: 1 | Status: SHIPPED | OUTPATIENT
Start: 2024-02-12 | End: 2024-06-03 | Stop reason: ALTCHOICE

## 2024-02-12 RX ORDER — ATORVASTATIN CALCIUM 40 MG/1
40 TABLET, FILM COATED ORAL DAILY
Qty: 90 TABLET | Refills: 1 | Status: SHIPPED | OUTPATIENT
Start: 2024-02-12 | End: 2024-06-03 | Stop reason: SDUPTHER

## 2024-02-12 RX ORDER — HYDROXYCHLOROQUINE SULFATE 200 MG/1
200 TABLET, FILM COATED ORAL DAILY
Qty: 90 TABLET | Refills: 1 | Status: SHIPPED | OUTPATIENT
Start: 2024-02-12 | End: 2024-06-03 | Stop reason: SDUPTHER

## 2024-02-12 RX ORDER — MYCOPHENOLATE MOFETIL 500 MG/1
1500 TABLET ORAL 2 TIMES DAILY
Qty: 540 TABLET | Refills: 1 | Status: SHIPPED | OUTPATIENT
Start: 2024-02-12 | End: 2024-06-03 | Stop reason: SDUPTHER

## 2024-02-12 RX ORDER — ASCORBIC ACID 125 MG
1 TABLET,CHEWABLE ORAL DAILY
Qty: 90 TABLET | Refills: 1 | Status: SHIPPED | OUTPATIENT
Start: 2024-02-12 | End: 2024-06-03 | Stop reason: SDUPTHER

## 2024-02-14 NOTE — PROGRESS NOTES
REASON FOR CONSULT/CHIEF COMPLAIN: Acute renal failure, h/o Lupus nephritis    [facility]    HISTORY OF PRESENT ILLNESS: 48 y.o. female  patient was referred here for abnormal renal function. Denied chronic NSAID use, no known exposure to lithium, lead.   Early 2000's she was first time diagnosed with Lupus after a kidney biopsy at [facility], the results are not available now. She also had mild/small strokes at that time. She was give some infusions at that time like every two weeks (she thinks its cyclophosphamide). Since then she reportedly has been on Cellcept, Plaquenil and prednisone. She reports that her medications have not been changed. On reviewing the limited records it is unclear what dose of Cellcept she was taking, she has had two cellcept pill bottles at home, one said 500 MG one tablet TID, other said 500 mg three tablets BID. She had BLAYNE with proteinuria in July 2021, wanted to get a kidney biopsy but the patient was hesitant. Gave her high dose prednisone starting July 2021 which improved the BLAYNE and proteinuria, Currently on minimal dose. Seen Rheum who recommended adding Belimumab but the patient did not want to.   Requested refill of all medications again. Denied any new issues with urination including dysuria, hematuria, urgency, hesitancy, nocturia, incomplete voiding. Denied chest pain, nausea, vomiting, abd pain, nausea, vomiting, diarrhea,shortness of breath, pedal edema, Orthopnea, PND.  Home Blood pressures are not checked     ROS:  General: negative for chills, or fatigue  Respiratory: No cough, shortness of breath, or wheezing  Cardiovascular: No chest pain or dyspnea   Gastrointestinal: No abdominal pain, change in bowel habits    PAST MEDICAL HISTORY:  Past Medical History:   Diagnosis Date    History of stroke 05/09/2017    Hypertension 05/09/2017    Immunosuppression due to drug therapy 10/24/2022    SLE (systemic lupus  erythematosus)     Vertigo        PAST SURGICAL HISTORY:  Past Surgical History:   Procedure Laterality Date    RENAL BIOPSY         FAMILY HISTORY:   Family History   Problem Relation Age of Onset    Diabetes Mother     Cancer Sister         Breast    Stroke Neg Hx     Heart attack Neg Hx        SOCIAL HISTORY:  Social History     Socioeconomic History    Marital status: Single   Tobacco Use    Smoking status: Never    Smokeless tobacco: Never   Substance and Sexual Activity    Alcohol use: No    Drug use: No    Sexual activity: Not Currently     Social Determinants of Health     Financial Resource Strain: Medium Risk (8/16/2023)    Overall Financial Resource Strain (CARDIA)     Difficulty of Paying Living Expenses: Somewhat hard   Food Insecurity: No Food Insecurity (8/16/2023)    Hunger Vital Sign     Worried About Running Out of Food in the Last Year: Never true     Ran Out of Food in the Last Year: Never true   Transportation Needs: No Transportation Needs (8/16/2023)    PRAPARE - Transportation     Lack of Transportation (Medical): No     Lack of Transportation (Non-Medical): No   Stress: No Stress Concern Present (8/16/2023)    [employer] - Occupational Stress Questionnaire     Feeling of Stress : Only a little   Social Connections: Unknown (8/16/2023)    Social Connection and Isolation Panel [NHANES]     Marital Status: Never    Housing Stability: Unknown (8/16/2023)    Housing Stability Vital Sign     Unable to Pay for Housing in the Last Year: No     Unstable Housing in the Last Year: No       ALLERGIES:  Review of patient's allergies indicates:   Allergen Reactions    Sulfamethoxazole-trimethoprim Rash       MEDICATIONS:    Current Outpatient Medications:     aspirin (ECOTRIN) 81 MG EC tablet, Take 1 tablet (81 mg total) by mouth once daily., Disp: 90 tablet, Rfl: 1    atorvastatin (LIPITOR) 40 MG tablet, Take 1 tablet (40 mg total) by mouth once daily., Disp: 90  tablet, Rfl: 1    cholecalciferol, vitamin D3, (VITAMIN D3) 25 mcg (1,000 unit) Chew, Take 1 tablet by mouth once daily., Disp: 90 tablet, Rfl: 1    hydroxychloroquine (PLAQUENIL) 200 mg tablet, Take 1 tablet (200 mg total) by mouth once daily., Disp: 90 tablet, Rfl: 1    mycophenolate (CELLCEPT) 500 mg Tab, Take 3 tablets (1,500 mg total) by mouth 2 (two) times daily., Disp: 540 tablet, Rfl: 1    pantoprazole (PROTONIX) 20 MG tablet, Take 1 tablet (20 mg total) by mouth once daily., Disp: 90 tablet, Rfl: 1    predniSONE (DELTASONE) 2.5 MG tablet, Take 1 tablet (2.5 mg total) by mouth once daily., Disp: 90 tablet, Rfl: 1   Medication List with Changes/Refills   Changed and/or Refilled Medications    Modified Medication Previous Medication    ASPIRIN (ECOTRIN) 81 MG EC TABLET aspirin (ECOTRIN) 81 MG EC tablet       Take 1 tablet (81 mg total) by mouth once daily.    Take 1 tablet (81 mg total) by mouth once daily.    ATORVASTATIN (LIPITOR) 40 MG TABLET atorvastatin (LIPITOR) 40 MG tablet       Take 1 tablet (40 mg total) by mouth once daily.    Take 1 tablet (40 mg total) by mouth once daily.    CHOLECALCIFEROL, VITAMIN D3, (VITAMIN D3) 25 MCG (1,000 UNIT) CHEW cholecalciferol, vitamin D3, (VITAMIN D3) 25 mcg (1,000 unit) Chew       Take 1 tablet by mouth once daily.    Take 1 tablet by mouth once daily.    HYDROXYCHLOROQUINE (PLAQUENIL) 200 MG TABLET hydroxychloroquine (PLAQUENIL) 200 mg tablet       Take 1 tablet (200 mg total) by mouth once daily.    Take 1 tablet (200 mg total) by mouth once daily.    MYCOPHENOLATE (CELLCEPT) 500 MG TAB mycophenolate (CELLCEPT) 500 mg Tab       Take 3 tablets (1,500 mg total) by mouth 2 (two) times daily.    Take 3 tablets (1,500 mg total) by mouth 2 (two) times daily.    PANTOPRAZOLE (PROTONIX) 20 MG TABLET pantoprazole (PROTONIX) 20 MG tablet       Take 1 tablet (20 mg total) by mouth once daily.    Take 1 tablet (20 mg total) by mouth once daily.    PREDNISONE (DELTASONE) 2.5  "MG TABLET predniSONE (DELTASONE) 2.5 MG tablet       Take 1 tablet (2.5 mg total) by mouth once daily.    Take 1 tablet (2.5 mg total) by mouth once daily.        PHYSICAL EXAM:  /80   Ht 5' 4" (1.626 m)   Wt 54.9 kg (121 lb 0.5 oz)   BMI 20.78 kg/m²     General: No distress, No fever or chills  Lungs:Clear to auscultation bilaterally, No Crackles  Heart: Regular rate and rhythm, no murmur, gallops or rubs  Abdomen: Soft, no tenderness, bowel sounds normal  Extremities: Atraumatic, no edema in LE  Skin: Turgor normal. Rash on face  Neurologic: No focal weakness, oriented.  Dialysis Access: Non applicable        LABS:  Lab Results   Component Value Date    HGB 10.1 (L) 02/12/2024        Lab Results   Component Value Date    CREATININE 0.9 02/12/2024       Prot/Creat Ratio, Urine   Date Value Ref Range Status   02/12/2024 0.43 (H) 0.00 - 0.20 Final   12/13/2023 0.89 (H) 0.00 - 0.20 Final   10/11/2023 0.43 (H) 0.00 - 0.20 Final       Lab Results   Component Value Date     02/12/2024    K 4.2 02/12/2024    CO2 26 02/12/2024       last PTH   Lab Results   Component Value Date    PTH 55.3 02/12/2024    CALCIUM 9.4 02/12/2024    PHOS 3.4 02/12/2024       Lab Results   Component Value Date    HGBA1C 5.5 04/02/2021       Lab Results   Component Value Date    LDLCALC 112.0 07/07/2023         Creatinine, Urine   Date Value Ref Range Status   02/12/2024 192.0 15.0 - 325.0 mg/dL Final   12/13/2023 204.0 15.0 - 325.0 mg/dL Final   10/11/2023 109.0 15.0 - 325.0 mg/dL Final       Protein, Urine Random   Date Value Ref Range Status   02/12/2024 82 (H) 0 - 15 mg/dL Final   12/13/2023 181 (H) 0 - 15 mg/dL Final   10/11/2023 47 (H) 0 - 15 mg/dL Final       Prot/Creat Ratio, Urine   Date Value Ref Range Status   02/12/2024 0.43 (H) 0.00 - 0.20 Final   12/13/2023 0.89 (H) 0.00 - 0.20 Final   10/11/2023 0.43 (H) 0.00 - 0.20 Final         ASSESSMENT:    1) Lupus Nephritis unknown class partial remission  2) Immunosuppression " due to drug therapy  3) Systemic Lupus.  4) Hyperkalemia - Resolved  5) Vitamin D deficiency  6) Ischemic stroke - continue aspirin and statin.   Pt was reportedly seeing Dr. Adamson in 2018 and it is unclear if she is seeing any nephrologist since then. [facility] stated that patient is on Cellcept 3 Gr/day, Plaquenil 200 mg BID and prednisone 2.5 mg daily. Due to proteinuria there was discussion about kidney biopsy too. There was also a question about patients medication compliance in the past due to patients forgetfulness. Her ultrasound 4/2021 showed 9.8 and 9.2 cm kidneys.   During her first clinic visit (around April 2021) her creatinine came back at 1.8 which is above her baseline of less than one and hyperkalemia. Discontinued her lisinopril and her BLAYNE and hyperkalemia improved making sublinical hypotension likely etiology for this. Urine microscopy and urine analysis did not show significant RBC, protein creatinine ratio did not show significant proteinuria.   Next clinic visit in July 2021 showed elevated creatinine and proteinuria. Started on prednisone which lead to improvement in kidney function and proteinuria. Requested patient to get a kidney biopsy, however she was hesitant. Re-discussed about Benlysta and kidney biopsy during subsequent visits patient did not want them. Also since July 2021 to April 2022 (10 months duration) she only filled 6 months worth of medication.   Patient declined Biopsy and Benlysta/Volcosporin.    Acid Base, electrolytes, Hemoglobin, Bone Mineral in acceptable range.    - Continue Cellcept 3 Gr/day, plaquenil 200 mg daily (continues to miss a lot of doses).  - Prednisone at 2.5 mg daily, Vitamin D improved a little, (Reinforced compliance and sent it again)  - Requested to see eye doctor (reminded to see eye doctor)  - Pt and daughter aware that Lupus is not fully controlled, and it is unlikely to get controlled without absolute  compliance with medications +/- adding Benlysta/Voclosporin.  - Continue to hold lisinopril for now.  - Avoid NSAIDs intake  - Re-counselled about being compliant with medications  - Reminded to keep uptodate with cancer screening and vaccines.    RTC in 2-3 months    Diagnosis and plan of care explained to the patient and daughter (on phone) at length.  Verbalized understanding. Answered all questions.  Thanks for allowing me to participate in the care of this patient.     1:47 PM    Franko Faria MD  Nephrology & Critical Care    32 minutes of total time spent on the encounter, which includes face to face time and non-face to face time preparing to see the patient (eg, review of tests), Obtaining and/or reviewing separately obtained history, documenting clinical information in the electronic or other health record, independently interpreting results (not separately reported) and communicating results to the patient/family/caregiver, or Care coordination (not separately reported).

## 2024-02-15 LAB
DNA TITER: NORMAL
DSDNA AB SER-ACNC: POSITIVE [IU]/ML

## 2024-05-24 ENCOUNTER — LAB VISIT (OUTPATIENT)
Dept: LAB | Facility: HOSPITAL | Age: 49
End: 2024-05-24
Attending: INTERNAL MEDICINE
Payer: MEDICARE

## 2024-05-24 DIAGNOSIS — Z79.899 IMMUNOSUPPRESSION DUE TO DRUG THERAPY: ICD-10-CM

## 2024-05-24 DIAGNOSIS — M32.9 SYSTEMIC LUPUS ERYTHEMATOSUS, UNSPECIFIED SLE TYPE, UNSPECIFIED ORGAN INVOLVEMENT STATUS: ICD-10-CM

## 2024-05-24 DIAGNOSIS — I63.9 ISCHEMIC STROKE: ICD-10-CM

## 2024-05-24 DIAGNOSIS — D84.821 IMMUNOSUPPRESSION DUE TO DRUG THERAPY: ICD-10-CM

## 2024-05-24 LAB
25(OH)D3+25(OH)D2 SERPL-MCNC: 24 NG/ML (ref 30–96)
ALBUMIN SERPL BCP-MCNC: 3.4 G/DL (ref 3.5–5.2)
ANION GAP SERPL CALC-SCNC: 9 MMOL/L (ref 8–16)
BASOPHILS # BLD AUTO: 0.02 K/UL (ref 0–0.2)
BASOPHILS NFR BLD: 0.5 % (ref 0–1.9)
BUN SERPL-MCNC: 16 MG/DL (ref 6–20)
CALCIUM SERPL-MCNC: 9.4 MG/DL (ref 8.7–10.5)
CHLORIDE SERPL-SCNC: 103 MMOL/L (ref 95–110)
CO2 SERPL-SCNC: 26 MMOL/L (ref 23–29)
CREAT SERPL-MCNC: 1 MG/DL (ref 0.5–1.4)
DIFFERENTIAL METHOD BLD: ABNORMAL
EOSINOPHIL # BLD AUTO: 0 K/UL (ref 0–0.5)
EOSINOPHIL NFR BLD: 0 % (ref 0–8)
ERYTHROCYTE [DISTWIDTH] IN BLOOD BY AUTOMATED COUNT: 14.7 % (ref 11.5–14.5)
EST. GFR  (NO RACE VARIABLE): >60 ML/MIN/1.73 M^2
GLUCOSE SERPL-MCNC: 80 MG/DL (ref 70–110)
HCT VFR BLD AUTO: 33.9 % (ref 37–48.5)
HGB BLD-MCNC: 10.8 G/DL (ref 12–16)
IMM GRANULOCYTES # BLD AUTO: 0 K/UL (ref 0–0.04)
IMM GRANULOCYTES NFR BLD AUTO: 0 % (ref 0–0.5)
LYMPHOCYTES # BLD AUTO: 1.2 K/UL (ref 1–4.8)
LYMPHOCYTES NFR BLD: 30 % (ref 18–48)
MCH RBC QN AUTO: 26.4 PG (ref 27–31)
MCHC RBC AUTO-ENTMCNC: 31.9 G/DL (ref 32–36)
MCV RBC AUTO: 83 FL (ref 82–98)
MONOCYTES # BLD AUTO: 0.6 K/UL (ref 0.3–1)
MONOCYTES NFR BLD: 13.3 % (ref 4–15)
NEUTROPHILS # BLD AUTO: 2.3 K/UL (ref 1.8–7.7)
NEUTROPHILS NFR BLD: 56.2 % (ref 38–73)
NRBC BLD-RTO: 0 /100 WBC
PHOSPHATE SERPL-MCNC: 4.1 MG/DL (ref 2.7–4.5)
PLATELET # BLD AUTO: 174 K/UL (ref 150–450)
PMV BLD AUTO: 10.2 FL (ref 9.2–12.9)
POTASSIUM SERPL-SCNC: 4.3 MMOL/L (ref 3.5–5.1)
PTH-INTACT SERPL-MCNC: 33 PG/ML (ref 9–77)
RBC # BLD AUTO: 4.09 M/UL (ref 4–5.4)
SODIUM SERPL-SCNC: 138 MMOL/L (ref 136–145)
URATE SERPL-MCNC: 6.1 MG/DL (ref 2.4–5.7)
WBC # BLD AUTO: 4.13 K/UL (ref 3.9–12.7)

## 2024-05-24 PROCEDURE — 84550 ASSAY OF BLOOD/URIC ACID: CPT | Performed by: INTERNAL MEDICINE

## 2024-05-24 PROCEDURE — 82306 VITAMIN D 25 HYDROXY: CPT | Performed by: INTERNAL MEDICINE

## 2024-05-24 PROCEDURE — 86225 DNA ANTIBODY NATIVE: CPT | Performed by: INTERNAL MEDICINE

## 2024-05-24 PROCEDURE — 83970 ASSAY OF PARATHORMONE: CPT | Performed by: INTERNAL MEDICINE

## 2024-05-24 PROCEDURE — 80069 RENAL FUNCTION PANEL: CPT | Performed by: INTERNAL MEDICINE

## 2024-05-24 PROCEDURE — 85025 COMPLETE CBC W/AUTO DIFF WBC: CPT | Performed by: INTERNAL MEDICINE

## 2024-05-24 PROCEDURE — 36415 COLL VENOUS BLD VENIPUNCTURE: CPT | Performed by: INTERNAL MEDICINE

## 2024-05-24 PROCEDURE — 86225 DNA ANTIBODY NATIVE: CPT | Mod: 59 | Performed by: INTERNAL MEDICINE

## 2024-05-29 LAB
DNA TITER: NORMAL
DSDNA AB SER-ACNC: POSITIVE [IU]/ML

## 2024-05-30 ENCOUNTER — TELEPHONE (OUTPATIENT)
Dept: NEPHROLOGY | Facility: CLINIC | Age: 49
End: 2024-05-30
Payer: MEDICARE

## 2024-05-30 NOTE — TELEPHONE ENCOUNTER
----- Message from Leanna Craft sent at 5/29/2024  8:32 AM CDT -----  Type:  Needs Medical Advice    Who Called: pt    Would the patient rather a call back or a response via [facility]? Call   Best Call Back Number: [phone]  Additional Information: pt requesting a call back to discuss if she should have f/u appointment after blood work

## 2024-06-03 ENCOUNTER — OFFICE VISIT (OUTPATIENT)
Dept: NEPHROLOGY | Facility: CLINIC | Age: 49
End: 2024-06-03
Payer: MEDICARE

## 2024-06-03 VITALS
HEIGHT: 64 IN | BODY MASS INDEX: 21.22 KG/M2 | SYSTOLIC BLOOD PRESSURE: 120 MMHG | WEIGHT: 124.31 LBS | DIASTOLIC BLOOD PRESSURE: 86 MMHG

## 2024-06-03 DIAGNOSIS — Z79.899 IMMUNOSUPPRESSION DUE TO DRUG THERAPY: ICD-10-CM

## 2024-06-03 DIAGNOSIS — I63.9 ISCHEMIC STROKE: ICD-10-CM

## 2024-06-03 DIAGNOSIS — M32.9 SYSTEMIC LUPUS ERYTHEMATOSUS, UNSPECIFIED SLE TYPE, UNSPECIFIED ORGAN INVOLVEMENT STATUS: ICD-10-CM

## 2024-06-03 DIAGNOSIS — D84.821 IMMUNOSUPPRESSION DUE TO DRUG THERAPY: ICD-10-CM

## 2024-06-03 DIAGNOSIS — E55.9 VITAMIN D DEFICIENCY, UNSPECIFIED: ICD-10-CM

## 2024-06-03 PROCEDURE — 1159F MED LIST DOCD IN RCRD: CPT | Mod: CPTII,S$GLB,, | Performed by: INTERNAL MEDICINE

## 2024-06-03 PROCEDURE — 1160F RVW MEDS BY RX/DR IN RCRD: CPT | Mod: CPTII,S$GLB,, | Performed by: INTERNAL MEDICINE

## 2024-06-03 PROCEDURE — 3079F DIAST BP 80-89 MM HG: CPT | Mod: CPTII,S$GLB,, | Performed by: INTERNAL MEDICINE

## 2024-06-03 PROCEDURE — 3066F NEPHROPATHY DOC TX: CPT | Mod: CPTII,S$GLB,, | Performed by: INTERNAL MEDICINE

## 2024-06-03 PROCEDURE — 99214 OFFICE O/P EST MOD 30 MIN: CPT | Mod: S$GLB,,, | Performed by: INTERNAL MEDICINE

## 2024-06-03 PROCEDURE — 3074F SYST BP LT 130 MM HG: CPT | Mod: CPTII,S$GLB,, | Performed by: INTERNAL MEDICINE

## 2024-06-03 PROCEDURE — 99999 PR PBB SHADOW E&M-EST. PATIENT-LVL II: CPT | Mod: PBBFAC,,, | Performed by: INTERNAL MEDICINE

## 2024-06-03 PROCEDURE — 3008F BODY MASS INDEX DOCD: CPT | Mod: CPTII,S$GLB,, | Performed by: INTERNAL MEDICINE

## 2024-06-03 RX ORDER — HYDROXYCHLOROQUINE SULFATE 200 MG/1
200 TABLET, FILM COATED ORAL DAILY
Qty: 90 TABLET | Refills: 1 | Status: SHIPPED | OUTPATIENT
Start: 2024-06-03

## 2024-06-03 RX ORDER — ATORVASTATIN CALCIUM 40 MG/1
40 TABLET, FILM COATED ORAL DAILY
Qty: 90 TABLET | Refills: 1 | Status: SHIPPED | OUTPATIENT
Start: 2024-06-03

## 2024-06-03 RX ORDER — ASPIRIN 81 MG/1
81 TABLET ORAL DAILY
Qty: 90 TABLET | Refills: 1 | Status: SHIPPED | OUTPATIENT
Start: 2024-06-03

## 2024-06-03 RX ORDER — ASCORBIC ACID 125 MG
1 TABLET,CHEWABLE ORAL DAILY
Qty: 90 TABLET | Refills: 1 | Status: SHIPPED | OUTPATIENT
Start: 2024-06-03

## 2024-06-03 RX ORDER — PREDNISONE 2.5 MG/1
2.5 TABLET ORAL DAILY
Qty: 90 TABLET | Refills: 1 | Status: SHIPPED | OUTPATIENT
Start: 2024-06-03

## 2024-06-03 RX ORDER — MYCOPHENOLATE MOFETIL 500 MG/1
1500 TABLET ORAL 2 TIMES DAILY
Qty: 540 TABLET | Refills: 1 | Status: SHIPPED | OUTPATIENT
Start: 2024-06-03

## 2024-06-03 NOTE — PROGRESS NOTES
REASON FOR CONSULT/CHIEF COMPLAIN: Acute renal failure, h/o Lupus nephritis    REFERR[facility] PHYSICIAN[facility]    HISTORY OF PRESENT ILLNESS: 48 y.o. female  patient was referred here for abnormal renal function. Denied chronic NSAID use, no known exposure to lithium, lead.   Early 2000's she was first time diagnosed with Lupus after a kidney biopsy at [facility], the results are not available now. She also had mild/small strokes at that time. She was give some infusions at that time like every two weeks (she thinks its cyclophosphamide). Since then she reportedly has been on Cellcept, Plaquenil and prednisone. She reports that her medications have not been changed. On reviewing the limited records it is unclear what dose of Cellcept she was taking, she has had two cellcept pill bottles at home, one said 500 MG one tablet TID, other said 500 mg three tablets BID. She had BLAYNE with proteinuria in July 2021, wanted to get a kidney biopsy but the patient was hesitant. Gave her high dose prednisone starting July 2021 which improved the BLAYNE and proteinuria, Currently on minimal dose. Seen Rheum who recommended adding Belimumab but the patient did not want to.   Denied any new issues with urination including dysuria, hematuria, urgency, hesitancy, nocturia, incomplete voiding. Denied chest pain, nausea, vomiting, abd pain, nausea, vomiting, diarrhea,shortness of breath, pedal edema, Orthopnea, PND.  Home Blood pressures are not checked     ROS:  General: negative for chills, or fatigue  Respiratory: No cough, shortness of breath, or wheezing  Cardiovascular: No chest pain or dyspnea   Gastrointestinal: No abdominal pain, change in bowel habits    PAST MEDICAL HISTORY:  Past Medical History:   Diagnosis Date    History of stroke 05/09/2017    Hypertension 05/09/2017    Immunosuppression due to drug therapy 10/24/2022    SLE (systemic lupus erythematosus)     Vertigo        PAST SURGICAL  HISTORY:  Past Surgical History:   Procedure Laterality Date    RENAL BIOPSY         FAMILY HISTORY:   Family History   Problem Relation Name Age of Onset    Diabetes Mother      Cancer Sister          Breast    Stroke Neg Hx      Heart attack Neg Hx         SOCIAL HISTORY:  Social History     Socioeconomic History    Marital status: Single   Tobacco Use    Smoking status: Never    Smokeless tobacco: Never   Substance and Sexual Activity    Alcohol use: No    Drug use: No    Sexual activity: Not Currently     Social Determinants of Health     Financial Resource Strain: Medium Risk (8/16/2023)    Overall Financial Resource Strain (CARDIA)     Difficulty of Paying Living Expenses: Somewhat hard   Food Insecurity: No Food Insecurity (8/16/2023)    Hunger Vital Sign     Worried About Running Out of Food in the Last Year: Never true     Ran Out of Food in the Last Year: Never true   Transportation Needs: No Transportation Needs (8/16/2023)    PRAPARE - Transportation     Lack of Transportation (Medical): No     Lack of Transportation (Non-Medical): No   Stress: No Stress Concern Present (8/16/2023)    [employer] [place] [employer] - Occupational Stress Questionnaire     Feeling of Stress : Only a little   Housing Stability: Unknown (8/16/2023)    Housing Stability Vital Sign     Unable to Pay for Housing in the Last Year: No     Unstable Housing in the Last Year: No       ALLERGIES:  Review of patient's allergies indicates:   Allergen Reactions    Sulfamethoxazole-trimethoprim Rash       MEDICATIONS:    Current Outpatient Medications:     aspirin (ECOTRIN) 81 MG EC tablet, Take 1 tablet (81 mg total) by mouth once daily., Disp: 90 tablet, Rfl: 1    atorvastatin (LIPITOR) 40 MG tablet, Take 1 tablet (40 mg total) by mouth once daily., Disp: 90 tablet, Rfl: 1    cholecalciferol, vitamin D3, (VITAMIN D3) 25 mcg (1,000 unit) Chew, Take 1 tablet by mouth once daily., Disp: 90 tablet, Rfl: 1    hydroxychloroquine  "(PLAQUENIL) 200 mg tablet, Take 1 tablet (200 mg total) by mouth once daily., Disp: 90 tablet, Rfl: 1    mycophenolate (CELLCEPT) 500 mg Tab, Take 3 tablets (1,500 mg total) by mouth 2 (two) times daily., Disp: 540 tablet, Rfl: 1    predniSONE (DELTASONE) 2.5 MG tablet, Take 1 tablet (2.5 mg total) by mouth once daily., Disp: 90 tablet, Rfl: 1   Medication List with Changes/Refills   Changed and/or Refilled Medications    Modified Medication Previous Medication    ASPIRIN (ECOTRIN) 81 MG EC TABLET aspirin (ECOTRIN) 81 MG EC tablet       Take 1 tablet (81 mg total) by mouth once daily.    Take 1 tablet (81 mg total) by mouth once daily.    ATORVASTATIN (LIPITOR) 40 MG TABLET atorvastatin (LIPITOR) 40 MG tablet       Take 1 tablet (40 mg total) by mouth once daily.    Take 1 tablet (40 mg total) by mouth once daily.    CHOLECALCIFEROL, VITAMIN D3, (VITAMIN D3) 25 MCG (1,000 UNIT) CHEW cholecalciferol, vitamin D3, (VITAMIN D3) 25 mcg (1,000 unit) Chew       Take 1 tablet by mouth once daily.    Take 1 tablet by mouth once daily.    HYDROXYCHLOROQUINE (PLAQUENIL) 200 MG TABLET hydroxychloroquine (PLAQUENIL) 200 mg tablet       Take 1 tablet (200 mg total) by mouth once daily.    Take 1 tablet (200 mg total) by mouth once daily.    MYCOPHENOLATE (CELLCEPT) 500 MG TAB mycophenolate (CELLCEPT) 500 mg Tab       Take 3 tablets (1,500 mg total) by mouth 2 (two) times daily.    Take 3 tablets (1,500 mg total) by mouth 2 (two) times daily.    PREDNISONE (DELTASONE) 2.5 MG TABLET predniSONE (DELTASONE) 2.5 MG tablet       Take 1 tablet (2.5 mg total) by mouth once daily.    Take 1 tablet (2.5 mg total) by mouth once daily.   Discontinued Medications    PANTOPRAZOLE (PROTONIX) 20 MG TABLET    Take 1 tablet (20 mg total) by mouth once daily.        PHYSICAL EXAM:  /86   Ht 5' 4" (1.626 m)   Wt 56.4 kg (124 lb 5.4 oz)   BMI 21.34 kg/m²     General: No distress, No fever or chills  Lungs:Clear to auscultation bilaterally, " No Crackles  Heart: Regular rate and rhythm, no murmur, gallops or rubs  Abdomen: Soft, no tenderness, bowel sounds normal  Extremities: Atraumatic, no edema in LE  Skin: Turgor normal. Rash on face  Neurologic: No focal weakness, oriented.  Dialysis Access: Non applicable        LABS:  Lab Results   Component Value Date    HGB 10.8 (L) 05/24/2024        Lab Results   Component Value Date    CREATININE 1.0 05/24/2024       Prot/Creat Ratio, Urine   Date Value Ref Range Status   05/24/2024 0.71 (H) 0.00 - 0.20 Final   02/12/2024 0.43 (H) 0.00 - 0.20 Final   12/13/2023 0.89 (H) 0.00 - 0.20 Final       Lab Results   Component Value Date     05/24/2024    K 4.3 05/24/2024    CO2 26 05/24/2024       last PTH   Lab Results   Component Value Date    PTH 33.0 05/24/2024    CALCIUM 9.4 05/24/2024    PHOS 4.1 05/24/2024       Lab Results   Component Value Date    HGBA1C 5.5 04/02/2021       Lab Results   Component Value Date    LDLCALC 112.0 07/07/2023         Creatinine, Urine   Date Value Ref Range Status   05/24/2024 150.0 15.0 - 325.0 mg/dL Final   02/12/2024 192.0 15.0 - 325.0 mg/dL Final   12/13/2023 204.0 15.0 - 325.0 mg/dL Final       Protein, Urine Random   Date Value Ref Range Status   05/24/2024 106 (H) 0 - 15 mg/dL Final   02/12/2024 82 (H) 0 - 15 mg/dL Final   12/13/2023 181 (H) 0 - 15 mg/dL Final       Prot/Creat Ratio, Urine   Date Value Ref Range Status   05/24/2024 0.71 (H) 0.00 - 0.20 Final   02/12/2024 0.43 (H) 0.00 - 0.20 Final   12/13/2023 0.89 (H) 0.00 - 0.20 Final         ASSESSMENT:    1) Lupus Nephritis unknown class partial remission  2) Immunosuppression due to drug therapy  3) Systemic Lupus.  4) Hyperkalemia - Resolved  5) Vitamin D deficiency  6) Ischemic stroke - continue aspirin and statin.   Pt was reportedly seeing Dr. Adamson in 2018 and it is unclear if she is seeing any nephrologist since then. Last rheumatology clinic notes outside [facility] system stated that patient is on Cellcept 3  Gr/day, Plaquenil 200 mg BID and prednisone 2.5 mg daily. Due to proteinuria there was discussion about kidney biopsy too. There was also a question about patients medication compliance in the past due to patients forgetfulness. Her ultrasound 4/2021 showed 9.8 and 9.2 cm kidneys.   During her first clinic visit (around April 2021) her creatinine came back at 1.8 which is above her baseline of less than one and hyperkalemia. Discontinued her lisinopril and her BLAYNE and hyperkalemia improved making sublinical hypotension likely etiology for this. Urine microscopy and urine analysis did not show significant RBC, protein creatinine ratio did not show significant proteinuria.   Next clinic visit in July 2021 showed elevated creatinine and proteinuria. Started on prednisone which lead to improvement in kidney function and proteinuria. Requested patient to get a kidney biopsy, however she was hesitant. Re-discussed about Benlysta and kidney biopsy during subsequent visits patient did not want them. Also since July 2021 to April 2022 (10 months duration) she only filled 6 months worth of medication.   Patient declined Biopsy and Benlysta/Volcosporin.    Acid Base, electrolytes, Hemoglobin, Bone Mineral in acceptable range.    - Continue Cellcept 3 Gr/day, plaquenil 200 mg daily (continues to miss doses).  - Prednisone at 2.5 mg daily, Vitamin D improved a little, (Reinforced compliance and sent it again)  - Requested to see eye doctor again (reminded to see eye doctor)  - Pt and daughter aware that Lupus is not fully controlled, and it is unlikely to get controlled without absolute compliance with medications +/- adding Benlysta/Voclosporin.  - Continue to hold lisinopril for now.  - Avoid NSAIDs intake  - Re-counselled about being compliant with medications  - Reminded to keep uptodate with cancer screening and vaccines.    RTC in 3 months    Diagnosis and plan of care explained to the patient and daughter (on phone) at  length.  Verbalized understanding. Answered all questions.  Thanks for allowing me to participate in the care of this patient.     1:47 PM    Franko Faria MD  Nephrology & Critical Care    32 minutes of total time spent on the encounter, which includes face to face time and non-face to face time preparing to see the patient (eg, review of tests), Obtaining and/or reviewing separately obtained history, documenting clinical information in the electronic or other health record, independently interpreting results (not separately reported) and communicating results to the patient/family/caregiver, or Care coordination (not separately reported).

## 2024-06-15 ENCOUNTER — HOSPITAL ENCOUNTER (EMERGENCY)
Facility: OTHER | Age: 49
Discharge: HOME | End: 2024-06-15
Attending: EMERGENCY MEDICINE
Payer: MEDICARE

## 2024-06-15 VITALS
RESPIRATION RATE: 16 BRPM | WEIGHT: 125 LBS | HEIGHT: 63 IN | HEART RATE: 72 BPM | OXYGEN SATURATION: 98 % | SYSTOLIC BLOOD PRESSURE: 119 MMHG | DIASTOLIC BLOOD PRESSURE: 70 MMHG | TEMPERATURE: 98 F | BODY MASS INDEX: 22.15 KG/M2

## 2024-06-15 DIAGNOSIS — L03.012 PARONYCHIA OF LEFT MIDDLE FINGER: Primary | ICD-10-CM

## 2024-06-15 PROCEDURE — 99283 EMERGENCY DEPT VISIT LOW MDM: CPT

## 2024-06-15 RX ORDER — CEPHALEXIN 500 MG/1
500 CAPSULE ORAL 3 TIMES DAILY
Qty: 15 CAPSULE | Refills: 0 | Status: SHIPPED | OUTPATIENT
Start: 2024-06-15 | End: 2024-06-20

## 2024-06-15 NOTE — ED PROVIDER NOTES
Encounter Date: 6/15/2024       History     Chief Complaint   Patient presents with    Finger Pain     L middle finger swelling and pain since Tuesday. HX SLE. Denies injury/trauma.      8-year-old female presents with complaint of left middle finger pain and swelling over the last 4 days.  She states that she has been soaking the finger and did have some drainage from it.  Over the last day it is become increasingly painful.  She is not taking any medications for pain.  She reports a past history of a paronychia to the same finger, and states this feels the same.  She is right-hand dominant.    The history is provided by the patient.     Review of patient's allergies indicates:   Allergen Reactions    Sulfamethoxazole-trimethoprim Rash     Past Medical History:   Diagnosis Date    History of stroke 05/09/2017    Hypertension 05/09/2017    Immunosuppression due to drug therapy 10/24/2022    SLE (systemic lupus erythematosus)     Vertigo      Past Surgical History:   Procedure Laterality Date    RENAL BIOPSY       Family History   Problem Relation Name Age of Onset    Diabetes Mother      Cancer Sister          Breast    Stroke Neg Hx      Heart attack Neg Hx       Social History     Tobacco Use    Smoking status: Never    Smokeless tobacco: Never   Substance Use Topics    Alcohol use: No    Drug use: No     Review of Systems   Constitutional:  Negative for chills and fever.   HENT:  Negative for congestion and sore throat.    Eyes:  Negative for visual disturbance.   Respiratory:  Negative for cough and shortness of breath.    Cardiovascular:  Negative for chest pain and palpitations.   Gastrointestinal:  Negative for abdominal pain, diarrhea and vomiting.   Genitourinary:  Negative for decreased urine volume, dysuria and vaginal discharge.   Musculoskeletal:  Positive for arthralgias (Left middle finger). Negative for joint swelling, neck pain and neck stiffness.   Skin:  Positive for color change (Left middle  finger). Negative for rash and wound.   Neurological:  Negative for weakness, numbness and headaches.   Psychiatric/Behavioral:  Negative for confusion.        Physical Exam     Initial Vitals [06/15/24 0312]   BP Pulse Resp Temp SpO2   119/70 72 16 98.3 °F (36.8 °C) 98 %      MAP       --         Physical Exam    Nursing note and vitals reviewed.  Constitutional: She appears well-developed and well-nourished. No distress.   HENT:   Head: Normocephalic and atraumatic.   Mouth/Throat: Oropharynx is clear and moist. No oropharyngeal exudate.   Eyes: Conjunctivae and EOM are normal. Pupils are equal, round, and reactive to light.   Neck: Neck supple.   Cardiovascular:  Normal rate and normal heart sounds.           No murmur heard.  Pulmonary/Chest: Breath sounds normal. No respiratory distress. She has no wheezes. She has no rhonchi. She has no rales.   Abdominal: Abdomen is soft. There is no abdominal tenderness. There is no rebound and no guarding.   Musculoskeletal:         General: No tenderness or edema.      Cervical back: Neck supple.     Neurological: She is alert and oriented to person, place, and time. She has normal strength. GCS score is 15. GCS eye subscore is 4. GCS verbal subscore is 5. GCS motor subscore is 6.   Skin: Skin is warm and dry. Abscess (Paronychia to left middle finger with area of swelling and fluctuance to the lateral nail fold.  No spontaneous drainage.) noted. No rash noted.   Psychiatric: She has a normal mood and affect. Thought content normal.         ED Course   Procedures  Labs Reviewed - No data to display       Imaging Results    None          Medications - No data to display  Medical Decision Making  Urgent evaluation a 48-year-old female who presents with complaint of fingertip pain and swelling with drainage.  Vital signs are benign, afebrile.  Exam is consistent with a paronychia.  Patient refuses I and D, although this was the recommended treatment. Patient does have  significant immunosuppression given multiple medications she takes for her lupus including CellCept, Plaquenil, and daily prednisone.  She is also allergic to sulfa drugs.  Will prescribe cephalexin and advised q.i.d. soaks, strict return precautions.    Risk  Prescription drug management.                          Medical Decision Making:   Differential Diagnosis:   Includes but not limited to fracture, felon, paronychia, extensor tenosynovitis, ingrown fingernail             Clinical Impression:  Final diagnoses:  [L03.012] Paronychia of left middle finger (Primary)          ED Disposition Condition    Discharge Stable          ED Prescriptions       Medication Sig Dispense Start Date End Date Auth. Provider    cephALEXin (KEFLEX) 500 MG capsule Take 1 capsule (500 mg total) by mouth 3 (three) times daily. for 5 days 15 capsule 6/15/2024 6/20/2024 Kera Underwood MD          Follow-up Information       Follow up With Specialties Details Why Contact Info    Your regular primary care doctor  Schedule an appointment as soon as possible for a visit  For symptom recheck and close follow-up     [facility] Dept Emergency Medicine  As needed, If symptoms worsen [id number] [facility] [id number] [phone]             Kera Underwood MD  06/15/24 2065

## 2024-06-15 NOTE — DISCHARGE INSTRUCTIONS
Continue warm soapy soaks to the finger 3-4 times per day.    Return to the ER at any time for new or worsening symptoms, or if you change your mind regarding drainage.

## 2024-06-24 ENCOUNTER — PATIENT MESSAGE (OUTPATIENT)
Dept: RHEUMATOLOGY | Facility: CLINIC | Age: 49
End: 2024-06-24
Payer: MEDICARE

## 2024-07-31 NOTE — PROGRESS NOTES
Subjective:      Patient ID: Babak Arevalo is a 48 y.o. female w/ a hx of HTN, multiple CVA here for follow up and transfer of care for management of SLE w/ complications of lupus nephritis.    Chief Complaint: Disease Management    HPI    Last seen by Dr. Cabrera 11/2023.    Initial Hx:  SLE dx 2001 after she had a syncopal episode (was found to have a stroke), was hospitalized and ultimately found to have SLE w/ renal involvement and was biopsy+, MARSHA/dsDNA/SmRNP+. Treated with (?CYC) and prednisone. Has since been followed by [facility] until she established with Dr. Kramer --> Dr. Cabrera. She has been taking HCQ, MMF and chronic low dose prednisone since. Pt had a re-flare of her LN in 2021 and temporarily had to increase her prednisone dose. Other signs/symptoms include arthralgias, patchy hair loss, discoid lupus and leukopenias. Pt has been recommended benlysta previously but declined. Pt also has sequelae of discoid lupus present on face with scarring. There is also a question of APS - has had multiple strokes (2001 and 2021) and has B-2 glycoprotein IgA >40 x2 but no other +APLA labs.     Interval Hx:  - 6/2024 - had paronchia of the left middle finger, was seen in ED and prescribed keflex  - Feels she may be approaching menopause    Feels she has good days and bad days. On the bad days she gets joint and muscle pains. Still able to do everything she needs to do. Good days outweigh the bad days. Main pains are in feet and hands, feet > hands. Does admit to some swelling of feet.    Does get rashes on face but nowhere else. No sores. Getting chunks of hair loss. Feet have been swelling. Sometimes fingers but not as often as feet.     Had major stroke 4/2021 that had vision changes. Vision is 95% back.    Mycophenolate 1500 BID, plaquenil 200mg twice a day, prednisone 2.5 - has been on some form of prednisone since diagnosis.     Weight has been up and down. Weight is currently up.    Rheumatologic  "ROS:  Hair loss - Yes  Headaches - Denies  Vision changes - 95% back to normal since stroke  Skin:   New rashes - Has had some chronic rashes for the last 3-4 years, feels it's been getting better.     Relevant medical hx - multiple strokes (taking ASA, refusing statin)    Does not take pain medications    Review of Systems   Constitutional:  Negative for fatigue, fever and unexpected weight change.   HENT:  Negative for facial swelling.    Eyes:  Negative for visual disturbance.   Respiratory:  Negative for cough and shortness of breath.    Cardiovascular:  Negative for chest pain.   Gastrointestinal:  Negative for constipation and diarrhea.   Genitourinary:  Negative for dysuria.   Skin:  Negative for rash.   Neurological:  Negative for weakness and headaches.   Hematological:  Negative for adenopathy.   Psychiatric/Behavioral:  Negative for behavioral problems.         Objective:   /83   Pulse 89   Ht 5' 3" (1.6 m)   Wt 56.9 kg (125 lb 7.1 oz)   BMI 22.22 kg/m²   Physical Exam   Constitutional: She is oriented to person, place, and time. No distress.   HENT:   Head: Normocephalic and atraumatic.   Mouth/Throat: Mucous membranes are moist. Oropharynx is clear.   Head: Apparent thinning of hair diffusely, no patches at this time  Mouth/Throat: No oral ulcerations  Cardiovascular: Normal rate, regular rhythm and normal heart sounds.   Pulmonary/Chest: Effort normal and breath sounds normal. No respiratory distress.   Abdominal: Soft.   Musculoskeletal:         General: No tenderness. Normal range of motion.      Comments: Joint exam grossly normal w/o any tenderness   Neurological: She is alert and oriented to person, place, and time.   Skin: Skin is warm and dry. No rash noted.   Psychiatric: Her behavior is normal. Judgment and thought content normal.     Labs:  - 5/2024   Uric acid: 6   Vit D 24   Cr 1.0   PTH 33  - Trends   CBC    WBC - has been leukopenic   Complements have always been normal within " [facility] everywhere   dsDNA - always elevated, last 1:320 5/2024    Imaging:  - 4/6/2021: CT head/MRI brain - evolving infarct of left PCA    Hospitalizations:  - 2021: PCA stroke causing homonymous hemianopsia   - 2001: stroke causing right sided weakness, also found to have LN and started on CYC and prednisione    Procedures:  - N/A      Office Visit from 8/1/2024 in [facility]     8/1/2024    1545   Disease Activity Index     Seizure 0   Psychosis 0   Organic Brain Syndrome 0   Visual Disturbance 0   Cranial Nerve Disorder 0   Lupus Headache 0   CVA 0   Vasculitis 0   Arthritis 0   Myositis 0   Urinary Casts 0   Hematuria 0   Proteinuria 4   Pyuria 0   New Rash 2   Alopecia 2   Mucosal Ulcers 0   Pleurisy 0   Pericarditis 0   Low Complement 0   Increased DNA Binding 0   Fever 0   Thrombocytopenia 0   Leukopenia 1   SLEDAI Score 9         No data to display     Assessment/Plan:     1. Systemic lupus erythematosus, unspecified SLE type, unspecified organ involvement status    2. Rash due to systemic lupus erythematosus (SLE)    3. Immunosuppression due to drug therapy    4. Discoid lupus      SLE  Lupus Nephritis  Discoid Lupus  Dx in 2001 with joint pain and abnormal kidney function - per Dr. Cabrera note had a syncopal event and was found to be diagnosed with SLE. Had biopsy in 2001 consistent with LN. Has been followed by Dr. Faria with nephrology. Her prednisone dose was temporarily increased to 40 in 2021 d/t proteinuria (UPCR up to 1.28, now down to baseline ~0.4-0.7). Kidney function now stable on HCQ, MMF, low dose pred. Otherwise has some discoid lesions w/ scarring on her face, arthralgias in hands/feet and hair loss. May have APS per below but w/o clear diagnosis.   Diagnosis/Important Hx: A lot of records unavailable as she was diagnosed at [facility] Last SLEDAI 9 8/1/2024  Relevant serologies: MARSHA 1:2560 Speckled (2021), DsDNA+ up to  1:5120, last 1:320. SSA 2.56. Sm 2.03. Sm/RNP 5.99. Hx mild scattered leukopenias (usually ~3.8). C3/C4 always wnl.   Previous Therapy: CYC (2001 during initial LN flare)  Current Therapy: HCQ 200mg BID (should be daily per Dr. Faria, 10 tablets per week per last Dr. Cabrera note), MMF 1500mg BID, prednisone 2.5mg daily  Imaging/Procedures:   Other notes: Belimumab was recommended but pt declined, if things were to get worse, she may consider it  PLAN  - ESR/CRP/hepatic function panel with other labs  - Recommended vaccinations, patient still is not interested   Will continue to recomend  - Continue  BID, MMF 1500 BID & prednisone 2.5mg/day per Dr. Faria   Need to  on dose - should be taking ~10 tablets/week - pt may be taking 14   Messaged patient for further clarity  - Due for eye screening, discussed   Last 1/2024  - Provided pt with information on Benlysta, pt is more open to considering the medication  - Follow up in 3 months    Hx CVA  +B-2 Glycoprotein IgA  Pt has a hx of multiple strokes - per chart review hx of L PCA CVA in 2001 w/ right sided weakness and 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 Has hx of one miscarriage and 4 successful births. Has family hx of PE in a brother. IgA is less specific for APLA, so less likely related to this. Is taking daily ASA but no  Diagnosis/Important Hx:   Relevant serologies: B-2 Glycoprotein IgA >40 x2, all other APLA labs negative.  Previous Therapy:   Current Therapy: ASA daily  Imaging/Procedures: As above  Other notes:   PLAN  - Will add on further testing to her next labs (getting them with Dr. Faria)   Eventual referral to hematology after further workup  - Encourage restarting statin  - Lipid panel  - Continue daily aspirin      Rheumatology Health Maintenance  Vaccinations: Refuses vaccinations.   Medication monitoring:    HCQ - sees eye doctor every year, about due for another appointment (last visit 1/2023 - nl visual field,  however they did recommend decrease of plaquenil dosing, unclear if pt is doing 8 tablets/week as she had discussed 200mg BID)  Osteoporosis:    DEXA - DEXA ordered, never done   Treatment -   Cardiovascular risk:    BP - good w/o medication   Lipids - Not taking statin, will get repeat LDL   Glucose - A1c 10/2023 5.4    This patient encounter was staffed and the plan was formulated with rheumatology attending Dr. Grossman.    Wanda Kennedy MD  Rheumatology Fellow, PGY-4

## 2024-08-01 ENCOUNTER — OFFICE VISIT (OUTPATIENT)
Dept: RHEUMATOLOGY | Facility: CLINIC | Age: 49
End: 2024-08-01
Payer: MEDICARE

## 2024-08-01 ENCOUNTER — PATIENT MESSAGE (OUTPATIENT)
Dept: RHEUMATOLOGY | Facility: CLINIC | Age: 49
End: 2024-08-01

## 2024-08-01 VITALS
HEIGHT: 63 IN | HEART RATE: 89 BPM | BODY MASS INDEX: 22.23 KG/M2 | SYSTOLIC BLOOD PRESSURE: 119 MMHG | DIASTOLIC BLOOD PRESSURE: 83 MMHG | WEIGHT: 125.44 LBS

## 2024-08-01 DIAGNOSIS — M32.9 SYSTEMIC LUPUS ERYTHEMATOSUS, UNSPECIFIED SLE TYPE, UNSPECIFIED ORGAN INVOLVEMENT STATUS: Primary | ICD-10-CM

## 2024-08-01 DIAGNOSIS — Z79.899 IMMUNOSUPPRESSION DUE TO DRUG THERAPY: ICD-10-CM

## 2024-08-01 DIAGNOSIS — L93.0 DISCOID LUPUS: ICD-10-CM

## 2024-08-01 DIAGNOSIS — D84.821 IMMUNOSUPPRESSION DUE TO DRUG THERAPY: ICD-10-CM

## 2024-08-01 DIAGNOSIS — M32.9 RASH DUE TO SYSTEMIC LUPUS ERYTHEMATOSUS (SLE): ICD-10-CM

## 2024-08-01 PROCEDURE — 99999 PR PBB SHADOW E&M-EST. PATIENT-LVL III: CPT | Mod: PBBFAC,GC,, | Performed by: STUDENT IN AN ORGANIZED HEALTH CARE EDUCATION/TRAINING PROGRAM

## 2024-08-01 ASSESSMENT — SYSTEMIC LUPUS ERYTHEMATOSUS DISEASE ACTIVITY INDEX (SLEDAI): TOTAL_SCORE: 9

## 2024-08-01 NOTE — PATIENT INSTRUCTIONS
It was nice to meet you today!    We will follow up with you in a few months. Please get the lab work ordered by your kidney doctor. We are going to look into more workup to see whether you would benefit on being a blood thinner long term.     Wanda Kennedy MD  Rheumatology Fellow, PGY-4

## 2024-08-10 ENCOUNTER — HOSPITAL ENCOUNTER (EMERGENCY)
Facility: OTHER | Age: 49
Discharge: HOME | End: 2024-08-10
Attending: EMERGENCY MEDICINE
Payer: MEDICARE

## 2024-08-10 VITALS
SYSTOLIC BLOOD PRESSURE: 110 MMHG | WEIGHT: 124 LBS | OXYGEN SATURATION: 99 % | HEIGHT: 63 IN | DIASTOLIC BLOOD PRESSURE: 62 MMHG | BODY MASS INDEX: 21.97 KG/M2 | HEART RATE: 78 BPM | RESPIRATION RATE: 18 BRPM | TEMPERATURE: 98 F

## 2024-08-10 DIAGNOSIS — M25.561 RIGHT KNEE PAIN: ICD-10-CM

## 2024-08-10 DIAGNOSIS — S89.91XA INJURY OF RIGHT KNEE, INITIAL ENCOUNTER: Primary | ICD-10-CM

## 2024-08-10 PROCEDURE — 29505 APPLICATION LONG LEG SPLINT: CPT | Mod: RT

## 2024-08-10 PROCEDURE — 99283 EMERGENCY DEPT VISIT LOW MDM: CPT | Mod: 25

## 2024-08-10 NOTE — DISCHARGE INSTRUCTIONS
Your x-ray does not show any broken bones.  I a.m. placing you in a knee immobilizer for comfort and just in case you have some soft tissue injury.  It is important that you follow up with Orthopedic.  I have placed a referral.  The phone number is listed above.  Rest, ice, elevate, and compress.  You can rotate Tylenol and Motrin (as long as your kidneys are normal).  Return to the emergency department with any worsening symptoms or concerns.  Thank you for allowing me to take care of you today.

## 2024-08-10 NOTE — ED TRIAGE NOTES
Pt reporting trip & fall onto R knee x 4 days ago and now reporting pain. Denies hitting head. Pt ambulatory upon ED arrival. No deformity noted.

## 2024-08-10 NOTE — ED PROVIDER NOTES
Encounter Date: 8/10/2024       History     Chief Complaint   Patient presents with    Knee Pain     Pt fell x4 days ago onto her R knee, no LOC, no signs of head trauma.     Patient is a 49-year-old female with history of hypertension, SLE, and vertigo who presents to the emergency department after a mechanical fall.  Patient reports last week on her birthday, they were celebrating at a hotel.  She reports she missed a step causing her to fall directly on her right knee.  She reports pain with range of motion.  Reports some swelling.  Denies hitting her head or loss of consciousness.    The history is provided by the patient.     Review of patient's allergies indicates:   Allergen Reactions    Sulfamethoxazole-trimethoprim Rash     Past Medical History:   Diagnosis Date    History of stroke 05/09/2017    Hypertension 05/09/2017    Immunosuppression due to drug therapy 10/24/2022    SLE (systemic lupus erythematosus)     Vertigo      Past Surgical History:   Procedure Laterality Date    RENAL BIOPSY       Family History   Problem Relation Name Age of Onset    Diabetes Mother      Cancer Sister          Breast    Stroke Neg Hx      Heart attack Neg Hx       Social History     Tobacco Use    Smoking status: Never    Smokeless tobacco: Never   Substance Use Topics    Alcohol use: No    Drug use: No     Review of Systems   Constitutional:  Negative for activity change, appetite change, chills, fatigue and fever.   HENT:  Negative for congestion, ear discharge, ear pain, postnasal drip, rhinorrhea and sore throat.    Respiratory:  Negative for cough.    Cardiovascular:  Negative for chest pain.   Gastrointestinal:  Negative for abdominal pain, diarrhea and nausea.   Genitourinary:  Negative for dysuria.   Musculoskeletal:  Negative for back pain.        Right knee pain   Neurological:  Negative for dizziness, light-headedness and headaches.       Physical Exam     Initial Vitals [08/10/24 0908]   BP Pulse Resp Temp SpO2    99/64 82 16 98.5 °F (36.9 °C) (!) 94 %      MAP       --         Physical Exam    Nursing note and vitals reviewed.  Constitutional: Vital signs are normal. She appears well-developed and well-nourished. She is not diaphoretic.  Non-toxic appearance. No distress.   Butterfly rash noted to face   HENT:   Head: Normocephalic.   Right Ear: External ear normal.   Left Ear: External ear normal.   Nose: Nose normal.   Mouth/Throat: Oropharynx is clear and moist. No oropharyngeal exudate.   Eyes: Conjunctivae are normal. Pupils are equal, round, and reactive to light.   Neck:   Normal range of motion.  Cardiovascular:  Normal rate and regular rhythm.           Pulmonary/Chest: Breath sounds normal.   Abdominal: Abdomen is soft. Bowel sounds are normal. There is no abdominal tenderness.   Musculoskeletal:         General: Normal range of motion.      Cervical back: Normal range of motion.      Right knee: Swelling present. No crepitus. Normal range of motion. Tenderness present over the medial joint line and lateral joint line. Normal alignment. Normal pulse.     Neurological: She is alert and oriented to person, place, and time. She has normal strength.   Skin: Skin is warm and dry. Capillary refill takes less than 2 seconds.   Psychiatric: She has a normal mood and affect.         ED Course   Procedures  Labs Reviewed - No data to display       Imaging Results              X-Ray Knee 3 View Right (Final result)  Result time 08/10/24 10:10:32      Final result by Quirino Jean-Baptiste MD (08/10/24 10:10:32)                   Impression:      As above      Electronically signed by: Quirino Jean-Baptiste MD  Date:    08/10/2024  Time:    10:10               Narrative:    EXAMINATION:  XR KNEE 3 VIEW RIGHT    CLINICAL HISTORY:  Pain in right knee    TECHNIQUE:  AP, lateral, and Merchant views of the right knee were performed.    COMPARISON:  10/20/2008    FINDINGS:  No acute osseous or soft tissue abnormality.                                        Medications - No data to display  Medical Decision Making  Urgent evaluation of a 49-year-old female who presents to the emergency department with right knee pain.  Patient is afebrile and nontoxic appearing.  Mild swelling noted to the right knee joint.  Pain with range of motion.  No obvious laxity.  Bony tenderness over the patella and medial joint line.  X-ray shows no osseous injury.  Will place a knee immobilizer and have patient follow up with Ortho.  Advised to return to the emergency department with any worsening symptoms or concerns.                                      Clinical Impression:  Final diagnoses:  [M25.561] Right knee pain                 Gwen Granado PA-C  08/10/24 1028

## 2024-08-12 ENCOUNTER — TELEPHONE (OUTPATIENT)
Dept: ORTHOPEDICS | Facility: CLINIC | Age: 49
End: 2024-08-12
Payer: MEDICARE

## 2024-08-15 DIAGNOSIS — D68.59 HYPERCOAGULABLE STATE: ICD-10-CM

## 2024-08-15 DIAGNOSIS — Z86.73 HISTORY OF STROKE: ICD-10-CM

## 2024-08-15 DIAGNOSIS — M32.9 SYSTEMIC LUPUS ERYTHEMATOSUS, UNSPECIFIED SLE TYPE, UNSPECIFIED ORGAN INVOLVEMENT STATUS: Primary | ICD-10-CM

## 2024-09-13 ENCOUNTER — TELEPHONE (OUTPATIENT)
Dept: ORTHOPEDICS | Facility: CLINIC | Age: 49
End: 2024-09-13
Payer: MEDICARE

## 2024-09-16 ENCOUNTER — OFFICE VISIT (OUTPATIENT)
Dept: NEPHROLOGY | Facility: CLINIC | Age: 49
End: 2024-09-16
Payer: MEDICARE

## 2024-09-16 ENCOUNTER — LAB VISIT (OUTPATIENT)
Dept: LAB | Facility: HOSPITAL | Age: 49
End: 2024-09-16
Payer: MEDICARE

## 2024-09-16 VITALS
DIASTOLIC BLOOD PRESSURE: 60 MMHG | SYSTOLIC BLOOD PRESSURE: 97 MMHG | WEIGHT: 126.31 LBS | HEIGHT: 63 IN | BODY MASS INDEX: 22.38 KG/M2 | HEART RATE: 80 BPM

## 2024-09-16 DIAGNOSIS — D68.59 HYPERCOAGULABLE STATE: ICD-10-CM

## 2024-09-16 DIAGNOSIS — Z79.899 IMMUNOSUPPRESSION DUE TO DRUG THERAPY: Primary | ICD-10-CM

## 2024-09-16 DIAGNOSIS — M32.9 SYSTEMIC LUPUS ERYTHEMATOSUS, UNSPECIFIED SLE TYPE, UNSPECIFIED ORGAN INVOLVEMENT STATUS: ICD-10-CM

## 2024-09-16 DIAGNOSIS — Z86.73 HISTORY OF STROKE: ICD-10-CM

## 2024-09-16 DIAGNOSIS — M32.14 LUPUS NEPHRITIS: ICD-10-CM

## 2024-09-16 DIAGNOSIS — D50.9 IRON DEFICIENCY ANEMIA, UNSPECIFIED IRON DEFICIENCY ANEMIA TYPE: ICD-10-CM

## 2024-09-16 DIAGNOSIS — M32.9 RASH DUE TO SYSTEMIC LUPUS ERYTHEMATOSUS (SLE): ICD-10-CM

## 2024-09-16 DIAGNOSIS — D84.821 IMMUNOSUPPRESSION DUE TO DRUG THERAPY: ICD-10-CM

## 2024-09-16 DIAGNOSIS — L93.0 DISCOID LUPUS: ICD-10-CM

## 2024-09-16 DIAGNOSIS — I63.9 ISCHEMIC STROKE: ICD-10-CM

## 2024-09-16 DIAGNOSIS — Z79.899 IMMUNOSUPPRESSION DUE TO DRUG THERAPY: ICD-10-CM

## 2024-09-16 DIAGNOSIS — D84.821 IMMUNOSUPPRESSION DUE TO DRUG THERAPY: Primary | ICD-10-CM

## 2024-09-16 DIAGNOSIS — E55.9 VITAMIN D DEFICIENCY, UNSPECIFIED: ICD-10-CM

## 2024-09-16 LAB
25(OH)D3+25(OH)D2 SERPL-MCNC: 16 NG/ML (ref 30–96)
ALBUMIN SERPL BCP-MCNC: 3.3 G/DL (ref 3.5–5.2)
ALBUMIN SERPL BCP-MCNC: 3.3 G/DL (ref 3.5–5.2)
ALP SERPL-CCNC: 76 U/L (ref 55–135)
ALT SERPL W/O P-5'-P-CCNC: 12 U/L (ref 10–44)
ANION GAP SERPL CALC-SCNC: 9 MMOL/L (ref 8–16)
AST SERPL-CCNC: 20 U/L (ref 10–40)
BILIRUB DIRECT SERPL-MCNC: 0.1 MG/DL (ref 0.1–0.3)
BILIRUB SERPL-MCNC: 0.2 MG/DL (ref 0.1–1)
BUN SERPL-MCNC: 12 MG/DL (ref 6–20)
CALCIUM SERPL-MCNC: 8.8 MG/DL (ref 8.7–10.5)
CHLORIDE SERPL-SCNC: 107 MMOL/L (ref 95–110)
CHOLEST SERPL-MCNC: 168 MG/DL (ref 120–199)
CHOLEST/HDLC SERPL: 6 {RATIO} (ref 2–5)
CO2 SERPL-SCNC: 22 MMOL/L (ref 23–29)
CREAT SERPL-MCNC: 0.8 MG/DL (ref 0.5–1.4)
CRP SERPL-MCNC: 1.1 MG/L (ref 0–8.2)
ERYTHROCYTE [SEDIMENTATION RATE] IN BLOOD BY PHOTOMETRIC METHOD: 102 MM/HR (ref 0–36)
EST. GFR  (NO RACE VARIABLE): >60 ML/MIN/1.73 M^2
FERRITIN SERPL-MCNC: 70 NG/ML (ref 20–300)
GLUCOSE SERPL-MCNC: 78 MG/DL (ref 70–110)
HDLC SERPL-MCNC: 28 MG/DL (ref 40–75)
HDLC SERPL: 16.7 % (ref 20–50)
IRON SERPL-MCNC: 57 UG/DL (ref 30–160)
LDLC SERPL CALC-MCNC: 120.8 MG/DL (ref 63–159)
NONHDLC SERPL-MCNC: 140 MG/DL
PHOSPHATE SERPL-MCNC: 3.7 MG/DL (ref 2.7–4.5)
POTASSIUM SERPL-SCNC: 3.9 MMOL/L (ref 3.5–5.1)
PROT SERPL-MCNC: 8.5 G/DL (ref 6–8.4)
SATURATED IRON: 19 % (ref 20–50)
SODIUM SERPL-SCNC: 138 MMOL/L (ref 136–145)
TOTAL IRON BINDING CAPACITY: 293 UG/DL (ref 250–450)
TRANSFERRIN SERPL-MCNC: 198 MG/DL (ref 200–375)
TRIGL SERPL-MCNC: 96 MG/DL (ref 30–150)

## 2024-09-16 PROCEDURE — 80061 LIPID PANEL: CPT | Performed by: STUDENT IN AN ORGANIZED HEALTH CARE EDUCATION/TRAINING PROGRAM

## 2024-09-16 PROCEDURE — 82306 VITAMIN D 25 HYDROXY: CPT | Performed by: INTERNAL MEDICINE

## 2024-09-16 PROCEDURE — 1160F RVW MEDS BY RX/DR IN RCRD: CPT | Mod: CPTII,S$GLB,, | Performed by: INTERNAL MEDICINE

## 2024-09-16 PROCEDURE — 86140 C-REACTIVE PROTEIN: CPT | Performed by: STUDENT IN AN ORGANIZED HEALTH CARE EDUCATION/TRAINING PROGRAM

## 2024-09-16 PROCEDURE — 83540 ASSAY OF IRON: CPT | Performed by: INTERNAL MEDICINE

## 2024-09-16 PROCEDURE — 36415 COLL VENOUS BLD VENIPUNCTURE: CPT | Performed by: STUDENT IN AN ORGANIZED HEALTH CARE EDUCATION/TRAINING PROGRAM

## 2024-09-16 PROCEDURE — 80069 RENAL FUNCTION PANEL: CPT | Performed by: INTERNAL MEDICINE

## 2024-09-16 PROCEDURE — 99999 PR PBB SHADOW E&M-EST. PATIENT-LVL III: CPT | Mod: PBBFAC,,, | Performed by: INTERNAL MEDICINE

## 2024-09-16 PROCEDURE — 3074F SYST BP LT 130 MM HG: CPT | Mod: CPTII,S$GLB,, | Performed by: INTERNAL MEDICINE

## 2024-09-16 PROCEDURE — 85652 RBC SED RATE AUTOMATED: CPT | Performed by: STUDENT IN AN ORGANIZED HEALTH CARE EDUCATION/TRAINING PROGRAM

## 2024-09-16 PROCEDURE — 3078F DIAST BP <80 MM HG: CPT | Mod: CPTII,S$GLB,, | Performed by: INTERNAL MEDICINE

## 2024-09-16 PROCEDURE — 3066F NEPHROPATHY DOC TX: CPT | Mod: CPTII,S$GLB,, | Performed by: INTERNAL MEDICINE

## 2024-09-16 PROCEDURE — 86148 ANTI-PHOSPHOLIPID ANTIBODY: CPT | Mod: 91 | Performed by: STUDENT IN AN ORGANIZED HEALTH CARE EDUCATION/TRAINING PROGRAM

## 2024-09-16 PROCEDURE — 1159F MED LIST DOCD IN RCRD: CPT | Mod: CPTII,S$GLB,, | Performed by: INTERNAL MEDICINE

## 2024-09-16 PROCEDURE — 82728 ASSAY OF FERRITIN: CPT | Performed by: INTERNAL MEDICINE

## 2024-09-16 PROCEDURE — 3008F BODY MASS INDEX DOCD: CPT | Mod: CPTII,S$GLB,, | Performed by: INTERNAL MEDICINE

## 2024-09-16 PROCEDURE — 80076 HEPATIC FUNCTION PANEL: CPT | Performed by: STUDENT IN AN ORGANIZED HEALTH CARE EDUCATION/TRAINING PROGRAM

## 2024-09-16 PROCEDURE — 99214 OFFICE O/P EST MOD 30 MIN: CPT | Mod: S$GLB,,, | Performed by: INTERNAL MEDICINE

## 2024-09-16 RX ORDER — ASCORBIC ACID 125 MG
1 TABLET,CHEWABLE ORAL DAILY
Qty: 90 TABLET | Refills: 1 | Status: SHIPPED | OUTPATIENT
Start: 2024-09-16

## 2024-09-16 RX ORDER — ASPIRIN 81 MG/1
81 TABLET ORAL DAILY
Qty: 90 TABLET | Refills: 1 | Status: SHIPPED | OUTPATIENT
Start: 2024-09-16

## 2024-09-16 RX ORDER — PREDNISONE 2.5 MG/1
2.5 TABLET ORAL DAILY
Qty: 90 TABLET | Refills: 1 | Status: SHIPPED | OUTPATIENT
Start: 2024-09-16

## 2024-09-16 RX ORDER — ATORVASTATIN CALCIUM 40 MG/1
40 TABLET, FILM COATED ORAL DAILY
Qty: 90 TABLET | Refills: 1 | Status: SHIPPED | OUTPATIENT
Start: 2024-09-16

## 2024-09-16 RX ORDER — MYCOPHENOLATE MOFETIL 500 MG/1
1500 TABLET ORAL 2 TIMES DAILY
Qty: 540 TABLET | Refills: 1 | Status: SHIPPED | OUTPATIENT
Start: 2024-09-16

## 2024-09-16 RX ORDER — HYDROXYCHLOROQUINE SULFATE 200 MG/1
200 TABLET, FILM COATED ORAL DAILY
Qty: 90 TABLET | Refills: 1 | Status: SHIPPED | OUTPATIENT
Start: 2024-09-16

## 2024-09-17 LAB
MISCELLANEOUS TEST NAME: NORMAL

## 2024-09-18 NOTE — PROGRESS NOTES
REASON FOR CONSULT/CHIEF COMPLAIN: Acute renal failure, h/o Lupus nephritis    [facility]    HISTORY OF PRESENT ILLNESS: 49 y.o. female  patient was referred here for abnormal renal function. Denied chronic NSAID use, no known exposure to lithium, lead.   Early 2000's she was first time diagnosed with Lupus after a kidney biopsy at [facility], the results are not available now. She also had mild/small strokes at that time. She was give some infusions at that time like every two weeks (she thinks its cyclophosphamide). Since then she reportedly has been on Cellcept, Plaquenil and prednisone. She reports that her medications have not been changed. On reviewing the limited records it is unclear what dose of Cellcept she was taking, she has had two cellcept pill bottles at home, one said 500 MG one tablet TID, other said 500 mg three tablets BID. She had BLAYNE with proteinuria in July 2021, wanted to get a kidney biopsy but the patient was hesitant. Gave her high dose prednisone starting July 2021 which improved the BLAYNE and proteinuria, Currently on minimal dose. Seen Rheum who recommended adding Belimumab but the patient did not want to.   Denied any new issues with urination including dysuria, hematuria, urgency, hesitancy, nocturia, incomplete voiding. Denied chest pain, nausea, vomiting, abd pain, nausea, vomiting, diarrhea,shortness of breath, pedal edema, Orthopnea, PND.  Home Blood pressures are not checked     ROS:  General: negative for chills, or fatigue  Respiratory: No cough, shortness of breath, or wheezing  Cardiovascular: No chest pain or dyspnea   Gastrointestinal: No abdominal pain, change in bowel habits    PAST MEDICAL HISTORY:  Past Medical History:   Diagnosis Date    History of stroke 05/09/2017    Hypertension 05/09/2017    Immunosuppression due to drug therapy 10/24/2022    SLE (systemic lupus erythematosus)     Vertigo        PAST SURGICAL  HISTORY:  Past Surgical History:   Procedure Laterality Date    RENAL BIOPSY         FAMILY HISTORY:   Family History   Problem Relation Name Age of Onset    Diabetes Mother      Cancer Sister          Breast    Stroke Neg Hx      Heart attack Neg Hx         SOCIAL HISTORY:  Social History     Socioeconomic History    Marital status: Single   Tobacco Use    Smoking status: Never    Smokeless tobacco: Never   Substance and Sexual Activity    Alcohol use: No    Drug use: No    Sexual activity: Not Currently     Social Determinants of Health     Financial Resource Strain: Medium Risk (8/16/2023)    Overall Financial Resource Strain (CARDIA)     Difficulty of Paying Living Expenses: Somewhat hard   Food Insecurity: No Food Insecurity (8/16/2023)    Hunger Vital Sign     Worried About Running Out of Food in the Last Year: Never true     Ran Out of Food in the Last Year: Never true   Transportation Needs: No Transportation Needs (8/16/2023)    PRAPARE - Transportation     Lack of Transportation (Medical): No     Lack of Transportation (Non-Medical): No   Stress: No Stress Concern Present (8/16/2023)    [employer] [place] [employer] - Occupational Stress Questionnaire     Feeling of Stress : Only a little   Housing Stability: Unknown (8/16/2023)    Housing Stability Vital Sign     Unable to Pay for Housing in the Last Year: No     Unstable Housing in the Last Year: No       ALLERGIES:  Review of patient's allergies indicates:   Allergen Reactions    Sulfamethoxazole-trimethoprim Rash       MEDICATIONS:    Current Outpatient Medications:     aspirin (ECOTRIN) 81 MG EC tablet, Take 1 tablet (81 mg total) by mouth once daily., Disp: 90 tablet, Rfl: 1    atorvastatin (LIPITOR) 40 MG tablet, Take 1 tablet (40 mg total) by mouth once daily., Disp: 90 tablet, Rfl: 1    cholecalciferol, vitamin D3, (VITAMIN D3) 25 mcg (1,000 unit) Chew, Take 1 tablet by mouth once daily., Disp: 90 tablet, Rfl: 1    hydroxychloroquine  "(PLAQUENIL) 200 mg tablet, Take 1 tablet (200 mg total) by mouth once daily., Disp: 90 tablet, Rfl: 1    mycophenolate (CELLCEPT) 500 mg Tab, Take 3 tablets (1,500 mg total) by mouth 2 (two) times daily., Disp: 540 tablet, Rfl: 1    predniSONE (DELTASONE) 2.5 MG tablet, Take 1 tablet (2.5 mg total) by mouth once daily., Disp: 90 tablet, Rfl: 1   Medication List with Changes/Refills   Changed and/or Refilled Medications    Modified Medication Previous Medication    ASPIRIN (ECOTRIN) 81 MG EC TABLET aspirin (ECOTRIN) 81 MG EC tablet       Take 1 tablet (81 mg total) by mouth once daily.    Take 1 tablet (81 mg total) by mouth once daily.    ATORVASTATIN (LIPITOR) 40 MG TABLET atorvastatin (LIPITOR) 40 MG tablet       Take 1 tablet (40 mg total) by mouth once daily.    Take 1 tablet (40 mg total) by mouth once daily.    CHOLECALCIFEROL, VITAMIN D3, (VITAMIN D3) 25 MCG (1,000 UNIT) CHEW cholecalciferol, vitamin D3, (VITAMIN D3) 25 mcg (1,000 unit) Chew       Take 1 tablet by mouth once daily.    Take 1 tablet by mouth once daily.    HYDROXYCHLOROQUINE (PLAQUENIL) 200 MG TABLET hydroxychloroquine (PLAQUENIL) 200 mg tablet       Take 1 tablet (200 mg total) by mouth once daily.    Take 1 tablet (200 mg total) by mouth once daily.    MYCOPHENOLATE (CELLCEPT) 500 MG TAB mycophenolate (CELLCEPT) 500 mg Tab       Take 3 tablets (1,500 mg total) by mouth 2 (two) times daily.    Take 3 tablets (1,500 mg total) by mouth 2 (two) times daily.    PREDNISONE (DELTASONE) 2.5 MG TABLET predniSONE (DELTASONE) 2.5 MG tablet       Take 1 tablet (2.5 mg total) by mouth once daily.    Take 1 tablet (2.5 mg total) by mouth once daily.        PHYSICAL EXAM:  BP 97/60   Pulse 80   Ht 5' 3" (1.6 m)   Wt 57.3 kg (126 lb 5.2 oz)   BMI 22.38 kg/m²     General: No distress, No fever or chills  Lungs:Clear to auscultation bilaterally, No Crackles  Heart: Regular rate and rhythm, no murmur, gallops or rubs  Abdomen: Soft, no tenderness, bowel " sounds normal  Extremities: Atraumatic, no edema in LE  Skin: Turgor normal. Rash on face  Neurologic: No focal weakness, oriented.  Dialysis Access: Non applicable        LABS:  Lab Results   Component Value Date    HGB 9.5 (L) 09/16/2024    HGB 9.5 (L) 09/16/2024        Lab Results   Component Value Date    CREATININE 0.8 09/16/2024    CREATININE 0.8 09/16/2024    CREATININE 0.8 09/16/2024       Prot/Creat Ratio, Urine   Date Value Ref Range Status   05/24/2024 0.71 (H) 0.00 - 0.20 Final   02/12/2024 0.43 (H) 0.00 - 0.20 Final   12/13/2023 0.89 (H) 0.00 - 0.20 Final       Lab Results   Component Value Date     09/16/2024     09/16/2024     09/16/2024    K 3.9 09/16/2024    K 3.9 09/16/2024    K 3.9 09/16/2024    CO2 22 (L) 09/16/2024    CO2 22 (L) 09/16/2024    CO2 21 (L) 09/16/2024       last PTH   Lab Results   Component Value Date    PTH 41.8 09/16/2024    CALCIUM 8.8 09/16/2024    CALCIUM 9.1 09/16/2024    CALCIUM 9.1 09/16/2024    PHOS 3.7 09/16/2024    PHOS 3.7 09/16/2024    PHOS 3.7 09/16/2024       Lab Results   Component Value Date    HGBA1C 5.5 04/02/2021       Lab Results   Component Value Date    LDLCALC 120.8 09/16/2024         Creatinine, Urine   Date Value Ref Range Status   05/24/2024 150.0 15.0 - 325.0 mg/dL Final   02/12/2024 192.0 15.0 - 325.0 mg/dL Final   12/13/2023 204.0 15.0 - 325.0 mg/dL Final       Protein, Urine Random   Date Value Ref Range Status   05/24/2024 106 (H) 0 - 15 mg/dL Final   02/12/2024 82 (H) 0 - 15 mg/dL Final   12/13/2023 181 (H) 0 - 15 mg/dL Final       Prot/Creat Ratio, Urine   Date Value Ref Range Status   05/24/2024 0.71 (H) 0.00 - 0.20 Final   02/12/2024 0.43 (H) 0.00 - 0.20 Final   12/13/2023 0.89 (H) 0.00 - 0.20 Final         ASSESSMENT:    1) Lupus Nephritis unknown class partial remission  2) Immunosuppression due to drug therapy  3) Systemic Lupus.  4) Hyperkalemia - Resolved  5) Vitamin D deficiency  6) Ischemic stroke - continue aspirin and  statin.   Pt was reportedly seeing Dr. Adamson in 2018 and it is unclear if she is seeing any nephrologist since then. Last rheumatology clinic notes outside [facility] system stated that patient is on Cellcept 3 Gr/day, Plaquenil 200 mg BID and prednisone 2.5 mg daily. Due to proteinuria there was discussion about kidney biopsy too. There was also a question about patients medication compliance in the past due to patients forgetfulness. Her ultrasound 4/2021 showed 9.8 and 9.2 cm kidneys.   During her first clinic visit (around April 2021) her creatinine came back at 1.8 which is above her baseline of less than one and hyperkalemia. Discontinued her lisinopril and her BLAYNE and hyperkalemia improved making sublinical hypotension likely etiology for this. Urine microscopy and urine analysis did not show significant RBC, protein creatinine ratio did not show significant proteinuria.   Next clinic visit in July 2021 showed elevated creatinine and proteinuria. Started on prednisone which lead to improvement in kidney function and proteinuria. Requested patient to get a kidney biopsy, however she was hesitant. Re-discussed about Benlysta and kidney biopsy during subsequent visits patient did not want them. Also from July 2021 to April 2022 (10 months duration) she only filled 6 months worth of medication.   Patient declined Biopsy and Benlysta/Volcosporin.    Acid Base, electrolytes, Hemoglobin, Bone Mineral in acceptable range.    - Continue Cellcept 3 Gr/day, plaquenil 200 mg daily (continues to miss doses). Requested daughter on phone to check on her medication compliance.  - Prednisone at 2.5 mg daily, Vitamin D improved a little, (Reinforced compliance and sent it again)  - Requested to see eye doctor again (has not seen eye doctor in two years)  - Reinformed - Pt and daughter aware that Lupus is not fully controlled, and it is unlikely to get controlled without absolute compliance with medications +/- adding  Benlysta/Voclosporin.  - Had BLAYNE with ACE/ARB due to soft blood pressures.  - Avoid NSAIDs intake  - Re-counselled about being compliant with medications  - Reminded to keep uptodate with cancer screening and vaccines.    RTC in 3-4 months    Diagnosis and plan of care explained to the patient and daughter (on phone) at length.  Verbalized understanding. Answered all questions.  Thanks for allowing me to participate in the care of this patient.     1:47 PM    Franko Faria MD  Nephrology & Critical Care    32 minutes of total time spent on the encounter, which includes face to face time and non-face to face time preparing to see the patient (eg, review of tests), Obtaining and/or reviewing separately obtained history, documenting clinical information in the electronic or other health record, independently interpreting results (not separately reported) and communicating results to the patient/family/caregiver, or Care coordination (not separately reported).

## 2024-10-07 ENCOUNTER — PATIENT MESSAGE (OUTPATIENT)
Dept: OPHTHALMOLOGY | Facility: CLINIC | Age: 49
End: 2024-10-07
Payer: MEDICARE

## 2024-10-14 ENCOUNTER — PATIENT MESSAGE (OUTPATIENT)
Dept: GASTROENTEROLOGY | Facility: CLINIC | Age: 49
End: 2024-10-14
Payer: MEDICARE

## 2024-11-13 ENCOUNTER — TELEPHONE (OUTPATIENT)
Dept: NEPHROLOGY | Facility: CLINIC | Age: 49
End: 2024-11-13
Payer: MEDICARE

## 2024-11-13 NOTE — TELEPHONE ENCOUNTER
----- Message from Mary Perez sent at 11/12/2024  2:59 PM CST -----  Regarding: Advise  Contact: [phone]  Type: Advice    Who Called: Patient     Would the patient rather a call back or a response via [facility]? Call Back    Best Call Back Number: [phone]    Additional Information: Patient is calling stating that she was recently notified that provider will be leaving soon and she wants to know if provider will be seeing patient's at another facility in [place]. Pt states she wants to know this information because she would like to continue care at facility he may be transferring to.

## 2024-12-02 ENCOUNTER — TELEPHONE (OUTPATIENT)
Dept: NEPHROLOGY | Facility: CLINIC | Age: 49
End: 2024-12-02
Payer: MEDICARE

## 2024-12-02 NOTE — TELEPHONE ENCOUNTER
----- Message from Christina sent at 12/2/2024  8:51 AM CST -----  Regarding: Appt Advise  Contact: [phone]  Babak Arevalo calling regarding Appointment Access  (message) for # pt is calling to speak with nurse regarding an appt before provider leaves pls advise she states she got a letter saying provider is leaving Dec 31st

## 2024-12-04 ENCOUNTER — TELEPHONE (OUTPATIENT)
Dept: NEPHROLOGY | Facility: CLINIC | Age: 49
End: 2024-12-04
Payer: MEDICARE

## 2024-12-04 NOTE — TELEPHONE ENCOUNTER
----- Message from Anna sent at 12/4/2024  8:48 AM CST -----  Regarding: appt  Contact: [phone]  Pt is requesting a  call from someone in the office and is asking for a return call back soon. Thanks.     Reason for call:discuss plan of care for follow up appt      Patient's DX:     Patient requesting call back or [facility] msg: [phone]

## 2024-12-28 ENCOUNTER — HOSPITAL ENCOUNTER (EMERGENCY)
Facility: OTHER | Age: 49
Discharge: HOME | End: 2024-12-29
Attending: EMERGENCY MEDICINE
Payer: MEDICARE

## 2024-12-28 DIAGNOSIS — S61.211A LACERATION OF LEFT INDEX FINGER WITHOUT FOREIGN BODY WITHOUT DAMAGE TO NAIL, INITIAL ENCOUNTER: Primary | ICD-10-CM

## 2024-12-28 PROCEDURE — 99284 EMERGENCY DEPT VISIT MOD MDM: CPT | Mod: 25

## 2024-12-28 RX ORDER — HYDROCODONE BITARTRATE AND ACETAMINOPHEN 5; 325 MG/1; MG/1
1 TABLET ORAL
Status: COMPLETED | OUTPATIENT
Start: 2024-12-29 | End: 2024-12-29

## 2024-12-28 RX ORDER — CEPHALEXIN 500 MG/1
500 CAPSULE ORAL
Status: COMPLETED | OUTPATIENT
Start: 2024-12-28 | End: 2024-12-29

## 2024-12-29 VITALS
HEIGHT: 63 IN | BODY MASS INDEX: 21.79 KG/M2 | SYSTOLIC BLOOD PRESSURE: 174 MMHG | DIASTOLIC BLOOD PRESSURE: 94 MMHG | OXYGEN SATURATION: 100 % | RESPIRATION RATE: 20 BRPM | WEIGHT: 123 LBS | HEART RATE: 89 BPM | TEMPERATURE: 99 F

## 2024-12-29 PROCEDURE — 25000003 PHARM REV CODE 250: Performed by: EMERGENCY MEDICINE

## 2024-12-29 RX ORDER — HYDROCODONE BITARTRATE AND ACETAMINOPHEN 5; 325 MG/1; MG/1
1 TABLET ORAL EVERY 12 HOURS PRN
Qty: 6 TABLET | Refills: 0 | Status: SHIPPED | OUTPATIENT
Start: 2024-12-29 | End: 2025-01-01

## 2024-12-29 RX ORDER — CEPHALEXIN 500 MG/1
500 CAPSULE ORAL 4 TIMES DAILY
Qty: 20 CAPSULE | Refills: 0 | Status: SHIPPED | OUTPATIENT
Start: 2024-12-29 | End: 2025-01-05

## 2024-12-29 RX ADMIN — HYDROCODONE BITARTRATE AND ACETAMINOPHEN 1 TABLET: 5; 325 TABLET ORAL at 12:12

## 2024-12-29 RX ADMIN — CEPHALEXIN 500 MG: 500 CAPSULE ORAL at 12:12

## 2024-12-29 NOTE — ED NOTES
"Pt refusing to give urine sample for pregnancy test. States "I'm not pregnant" Provider notified  "

## 2024-12-29 NOTE — ED PROVIDER NOTES
Encounter Date: 12/28/2024       History     Chief Complaint   Patient presents with    Hand Pain     Pt states she cut her left index finger on ignacio jahaira, pt states now khris finger is hurting, mild swelling noted      49-year-old female with history of hypertension and lupus presents for evaluation of pain to her left index finger.  The patient states that she cut the top of her finger 5 days ago with a brand new clean kitchen knife that is very sharp.  The cut happened to the back of her left index finger near her nail and she currently complains of pain to the entire finger.  She denies any fever, chills or malaise.  Her tetanus is not up-to-date.  She states she has not had 1 since childhood but does not want it updated today.      Review of patient's allergies indicates:   Allergen Reactions    Sulfamethoxazole-trimethoprim Rash     Past Medical History:   Diagnosis Date    History of stroke 05/09/2017    Hypertension 05/09/2017    Immunosuppression due to drug therapy 10/24/2022    SLE (systemic lupus erythematosus)     Vertigo      Past Surgical History:   Procedure Laterality Date    RENAL BIOPSY       Family History   Problem Relation Name Age of Onset    Diabetes Mother      Cancer Sister          Breast    Stroke Neg Hx      Heart attack Neg Hx       Social History     Tobacco Use    Smoking status: Never    Smokeless tobacco: Never   Substance Use Topics    Alcohol use: No    Drug use: No     Review of Systems    Physical Exam     Initial Vitals [12/28/24 2305]   BP Pulse Resp Temp SpO2   (!) 185/93 79 17 98.4 °F (36.9 °C) 100 %      MAP       --         Physical Exam    Vitals reviewed.  Constitutional: She appears well-developed and well-nourished. She is not diaphoretic. No distress.   HENT:   Head: Normocephalic and atraumatic.   Right Ear: External ear normal.   Left Ear: External ear normal.   Nose: Nose normal.   Eyes: Conjunctivae and EOM are normal. Right eye exhibits no discharge. Left eye  exhibits no discharge.   Neck: Neck supple.   Normal range of motion.  Musculoskeletal:      Cervical back: Normal range of motion and neck supple.      Comments: Decreased range of motion of the left index finger secondary to pain.  No tenderness to palpation of the other digits or palm.     Neurological: She is alert and oriented to person, place, and time.   Skin:   The proximally 1 cm superficial laceration between the D IP joint and the cuticle.  There is minimal edema of the left index finger diffusely with no area of fluctuance, erythema, or warmth.  Nail bed intact.         ED Course   Procedures  Labs Reviewed - No data to display       Imaging Results              X-Ray Finger 2 or More Views Left (Final result)  Result time 12/28/24 23:57:17      Final result by Wendie Burr MD (12/28/24 23:57:17)                   Impression:      No acute osseous abnormality identified.      Electronically signed by: Wendie Burr MD  Date:    12/28/2024  Time:    23:57               Narrative:    EXAMINATION:  XR FINGER 2 OR MORE VIEWS LEFT    CLINICAL HISTORY:  finger pain;    TECHNIQUE:  Left index finger three views.    COMPARISON:  None    FINDINGS:  No evidence of acute displaced fracture, dislocation, or osseous destructive process.  No radiopaque retained foreign body seen.                                       Medications   cephALEXin capsule 500 mg (500 mg Oral Given 12/29/24 0000)   HYDROcodone-acetaminophen 5-325 mg per tablet 1 tablet (1 tablet Oral Given 12/29/24 0000)     Medical Decision Making  49-year-old female presents for evaluation of pain to her left index finger.  She does have a small and very superficial laceration appears to be healing well over the dorsal aspect of the left index finger proximal to the D IP joint.  It has no surrounding signs of cellulitis or an abscess.  She also has no evidence of a paronychia.  She reports no trauma to suspect a fracture or dislocation, however,  x-ray was obtained at the patient's request and unremarkable.  She also has no exam findings concerning for tenosynovitis.  Given the mild diffuse edema noted on exam and tenderness to palpation, I did discuss with her the plan to start her on a course of Keflex for possible early cellulitis.  The patient is also concerned for possible nerve damage and wanted further evaluation for this.  She has no exam findings to support this, however, she was provided with the information to the hand clinic to make a follow up appointment for further evaluation.  ED return precautions given.  Patient discharged home in stable condition.    Amount and/or Complexity of Data Reviewed  Radiology: ordered.    Risk  Prescription drug management.                                      Clinical Impression:  Final diagnoses:  [X49.645Y] Laceration of left index finger without foreign body without damage to nail, initial encounter (Primary)          ED Disposition Condition    Discharge Stable          ED Prescriptions       Medication Sig Dispense Start Date End Date Auth. Provider    cephALEXin (KEFLEX) 500 MG capsule Take 1 capsule (500 mg total) by mouth 4 (four) times daily. for 7 days 20 capsule 12/29/2024 1/5/2025 Rena Willingham MD    HYDROcodone-acetaminophen (NORCO) 5-325 mg per tablet Take 1 tablet by mouth every 12 (twelve) hours as needed for Pain (Severe pain). 6 tablet 12/29/2024 1/1/2025 Rena Willingham MD          Follow-up Information       Follow up With Specialties Details Why Contact Info Additional Information    Primary care  Schedule an appointment as soon as possible for a visit on 12/30/2024 For re-evaluation      [facility] - Emergency Dept Emergency Medicine Go to  If symptoms worsen [address]  [facility] [address] [phone]     [facility] Schedule an appointment as soon as possible for a visit on 12/30/2024  [address], [address]  [facility]  [address]  [phone] Turn at Entrance 1 on [facility] in [facility] and take elevators to Floor 2. Follow signs to [facility]. Take [facility] [address]vators to Floor 9 for [address].             Rena Willingham MD  12/29/24 0441

## 2024-12-30 NOTE — PROGRESS NOTES
Subjective:      Patient ID: Babak Arevalo is a 49 y.o. female w/ a hx of HTN, multiple CVA here for follow up and transfer of care for management of SLE w/ complications of lupus nephritis.    Chief Complaint: Disease Management    HPI      Initial Hx:  SLE dx 2001 after she had a syncopal episode (was found to have a stroke), was hospitalized and ultimately found to have SLE w/ renal involvement and was biopsy+, MARSHA/dsDNA/SmRNP+. Treated with (?CYC) and prednisone. Has since been followed by [facility] until she established with Dr. Kramer --> Dr. Cabrera. She has been taking HCQ, MMF and chronic low dose prednisone since. Pt had a re-flare of her LN in 2021 and temporarily had to increase her prednisone dose. Other signs/symptoms include arthralgias, patchy hair loss, discoid lupus and leukopenias. Pt has been recommended benlysta previously but declined. Pt also has sequelae of discoid lupus present on face with scarring. There is also a question of APS - has had multiple strokes (2001 and 2021) and has B-2 glycoprotein IgA >40 x2 but no other +APLA labs.     Interval Hx:  - 12/28 ED visit for lac of L index finger  - 9/16 saw nephrology Dr. Faria, no new changes  - 8/10 had injury of right knee    Feels she's been having fatigue and some light headedness. Joint pains are stable. Gets pain in fingers and toes. Left 2nd digit was swollen. Had cut the top of her finger 5 days prior to seeing urgent care for finger swelling. Nothing that looked infected draining from the wound no fevers. Has had dactylitis previously.     Has been losing a lot of hair moreso than before. Otherwise denied any new symptoms.    No blood clots. No swelling in her legs.     Medications - taking the ASA sometimes - plaquenil. MMF and 2.5mg daily of prednisone.     Pain meds - no pain meds.     Review of Systems   Constitutional:  Negative for fatigue, fever and unexpected weight change.   HENT:  Negative for facial swelling.    Eyes:   "Negative for visual disturbance.   Respiratory:  Negative for cough and shortness of breath.    Cardiovascular:  Negative for chest pain.   Gastrointestinal:  Negative for constipation and diarrhea.   Genitourinary:  Negative for dysuria.   Skin:  Negative for rash.   Neurological:  Negative for weakness and headaches.   Hematological:  Negative for adenopathy.   Psychiatric/Behavioral:  Negative for behavioral problems.         Objective:   BP (!) 156/96   Pulse 71   Ht 5' 3" (1.6 m)   Wt 57.6 kg (126 lb 15.8 oz)   BMI 22.49 kg/m²   Physical Exam   Constitutional: She is oriented to person, place, and time. No distress.   HENT:   Head: Normocephalic and atraumatic.   Mouth/Throat: Mucous membranes are moist. Oropharynx is clear.   Head: Apparent thinning of hair diffusely, no patches at this time  Mouth/Throat: No oral ulcerations  Cardiovascular: Normal rate, regular rhythm and normal heart sounds.   Pulmonary/Chest: Effort normal and breath sounds normal. No respiratory distress.   Abdominal: Soft.   Musculoskeletal:         General: No tenderness. Normal range of motion.      Comments: Does have dactylitis of the left 2nd digit, otherwise normal joint exam   Neurological: She is alert and oriented to person, place, and time.   Skin: Skin is warm and dry. No rash noted.   Psychiatric: Her behavior is normal. Judgment and thought content normal.     Labs:  - 5/2024   Uric acid: 6   Vit D 24   Cr 1.0   PTH 33  - Trends   CBC    WBC - has been leukopenic, nl ANC   Complements have always been normal within [facility] everywhere   dsDNA - always elevated, last 1:320 --> 1:80 (much improved from previous)    Imaging:  - 8/2024   XR knee - nl  - 4/6/2021: CT head/MRI brain - evolving infarct of left PCA    Hospitalizations:  - 2021: PCA stroke causing homonymous hemianopsia   - 2001: stroke causing right sided weakness, also found to have LN and started on CYC and prednisione    Procedures:  - " N/A      Office Visit from 8/1/2024 in [id number]     8/1/2024    1545   Disease Activity Index     Seizure 0   Psychosis 0   Organic Brain Syndrome 0   Visual Disturbance 0   Cranial Nerve Disorder 0   Lupus Headache 0   CVA 0   Vasculitis 0   Arthritis 0   Myositis 0   Urinary Casts 0   Hematuria 0   Proteinuria 4   Pyuria 0   New Rash 2   Alopecia 2   Mucosal Ulcers 0   Pleurisy 0   Pericarditis 0   Low Complement 0   Increased DNA Binding 0   Fever 0   Thrombocytopenia 0   Leukopenia 1   SLEDAI Score 9         No data to display     Assessment/Plan:     No diagnosis found.    SLE  Lupus Nephritis  Discoid Lupus  Dx in 2001 with joint pain and abnormal kidney function - per Dr. Cabrera note had a syncopal event and was found to be diagnosed with SLE. Had biopsy in 2001 consistent with LN. Has been followed by Dr. Faria with nephrology. Her prednisone dose was temporarily increased to 40 in 2021 d/t proteinuria (UPCR up to 1.28, now down to baseline ~0.4-0.7). Kidney function now stable on HCQ, MMF, low dose pred. Otherwise has some discoid lesions w/ scarring on her face, arthralgias in hands/feet and hair loss. May have APS per below but w/o clear diagnosis.   Diagnosis/Important Hx: A lot of records unavailable as she was diagnosed at [facility] Last SLEDAI 9 8/1/2024  Relevant serologies: MARSHA 1:2560 Speckled (2021), DsDNA+ up to 1:5120, last 1:320. SSA 2.56. Sm 2.03. Sm/RNP 5.99. Hx mild scattered leukopenias (usually ~3.8). C3/C4 always wnl.   Previous Therapy: CYC (2001 during initial LN flare)  Current Therapy: HCQ 200mg BID (should be daily per Dr. Faria, 10 tablets per week per last Dr. Cabrera note), MMF 1500mg BID, prednisone 2.5mg daily  Imaging/Procedures:   Other notes: Belimumab was recommended but pt declined, if things were to get worse, she may consider it  PLAN  - Labs today and in 3 months  - Recommended vaccinations, patient still is not  interested   Will continue to recomend  - Continue  BID, MMF 1500 BID & prednisone 2.5mg/day  - Due for eye screening, discussed   Last 1/2023  - Provided pt with information on Benlysta, pt is more open to considering the medication  - Follow up in 3 months    Hx CVA  +B-2 Glycoprotein IgA  Pt has a hx of multiple strokes - per chart review hx of L PCA CVA in 2001 w/ right sided weakness and 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 Has hx of one miscarriage and 4 successful births. Has family hx of PE in a brother. IgA is less specific for APLA, so less likely related to this. Is taking daily ASA but no  Diagnosis/Important Hx:   Relevant serologies: B-2 Glycoprotein IgA >40 x2, all other APLA labs negative.  Previous Therapy:   Current Therapy: ASA daily  Imaging/Procedures: As above  Other notes:   PLAN  - Encourage restarting statin  - Continue daily aspirin      Rheumatology Health Maintenance  Vaccinations: Refuses vaccinations.   Medication monitoring:    HCQ - sees eye doctor every year, about due for another appointment (last visit 1/2023 - nl visual field, however they did recommend decrease of plaquenil dosing, unclear if pt is doing 8 tablets/week as she had discussed 200mg BID)  Osteoporosis:    DEXA - DEXA ordered, never done   Treatment -   Cardiovascular risk:    BP - good w/o medication   Lipids - Not taking statin, will get repeat LDL   Glucose - A1c 10/2023 5.4    This patient encounter was staffed and the plan was formulated with rheumatology attending Dr. Grossman.    Wanda Kennedy MD  Rheumatology Fellow, PGY-4

## 2025-01-02 ENCOUNTER — LAB VISIT (OUTPATIENT)
Dept: LAB | Facility: HOSPITAL | Age: 50
End: 2025-01-02
Payer: MEDICARE

## 2025-01-02 ENCOUNTER — OFFICE VISIT (OUTPATIENT)
Dept: RHEUMATOLOGY | Facility: CLINIC | Age: 50
End: 2025-01-02
Payer: MEDICARE

## 2025-01-02 VITALS
BODY MASS INDEX: 22.5 KG/M2 | HEIGHT: 63 IN | DIASTOLIC BLOOD PRESSURE: 96 MMHG | SYSTOLIC BLOOD PRESSURE: 156 MMHG | WEIGHT: 127 LBS | HEART RATE: 71 BPM

## 2025-01-02 DIAGNOSIS — Z79.899 IMMUNOSUPPRESSION DUE TO DRUG THERAPY: ICD-10-CM

## 2025-01-02 DIAGNOSIS — D84.821 IMMUNOSUPPRESSION DUE TO DRUG THERAPY: ICD-10-CM

## 2025-01-02 DIAGNOSIS — I63.9 ISCHEMIC STROKE: ICD-10-CM

## 2025-01-02 DIAGNOSIS — M32.9 SYSTEMIC LUPUS ERYTHEMATOSUS, UNSPECIFIED SLE TYPE, UNSPECIFIED ORGAN INVOLVEMENT STATUS: ICD-10-CM

## 2025-01-02 DIAGNOSIS — S61.211D LACERATION OF LEFT INDEX FINGER WITHOUT FOREIGN BODY WITHOUT DAMAGE TO NAIL, SUBSEQUENT ENCOUNTER: ICD-10-CM

## 2025-01-02 DIAGNOSIS — M32.9 SYSTEMIC LUPUS ERYTHEMATOSUS, UNSPECIFIED SLE TYPE, UNSPECIFIED ORGAN INVOLVEMENT STATUS: Primary | ICD-10-CM

## 2025-01-02 LAB
ALBUMIN SERPL BCP-MCNC: 3.2 G/DL (ref 3.5–5.2)
ALP SERPL-CCNC: 78 U/L (ref 40–150)
ALT SERPL W/O P-5'-P-CCNC: 13 U/L (ref 10–44)
ANION GAP SERPL CALC-SCNC: 6 MMOL/L (ref 8–16)
AST SERPL-CCNC: 19 U/L (ref 10–40)
BASOPHILS # BLD AUTO: 0 K/UL (ref 0–0.2)
BASOPHILS NFR BLD: 0 % (ref 0–1.9)
BILIRUB SERPL-MCNC: 0.3 MG/DL (ref 0.1–1)
BUN SERPL-MCNC: 9 MG/DL (ref 6–20)
C3 SERPL-MCNC: 77 MG/DL (ref 50–180)
C4 SERPL-MCNC: 19 MG/DL (ref 11–44)
CALCIUM SERPL-MCNC: 9 MG/DL (ref 8.7–10.5)
CHLORIDE SERPL-SCNC: 105 MMOL/L (ref 95–110)
CO2 SERPL-SCNC: 25 MMOL/L (ref 23–29)
CREAT SERPL-MCNC: 0.8 MG/DL (ref 0.5–1.4)
CRP SERPL-MCNC: 1.3 MG/L (ref 0–8.2)
DIFFERENTIAL METHOD BLD: ABNORMAL
EOSINOPHIL # BLD AUTO: 0 K/UL (ref 0–0.5)
EOSINOPHIL NFR BLD: 0.3 % (ref 0–8)
ERYTHROCYTE [DISTWIDTH] IN BLOOD BY AUTOMATED COUNT: 15 % (ref 11.5–14.5)
ERYTHROCYTE [SEDIMENTATION RATE] IN BLOOD BY PHOTOMETRIC METHOD: 79 MM/HR (ref 0–36)
EST. GFR  (NO RACE VARIABLE): >60 ML/MIN/1.73 M^2
GLUCOSE SERPL-MCNC: 81 MG/DL (ref 70–110)
HCT VFR BLD AUTO: 33.7 % (ref 37–48.5)
HGB BLD-MCNC: 10.6 G/DL (ref 12–16)
IMM GRANULOCYTES # BLD AUTO: 0.01 K/UL (ref 0–0.04)
IMM GRANULOCYTES NFR BLD AUTO: 0.3 % (ref 0–0.5)
LYMPHOCYTES # BLD AUTO: 0.8 K/UL (ref 1–4.8)
LYMPHOCYTES NFR BLD: 28.3 % (ref 18–48)
MCH RBC QN AUTO: 26.1 PG (ref 27–31)
MCHC RBC AUTO-ENTMCNC: 31.5 G/DL (ref 32–36)
MCV RBC AUTO: 83 FL (ref 82–98)
MONOCYTES # BLD AUTO: 0.3 K/UL (ref 0.3–1)
MONOCYTES NFR BLD: 10.8 % (ref 4–15)
NEUTROPHILS # BLD AUTO: 1.7 K/UL (ref 1.8–7.7)
NEUTROPHILS NFR BLD: 60.3 % (ref 38–73)
NRBC BLD-RTO: 0 /100 WBC
PLATELET # BLD AUTO: 168 K/UL (ref 150–450)
PMV BLD AUTO: 9.8 FL (ref 9.2–12.9)
POTASSIUM SERPL-SCNC: 4.3 MMOL/L (ref 3.5–5.1)
PROT SERPL-MCNC: 8.8 G/DL (ref 6–8.4)
RBC # BLD AUTO: 4.06 M/UL (ref 4–5.4)
SODIUM SERPL-SCNC: 136 MMOL/L (ref 136–145)
WBC # BLD AUTO: 2.86 K/UL (ref 3.9–12.7)

## 2025-01-02 PROCEDURE — 83516 IMMUNOASSAY NONANTIBODY: CPT | Performed by: STUDENT IN AN ORGANIZED HEALTH CARE EDUCATION/TRAINING PROGRAM

## 2025-01-02 PROCEDURE — 86140 C-REACTIVE PROTEIN: CPT | Performed by: STUDENT IN AN ORGANIZED HEALTH CARE EDUCATION/TRAINING PROGRAM

## 2025-01-02 PROCEDURE — 36415 COLL VENOUS BLD VENIPUNCTURE: CPT | Performed by: STUDENT IN AN ORGANIZED HEALTH CARE EDUCATION/TRAINING PROGRAM

## 2025-01-02 PROCEDURE — 86235 NUCLEAR ANTIGEN ANTIBODY: CPT | Performed by: STUDENT IN AN ORGANIZED HEALTH CARE EDUCATION/TRAINING PROGRAM

## 2025-01-02 PROCEDURE — 80053 COMPREHEN METABOLIC PANEL: CPT | Performed by: STUDENT IN AN ORGANIZED HEALTH CARE EDUCATION/TRAINING PROGRAM

## 2025-01-02 PROCEDURE — 85652 RBC SED RATE AUTOMATED: CPT | Performed by: STUDENT IN AN ORGANIZED HEALTH CARE EDUCATION/TRAINING PROGRAM

## 2025-01-02 PROCEDURE — 83520 IMMUNOASSAY QUANT NOS NONAB: CPT | Performed by: STUDENT IN AN ORGANIZED HEALTH CARE EDUCATION/TRAINING PROGRAM

## 2025-01-02 PROCEDURE — 86160 COMPLEMENT ANTIGEN: CPT | Performed by: STUDENT IN AN ORGANIZED HEALTH CARE EDUCATION/TRAINING PROGRAM

## 2025-01-02 PROCEDURE — 86225 DNA ANTIBODY NATIVE: CPT | Mod: 59 | Performed by: STUDENT IN AN ORGANIZED HEALTH CARE EDUCATION/TRAINING PROGRAM

## 2025-01-02 PROCEDURE — 86225 DNA ANTIBODY NATIVE: CPT | Performed by: STUDENT IN AN ORGANIZED HEALTH CARE EDUCATION/TRAINING PROGRAM

## 2025-01-02 PROCEDURE — 99999 PR PBB SHADOW E&M-EST. PATIENT-LVL IV: CPT | Mod: PBBFAC,,, | Performed by: STUDENT IN AN ORGANIZED HEALTH CARE EDUCATION/TRAINING PROGRAM

## 2025-01-02 PROCEDURE — 86160 COMPLEMENT ANTIGEN: CPT | Mod: 59 | Performed by: STUDENT IN AN ORGANIZED HEALTH CARE EDUCATION/TRAINING PROGRAM

## 2025-01-02 PROCEDURE — 85025 COMPLETE CBC W/AUTO DIFF WBC: CPT | Performed by: STUDENT IN AN ORGANIZED HEALTH CARE EDUCATION/TRAINING PROGRAM

## 2025-01-02 PROCEDURE — 99214 OFFICE O/P EST MOD 30 MIN: CPT | Mod: PBBFAC | Performed by: STUDENT IN AN ORGANIZED HEALTH CARE EDUCATION/TRAINING PROGRAM

## 2025-01-02 PROCEDURE — 86235 NUCLEAR ANTIGEN ANTIBODY: CPT | Mod: 59 | Performed by: STUDENT IN AN ORGANIZED HEALTH CARE EDUCATION/TRAINING PROGRAM

## 2025-01-02 PROCEDURE — 83516 IMMUNOASSAY NONANTIBODY: CPT | Mod: 59 | Performed by: STUDENT IN AN ORGANIZED HEALTH CARE EDUCATION/TRAINING PROGRAM

## 2025-01-02 RX ORDER — MYCOPHENOLATE MOFETIL 500 MG/1
1500 TABLET ORAL 2 TIMES DAILY
Qty: 540 TABLET | Refills: 1 | Status: SHIPPED | OUTPATIENT
Start: 2025-01-02

## 2025-01-02 ASSESSMENT — SYSTEMIC LUPUS ERYTHEMATOSUS DISEASE ACTIVITY INDEX (SLEDAI): TOTAL_SCORE: 2

## 2025-01-03 LAB — ENA SCL70 IGG SER IA-ACNC: <0.2 U

## 2025-01-06 LAB
DNA TITER: NORMAL
DSDNA AB SER-ACNC: POSITIVE [IU]/ML
ENA SCL70 AB SER-ACNC: 0.7 U/ML

## 2025-01-07 ENCOUNTER — HOSPITAL ENCOUNTER (EMERGENCY)
Facility: HOSPITAL | Age: 50
Discharge: HOME | End: 2025-01-07
Attending: EMERGENCY MEDICINE
Payer: MEDICARE

## 2025-01-07 VITALS
DIASTOLIC BLOOD PRESSURE: 96 MMHG | RESPIRATION RATE: 20 BRPM | BODY MASS INDEX: 21.26 KG/M2 | OXYGEN SATURATION: 98 % | WEIGHT: 120 LBS | TEMPERATURE: 98 F | HEART RATE: 100 BPM | SYSTOLIC BLOOD PRESSURE: 163 MMHG

## 2025-01-07 DIAGNOSIS — M54.9 BACK PAIN, UNSPECIFIED BACK LOCATION, UNSPECIFIED BACK PAIN LATERALITY, UNSPECIFIED CHRONICITY: Primary | ICD-10-CM
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
## 2025-01-07 LAB — RNAP III AB SER-ACNC: 28 UNITS

## 2025-01-07 PROCEDURE — 25000003 PHARM REV CODE 250: Performed by: PHYSICIAN ASSISTANT

## 2025-01-07 PROCEDURE — 99284 EMERGENCY DEPT VISIT MOD MDM: CPT | Mod: 25

## 2025-01-07 RX ORDER — METHOCARBAMOL 1000 MG/1
1000 TABLET, FILM COATED ORAL 2 TIMES DAILY PRN
Qty: 10 TABLET | Refills: 0 | Status: SHIPPED | OUTPATIENT
Start: 2025-01-07 | End: 2025-01-12

## 2025-01-07 RX ORDER — LIDOCAINE 50 MG/G
1 PATCH TOPICAL
Status: DISCONTINUED | OUTPATIENT
Start: 2025-01-07 | End: 2025-01-07 | Stop reason: HOSPADM

## 2025-01-07 RX ADMIN — LIDOCAINE 1 PATCH: 50 PATCH CUTANEOUS at 11:01

## 2025-01-07 NOTE — DISCHARGE INSTRUCTIONS
Take the prescribed Robaxin as needed for pain. You may take this with over-the-counter Tylenol.    Follow-up with your primary care provider for further evaluation.    Return to the emergency room for new, worsening, or concerning symptoms.     Future Appointments   Date Time Provider Department Center   1/14/2025 10:30 AM Kelley Bran MD [facility]   4/2/2025 10:30 AM [facility]   4/2/2025 10:45 AM [facility]
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
## 2025-02-01 LAB
ANTI-PM/SCL AB: <20 UNITS
ANTI-SS-A 52 KD AB, IGG: <20 UNITS
EJ AB SER QL: NEGATIVE
ENA JO1 AB SER IA-ACNC: <20 UNITS
ENA SM+RNP AB SER IA-ACNC: 176 UNITS
FIBRILLARIN (U3 RNP): NEGATIVE
KU AB SER QL: NEGATIVE
MDA-5 (P140): <20 UNITS
MI2 AB SER QL: NEGATIVE
NXP-2 (P140): <20 UNITS
OJ AB SER QL: NEGATIVE
PL12 AB SER QL: NEGATIVE
PL7 AB SER QL: NEGATIVE
SRP AB SERPL QL: NEGATIVE
TH/TO: NEGATIVE
TIF1 GAMMA (P155/140): <20 UNITS
U2 SNRNP: NEGATIVE

## 2025-02-02 ENCOUNTER — HOSPITAL ENCOUNTER (EMERGENCY)
Facility: OTHER | Age: 50
Discharge: HOME | End: 2025-02-02
Attending: EMERGENCY MEDICINE
Payer: MEDICARE

## 2025-02-02 DIAGNOSIS — U07.1 COVID-19: Primary | ICD-10-CM

## 2025-02-02 DIAGNOSIS — U07.1 COVID-19 VIRUS DETECTED: ICD-10-CM

## 2025-02-02 LAB
CTP QC/QA: YES
GROUP A STREP, MOLECULAR: NEGATIVE
SARS-COV-2 RDRP RESP QL NAA+PROBE: POSITIVE

## 2025-02-02 PROCEDURE — 87651 STREP A DNA AMP PROBE: CPT | Performed by: NURSE PRACTITIONER

## 2025-02-02 PROCEDURE — 99284 EMERGENCY DEPT VISIT MOD MDM: CPT

## 2025-02-02 PROCEDURE — 87635 SARS-COV-2 COVID-19 AMP PRB: CPT | Performed by: NURSE PRACTITIONER

## 2025-02-02 RX ORDER — GUAIFENESIN 600 MG/1
600 TABLET, EXTENDED RELEASE ORAL 2 TIMES DAILY
Qty: 20 TABLET | Refills: 0 | Status: SHIPPED | OUTPATIENT
Start: 2025-02-02 | End: 2025-02-12

## 2025-02-02 RX ORDER — ALBUTEROL SULFATE 90 UG/1
2 INHALANT RESPIRATORY (INHALATION) EVERY 6 HOURS PRN
Qty: 18 G | Refills: 0 | Status: SHIPPED | OUTPATIENT
Start: 2025-02-02 | End: 2026-02-02

## 2025-02-02 RX ORDER — BENZONATATE 100 MG/1
100 CAPSULE ORAL 3 TIMES DAILY PRN
Qty: 20 CAPSULE | Refills: 0 | Status: SHIPPED | OUTPATIENT
Start: 2025-02-02 | End: 2025-02-12

## 2025-02-02 RX ORDER — ACETAMINOPHEN 500 MG
1000 TABLET ORAL
Status: DISCONTINUED | OUTPATIENT
Start: 2025-02-02 | End: 2025-02-02 | Stop reason: HOSPADM

## 2025-02-02 NOTE — ED PROVIDER NOTES
"Source of History:  Patient    Chief complaint:  General Illness (Productive cough and constant generalized headache x 2 days)      HPI:  Babak Arevalo is a 49 y.o. female presenting with cough/congestion with generalized headache that began 2 days ago, she also reports sore throat..  Not associated with the chest pain or shortness of breath.  She denies any nausea vomiting or diarrhea.  She has not tried any over-the-counter medications for relief.  Subjective fever/chills.    This is the extent to the patients complaints today here in the emergency department.    PMH:  As per HPI and below:  Past Medical History:   Diagnosis Date    History of stroke 05/09/2017    Hypertension 05/09/2017    Immunosuppression due to drug therapy 10/24/2022    SLE (systemic lupus erythematosus)     Vertigo      Past Surgical History:   Procedure Laterality Date    RENAL BIOPSY         Social History     Tobacco Use    Smoking status: Never    Smokeless tobacco: Never   Substance Use Topics    Alcohol use: No    Drug use: No     Review of patient's allergies indicates:   Allergen Reactions    Sulfamethoxazole-trimethoprim Rash       ROS: As per HPI and below:  General:  Subjective fever/chills, body aches  Eyes: No visual changes.  ENT:  Cough, congestion  Head:  headache.    Chest:  No shortness of breath.  Cardiovascular: No chest pain.  Abdomen: No abdominal pain.  No nausea or vomiting.   Genito-Urinary: No abnormal urination.  Neurologic: No focal weakness.  No numbness.  MSK: no back pain.  Integument: No rashes or lesions.  Hematologic: No easy bruising.  Endocrine: No excessive thirst or urination.    Physical Exam:    /85   Pulse 83   Temp 99.8 °F (37.7 °C) (Oral)   Resp 18   Ht 5' 3" (1.6 m)   Wt 57.9 kg (127 lb 10.3 oz)   SpO2 96% Comment: Ambulating  Breastfeeding No   BMI 22.61 kg/m²   Vitals:    02/02/25 1446 02/02/25 1639 02/03/25 0750   BP: 127/85     Pulse: 94 83    Resp: 18     Temp: 99.8 °F (37.7 °C) " "    TempSrc: Oral     SpO2:  96% 96%   Weight: 57.9 kg (127 lb 10.3 oz)     Height: 5' 3" (1.6 m)         Nursing note and vital signs reviewed.  Appearance: No acute distress.  Well-appearing, nontoxic  Eyes:  No conjunctival injection.  Extraocular muscles are intact.  ENT: Oropharynx erythema. No tonsillar exudate or swelling. Uvula midline and normal. No elevation of posterior oropharynx.  MM are pink and moist.   Bilateral TM's are pearly grey.  Lymph: No cervical lymphadenopathy.   Chest/ Respiratory:  Clear to auscultation bilaterally.  Good air movement.  No wheezes.  No rhonchi. No rales. No accessory muscle use.  Cardiovascular:  Regular rate and rhythm.  No murmurs. No gallops. No rubs.  Musculoskeletal: Neck supple.  No meningismus.  Skin: No rashes seen.  Good turgor.  No abrasions.  No ecchymoses.  Neuro: alert and oriented x3,  no focal neurological deficits.  Psych: Appropriate, conversant    Labs that have been ordered have been independently reviewed and interpreted by myself.  Abnormal Labs Reviewed   SARS-COV-2 RDRP GENE - Abnormal; Notable for the following components:       Result Value    POC Rapid COVID Positive (*)     All other components within normal limits       No orders to display         Initial Impression/ Differential Dx:  Differential Diagnosis includes, but is not limited to:  otitis media/external, bacterial sinusitis, allergic rhinitis, influenza, bacterial/viral pharyngitis, bacterial/viral pneumonia, covid-19, strep, URI, bronchitis      MDM:    Medical Decision Making  Patient is a 49 y.o. female who came into the ED with viral-like symptoms, the patient was swabbed for COVID and tested positive.  Negative for COVID and strep.  Symptomatic treatment in the interim.  Work note for one week was provided. Return precautions including worsening fever that is uncontrollable, shortness of breath or chest pain were discussed.  They ambulated around the emergency department and " maintain oxygen saturation without SOB or AMAYA. Vital signs did not indicate sepsis and patient was welling appear, okay for discharge home for quarantine.  Additional verbal discharge instructions were given and discussed with the patient, return precautions were given and the patient verbalized understanding.            Amount and/or Complexity of Data Reviewed  Labs:  Decision-making details documented in ED Course.    Risk  OTC drugs.  Prescription drug management.        ED Course as of 02/03/25 1628   Sun Feb 02, 2025   1522 SARS-CoV-2 RNA, Amplification, Qual(!): Positive [AS]   1714 Group A Strep, Molecular: Negative [AS]      ED Course User Index  [AS] Elif Fuentes FNP       Diagnostic Impression:    1. COVID-19         ED Disposition Condition    Discharge Stable            ED Prescriptions       Medication Sig Dispense Start Date End Date Auth. Provider    benzonatate (TESSALON) 100 MG capsule Take 1 capsule (100 mg total) by mouth 3 (three) times daily as needed for Cough. 20 capsule 2/2/2025 2/12/2025 Elif Fuentes FNP    guaiFENesin (MUCINEX) 600 mg 12 hr tablet Take 1 tablet (600 mg total) by mouth 2 (two) times daily. for 10 days 20 tablet 2/2/2025 2/12/2025 Elif Fuentes FNP    albuterol (VENTOLIN HFA) 90 mcg/actuation inhaler Inhale 2 puffs into the lungs every 6 (six) hours as needed for Wheezing. Rescue 18 g 2/2/2025 2/2/2026 Elif Fuentes FNP          Follow-up Information       Follow up With Specialties Details Why Contact [facility] -Primary Internal Medicine Schedule an appointment as soon as possible for a visit in 3 days  [address] [facility] [address]  [phone]      [facility] Emergency Dept Emergency Medicine Go to  If symptoms worsen [address] [address] [id number] [phone]                 Elif Fuentes FNP  02/03/25 1627

## 2025-02-02 NOTE — FIRST PROVIDER EVALUATION
Emergency Department TeleTriage Encounter Note      CHIEF COMPLAINT    Chief Complaint   Patient presents with    General Illness     Productive cough and constant generalized headache x 2 days       VITAL SIGNS   Initial Vitals [02/02/25 1446]   BP Pulse Resp Temp SpO2   127/85 94 18 99.8 °F (37.7 °C) --      MAP       --            ALLERGIES    Review of patient's allergies indicates:   Allergen Reactions    Sulfamethoxazole-trimethoprim Rash       PROVIDER TRIAGE NOTE  Verbal consent for the teletriage evaluation was given by the patient at the start of the evaluation.  All efforts will be made to maintain patient's privacy during the evaluation.      This is a teletriage evaluation of a 49 y.o. female presenting to the ED with c/o HA, cough for 1 week. Limited physical exam via telehealth: The patient is awake, alert, answering questions appropriately and is not in respiratory distress.  As the Teletriage provider, I performed an initial assessment and ordered appropriate labs and imaging studies, if any, to facilitate the patient's care once placed in the ED. Once a room is available, care and a full evaluation will be completed by an alternate ED provider.  Any additional orders and the final disposition will be determined by that provider.  All imaging and labs will not be followed-up by the Teletriage Team, including myself.          ORDERS  Labs Reviewed - No data to display    ED Orders (720h ago, onward)      Start Ordered     Status Ordering Provider    02/02/25 1459 02/02/25 1458  POCT urine pregnancy  Once         Ordered TERRY MANSFIELD    02/02/25 1459 02/02/25 1458  POCT COVID-19 Rapid Screening  Once         Ordered TERRY MANSFIELD    02/02/25 1459 02/02/25 1458  POCT Influenza A/B Molecular  Once         Ordered TERRY MANSFIELD              Virtual Visit Note: The provider triage portion of this emergency department evaluation and documentation was performed via [employer], a HIPAA-compliant  telemedicine application, in concert with a tele-presenter in the room. A face to face patient evaluation with one of my colleagues will occur once the patient is placed in an emergency department room.      DISCLAIMER: This note was prepared with [employer] voice recognition transcription software. Garbled syntax, mangled pronouns, and other bizarre constructions may be attributed to that software system.

## 2025-02-02 NOTE — Clinical Note
"Evelynmyrtle Arevalo (Kimona) was seen and treated in our emergency department on 2/2/2025.  She may return to work on 02/06/2025.       If you have any questions or concerns, please don't hesitate to call.      Elif Fuentes, FNP"

## 2025-02-03 VITALS
DIASTOLIC BLOOD PRESSURE: 85 MMHG | RESPIRATION RATE: 18 BRPM | SYSTOLIC BLOOD PRESSURE: 127 MMHG | HEART RATE: 83 BPM | TEMPERATURE: 100 F | BODY MASS INDEX: 22.61 KG/M2 | WEIGHT: 127.63 LBS | HEIGHT: 63 IN | OXYGEN SATURATION: 96 %

## 2025-02-20 ENCOUNTER — TELEPHONE (OUTPATIENT)
Dept: ORTHOPEDICS | Facility: CLINIC | Age: 50
End: 2025-02-20
Payer: MEDICARE

## 2025-03-12 ENCOUNTER — OFFICE VISIT (OUTPATIENT)
Dept: NEPHROLOGY | Facility: CLINIC | Age: 50
End: 2025-03-12
Payer: MEDICARE

## 2025-03-12 ENCOUNTER — LAB VISIT (OUTPATIENT)
Dept: LAB | Facility: HOSPITAL | Age: 50
End: 2025-03-12
Attending: INTERNAL MEDICINE
Payer: MEDICARE

## 2025-03-12 VITALS
HEART RATE: 53 BPM | WEIGHT: 121.25 LBS | DIASTOLIC BLOOD PRESSURE: 72 MMHG | BODY MASS INDEX: 21.48 KG/M2 | SYSTOLIC BLOOD PRESSURE: 109 MMHG

## 2025-03-12 DIAGNOSIS — D50.9 IRON DEFICIENCY ANEMIA, UNSPECIFIED IRON DEFICIENCY ANEMIA TYPE: ICD-10-CM

## 2025-03-12 DIAGNOSIS — M32.14 LUPUS NEPHRITIS: ICD-10-CM

## 2025-03-12 DIAGNOSIS — E55.9 VITAMIN D DEFICIENCY, UNSPECIFIED: ICD-10-CM

## 2025-03-12 DIAGNOSIS — M32.9 SYSTEMIC LUPUS ERYTHEMATOSUS, UNSPECIFIED SLE TYPE, UNSPECIFIED ORGAN INVOLVEMENT STATUS: Primary | ICD-10-CM

## 2025-03-12 DIAGNOSIS — D84.821 IMMUNOSUPPRESSION DUE TO DRUG THERAPY: ICD-10-CM

## 2025-03-12 DIAGNOSIS — Z79.899 IMMUNOSUPPRESSION DUE TO DRUG THERAPY: ICD-10-CM

## 2025-03-12 DIAGNOSIS — I63.9 ISCHEMIC STROKE: ICD-10-CM

## 2025-03-12 DIAGNOSIS — M32.9 SYSTEMIC LUPUS ERYTHEMATOSUS, UNSPECIFIED SLE TYPE, UNSPECIFIED ORGAN INVOLVEMENT STATUS: ICD-10-CM

## 2025-03-12 DIAGNOSIS — D50.8 OTHER IRON DEFICIENCY ANEMIA: ICD-10-CM

## 2025-03-12 LAB
ANION GAP SERPL CALC-SCNC: 6 MMOL/L (ref 8–16)
BUN SERPL-MCNC: 10 MG/DL (ref 6–20)
CALCIUM SERPL-MCNC: 8.7 MG/DL (ref 8.7–10.5)
CHLORIDE SERPL-SCNC: 106 MMOL/L (ref 95–110)
CO2 SERPL-SCNC: 27 MMOL/L (ref 23–29)
CREAT SERPL-MCNC: 0.7 MG/DL (ref 0.5–1.4)
EST. GFR  (NO RACE VARIABLE): >60 ML/MIN/1.73 M^2
GLUCOSE SERPL-MCNC: 81 MG/DL (ref 70–110)
POTASSIUM SERPL-SCNC: 4.5 MMOL/L (ref 3.5–5.1)
SODIUM SERPL-SCNC: 139 MMOL/L (ref 136–145)

## 2025-03-12 PROCEDURE — 3008F BODY MASS INDEX DOCD: CPT | Mod: CPTII,S$GLB,, | Performed by: INTERNAL MEDICINE

## 2025-03-12 PROCEDURE — 1159F MED LIST DOCD IN RCRD: CPT | Mod: CPTII,S$GLB,, | Performed by: INTERNAL MEDICINE

## 2025-03-12 PROCEDURE — 99215 OFFICE O/P EST HI 40 MIN: CPT | Mod: S$GLB,,, | Performed by: INTERNAL MEDICINE

## 2025-03-12 PROCEDURE — 3078F DIAST BP <80 MM HG: CPT | Mod: CPTII,S$GLB,, | Performed by: INTERNAL MEDICINE

## 2025-03-12 PROCEDURE — 80048 BASIC METABOLIC PNL TOTAL CA: CPT | Performed by: INTERNAL MEDICINE

## 2025-03-12 PROCEDURE — 3074F SYST BP LT 130 MM HG: CPT | Mod: CPTII,S$GLB,, | Performed by: INTERNAL MEDICINE

## 2025-03-12 PROCEDURE — 36415 COLL VENOUS BLD VENIPUNCTURE: CPT | Performed by: INTERNAL MEDICINE

## 2025-03-12 PROCEDURE — G2211 COMPLEX E/M VISIT ADD ON: HCPCS | Mod: S$GLB,,, | Performed by: INTERNAL MEDICINE

## 2025-03-12 PROCEDURE — 3066F NEPHROPATHY DOC TX: CPT | Mod: CPTII,S$GLB,, | Performed by: INTERNAL MEDICINE

## 2025-03-12 PROCEDURE — 99999 PR PBB SHADOW E&M-EST. PATIENT-LVL III: CPT | Mod: PBBFAC,,, | Performed by: INTERNAL MEDICINE

## 2025-03-12 NOTE — PROGRESS NOTES
Progress Note  Nephrology      [facility]    Reason for visit: CKD     SUBJECTIVE:   49 y.o. female  has a past medical history of History of stroke (05/09/2017), Hypertension (05/09/2017), Immunosuppression due to drug therapy (10/24/2022), SLE (systemic lupus erythematosus), and Vertigo. who has been following up in renal clinic for ckd management   The patient denies taking NSAIDs or new antibiotics, recreational drugs, recent episode of dehydration, diarrhea, nausea or vomiting, acute illness, hospitalization or exposure to IV radiocontrast.     ROS:  General: negative for chills, or fatigue  ENT: No epistaxis or headaches  Hematological and Lymphatic: No bleeding problems or blood clots.  Endocrine: No skin changes or temperature intolerance  Respiratory: No cough, shortness of breath, or wheezing  Cardiovascular: No chest pain or dyspnea   Gastrointestinal: No abdominal pain, change in bowel habits  Genito-Urinary: No dysuria, trouble voiding, or hematuria  Musculoskeletal: ROS: negative for - joint pain, joint stiffness, joint swelling, muscle pain or muscular weakness  Neurological: No focal weakness, no numbness  Dermatological: No rash or ulcers    OBJECTIVE:     There were no vitals filed for this visit.       Physical Exam:  General: no distress, well nourished  HENT: PERRLA, Normal mouth nose and ears.  Neck: no JVD and thyroid not enlarged, symmetric, no tenderness/mass/nodules  Lungs: clear to auscultation bilaterally and normal respiratory effort  Cardiovascular: regular rate and rhythm, S1, S2 normal, no murmur, click, rub or gallop.   Abdomen: soft, non-tender non-distented; bowel sounds normal  Skin: No rashes or lesions  Musculoskeletal:no edema in LE, no deformities.   Lymph Nodes: No cervical or supraclavicular adenopathy  Neurologic: Normal strength and tone. No focal numbness or weakness          Medications:  Current Medications[1]          Laboratory:  Lab Results   Component Value Date    CREATININE 0.8 01/02/2025       Prot/Creat Ratio, Urine   Date Value Ref Range Status   05/24/2024 0.71 (H) 0.00 - 0.20 Final   02/12/2024 0.43 (H) 0.00 - 0.20 Final   12/13/2023 0.89 (H) 0.00 - 0.20 Final       Lab Results   Component Value Date     01/02/2025    K 4.3 01/02/2025    CO2 25 01/02/2025       last PTH   Lab Results   Component Value Date    PTH 41.8 09/16/2024    CALCIUM 9.0 01/02/2025    PHOS 3.7 09/16/2024    PHOS 3.7 09/16/2024    PHOS 3.7 09/16/2024       Lab Results   Component Value Date    HGB 10.6 (L) 01/02/2025        Lab Results   Component Value Date    HGBA1C 5.4 10/16/2023       Lab Results   Component Value Date    LDLCALC 120.8 09/16/2024       Other Labs were reviewed      ASSESSMENT/PLAN:     1) Lupus Nephritis unknown class partial remission  - Pt was reportedly seeing Dr. Adamson in 2018 and it is unclear if she is seeing any nephrologist since then.  - Her ultrasound 4/2021 showed 9.8 and 9.2 cm kidneys.  - During her first clinic visit (around April 2021) her creatinine came back at 1.8 which is above her baseline of less than one and hyperkalemia. Discontinued her lisinopril and her BLAYNE and hyperkalemia improved making sublinical hypotension likely etiology for this. Urine microscopy and urine analysis did not show significant RBC, protein creatinine ratio did not show significant proteinuria. Next clinic visit in July 2021 showed elevated creatinine and proteinuria. Started on prednisone which lead to improvement in kidney function and proteinuria. Requested patient to get a kidney biopsy, however she was hesitant. Re-discussed about Benlysta and kidney biopsy during subsequent visits patient did not want them. Also from July 2021 to April 2022 (10 months duration) she only filled 6 months worth of medication.  -Patient declined Biopsy and Benlysta/Volcosporin.   - on 1/2025 scr 0.8 and GFR >60  - no recent UPCR ,  will order it     2) Immunosuppression due to drug therapy  - Continue Cellcept 1.5 Gr/day, plaquenil 200 mg daily  - Prednisone at 2.5 mg daily  - as per rheum : - Provided pt with information on Benlysta, pt is more open to considering the medication . She is refused it today     3) Systemic Lupus.    4) Hyperkalemia - Resolved    5) Vitamin D deficiency  - on vitamin D     6) Ischemic stroke - continue aspirin and statin.    7) AOCD: hbg at goal 10.6                Visit today included increased complexity associated with the care of the episodic problem lupus nephritis  addressed and managing the longitudinal care of the patient due to the serious and/or complex managed problem(s) CELINA, lupus nephritis , anemia           RTC in 8m      SANTOSH MTZ MD  NEPHROLOGY ATTENDING             [1]   Current Outpatient Medications:     albuterol (VENTOLIN HFA) 90 mcg/actuation inhaler, Inhale 2 puffs into the lungs every 6 (six) hours as needed for Wheezing. Rescue, Disp: 18 g, Rfl: 0    aspirin (ECOTRIN) 81 MG EC tablet, Take 1 tablet (81 mg total) by mouth once daily., Disp: 90 tablet, Rfl: 1    atorvastatin (LIPITOR) 40 MG tablet, Take 1 tablet (40 mg total) by mouth once daily., Disp: 90 tablet, Rfl: 1    cholecalciferol, vitamin D3, (VITAMIN D3) 25 mcg (1,000 unit) Chew, Take 1 tablet by mouth once daily., Disp: 90 tablet, Rfl: 1    hydroxychloroquine (PLAQUENIL) 200 mg tablet, Take 1 tablet (200 mg total) by mouth once daily., Disp: 90 tablet, Rfl: 1    mycophenolate (CELLCEPT) 500 mg Tab, Take 3 tablets (1,500 mg total) by mouth 2 (two) times daily., Disp: 540 tablet, Rfl: 1    predniSONE (DELTASONE) 2.5 MG tablet, Take 1 tablet (2.5 mg total) by mouth once daily., Disp: 90 tablet, Rfl: 1

## 2025-04-02 ENCOUNTER — LAB VISIT (OUTPATIENT)
Dept: LAB | Facility: OTHER | Age: 50
End: 2025-04-02
Attending: STUDENT IN AN ORGANIZED HEALTH CARE EDUCATION/TRAINING PROGRAM
Payer: MEDICARE

## 2025-04-02 DIAGNOSIS — Z79.899 IMMUNOSUPPRESSION DUE TO DRUG THERAPY: ICD-10-CM

## 2025-04-02 DIAGNOSIS — M32.9 SYSTEMIC LUPUS ERYTHEMATOSUS, UNSPECIFIED SLE TYPE, UNSPECIFIED ORGAN INVOLVEMENT STATUS: ICD-10-CM

## 2025-04-02 DIAGNOSIS — D84.821 IMMUNOSUPPRESSION DUE TO DRUG THERAPY: ICD-10-CM

## 2025-04-02 LAB
BACTERIA #/AREA URNS AUTO: NORMAL /HPF
BILIRUB UR QL STRIP.AUTO: NEGATIVE
CLARITY UR: CLEAR
COLOR UR AUTO: YELLOW
CREAT UR-MCNC: 117.5 MG/DL (ref 15–325)
GLUCOSE UR QL STRIP: NEGATIVE
HGB UR QL STRIP: NEGATIVE
HYALINE CASTS UR QL AUTO: 1 /LPF (ref 0–1)
KETONES UR QL STRIP: NEGATIVE
LEUKOCYTE ESTERASE UR QL STRIP: NEGATIVE
MICROSCOPIC COMMENT: NORMAL
NITRITE UR QL STRIP: NEGATIVE
PH UR STRIP: 6 [PH]
PROT UR QL STRIP: ABNORMAL
PROT UR-MCNC: 73 MG/DL
PROT/CREAT UR: 0.62 MG/G{CREAT}
RBC #/AREA URNS AUTO: 1 /HPF (ref 0–4)
SP GR UR STRIP: 1.02
SQUAMOUS #/AREA URNS AUTO: <1 /HPF
UROBILINOGEN UR STRIP-ACNC: NEGATIVE EU/DL
WBC #/AREA URNS AUTO: 1 /HPF (ref 0–5)
YEAST UR QL AUTO: NORMAL /HPF

## 2025-04-02 PROCEDURE — 84156 ASSAY OF PROTEIN URINE: CPT

## 2025-04-02 PROCEDURE — 81001 URINALYSIS AUTO W/SCOPE: CPT

## 2025-04-02 NOTE — PROGRESS NOTES
Subjective:      Patient ID: Babak Arevalo is a 49 y.o. female w/ a hx of HTN, multiple CVA here for follow up and transfer of care for management of SLE w/ complications of lupus nephritis.    Chief Complaint: Disease Management    HPI      Initial Hx:  SLE dx 2001 after she had a syncopal episode (was found to have a stroke), was hospitalized and ultimately found to have SLE w/ renal involvement and was biopsy+, MARSHA/dsDNA/SmRNP+. Treated with (?CYC) and prednisone. Has since been followed by [facility] until she established with Dr. Kramer --> Dr. Cabrera. She has been taking HCQ, MMF and chronic low dose prednisone since. Pt had a re-flare of her LN in 2021 and temporarily had to increase her prednisone dose. Other signs/symptoms include arthralgias, patchy hair loss, discoid lupus and leukopenias. Pt has been recommended benlysta previously but declined. Pt also has sequelae of discoid lupus present on face with scarring. There is also a question of APS - has had multiple strokes (2001 and 2021) and has B-2 glycoprotein IgA >40 x2 but no other +APLA labs.     Things have been going pretty well.    Weight is fluctuating but no severe weight loss. No fevers. No night sweats.     Does have a butterfly rash but it is not new. May be a little inflamed.     No recent infections.     Is not currently interested in increasing her medicine - feels comfortable with where she is at.     Review of Systems   Constitutional:  Negative for fatigue, fever and unexpected weight change.   HENT:  Negative for facial swelling.    Eyes:  Negative for visual disturbance.   Respiratory:  Negative for cough and shortness of breath.    Cardiovascular:  Negative for chest pain.   Gastrointestinal:  Negative for constipation and diarrhea.   Genitourinary:  Negative for dysuria.   Skin:  Negative for rash.   Neurological:  Negative for weakness and headaches.   Hematological:  Negative for adenopathy.   Psychiatric/Behavioral:  Negative for  "behavioral problems.         Objective:   /76   Pulse 91   Ht 5' 3" (1.6 m)   Wt 55.2 kg (121 lb 11.1 oz)   BMI 21.56 kg/m²   Physical Exam   Constitutional: She is oriented to person, place, and time. No distress.   HENT:   Head: Normocephalic and atraumatic.   Mouth/Throat: Mucous membranes are moist. Oropharynx is clear.   Head: Apparent thinning of hair diffusely, no patches at this time  Mouth/Throat: No oral ulcerations  Cardiovascular: Normal rate, regular rhythm and normal heart sounds.   Pulmonary/Chest: Effort normal and breath sounds normal. No respiratory distress.   Abdominal: Soft.   Musculoskeletal:         General: No tenderness. Normal range of motion.   Neurological: She is alert and oriented to person, place, and time.   Skin: Skin is warm and dry. Rash (Slight inflammation of the SLE rash on face as pictured below) noted.   Psychiatric: Her behavior is normal. Judgment and thought content normal.         Labs:  - 5/2024   Uric acid: 6   Vit D 24   Cr 1.0   PTH 33  - Trends   CBC    WBC - has been leukopenic, nl ANC   Complements have always been normal within [facility] everywhere   dsDNA - always elevated, last 1:320 --> 1:80 (much improved from previous)    Imaging:  - 8/2024   XR knee - nl  - 4/6/2021: CT head/MRI brain - evolving infarct of left PCA    Hospitalizations:  - 2021: PCA stroke causing homonymous hemianopsia   - 2001: stroke causing right sided weakness, also found to have LN and started on CYC and prednisione    Procedures:  - N/A      [facility]     8/1/2024    1545   Disease Activity Index     Seizure 0   Psychosis 0   Organic Brain Syndrome 0   Visual Disturbance 0   Cranial Nerve Disorder 0   Lupus Headache 0   CVA 0   Vasculitis 0   Arthritis 0   Myositis 0   Urinary Casts 0   Hematuria 0   Proteinuria 4   Pyuria 0   New Rash 2   Alopecia 2   Mucosal Ulcers 0   Pleurisy 0   Pericarditis 0   Low " Complement 0   Increased DNA Binding 0   Fever 0   Thrombocytopenia 0   Leukopenia 1   SLEDAI Score 9         No data to display     Assessment/Plan:     1. Systemic lupus erythematosus, unspecified SLE type, unspecified organ involvement status    2. Colon cancer screening        SLE  Lupus Nephritis  Discoid Lupus  Dx in 2001 with joint pain and abnormal kidney function - per Dr. Cabrera note had a syncopal event and was found to be diagnosed with SLE. Had biopsy in 2001 consistent with LN. Has been followed by Dr. Faria with nephrology. Her prednisone dose was temporarily increased to 40 in 2021 d/t proteinuria (UPCR up to 1.28, now down to baseline ~0.4-0.7). Kidney function now stable on HCQ, MMF, low dose pred. Otherwise has some discoid lesions w/ scarring on her face, arthralgias in hands/feet and hair loss. May have APS per below but w/o clear diagnosis.   4/3 - did have an abrupt increase in sed rate, mild lupus flare - rash and alopecia. Discussed benlysta again and provided with information - pt not interested at this time. Other labs stable/improved.  Diagnosis/Important Hx: A lot of records unavailable as she was diagnosed at [facility] (formerly [facility]). Last SLEDAI 9 8/1/2024  Relevant serologies: MARSHA 1:2560 Speckled (2021), DsDNA+ up to 1:5120, last 1:320. SSA 2.56. Sm 2.03. Sm/RNP 5.99. Hx mild scattered leukopenias (usually ~3.8). C3/C4 always wnl.   Previous Therapy: CYC (2001 during initial LN flare)  Current Therapy: HCQ 200mg BID (should be daily per gemma Cardona, 10 tablets per week per last Dr. Cabrera note), MMF 1500mg BID, prednisone 2.5mg daily, baby aspirin  Imaging/Procedures:   Other notes: Belimumab was recommended but pt declined, if things were to get worse, she may consider it  PLAN  - Labs today and in 3 months   SPEP/IF   dsDNA (not done on maintenance labs)  - Recommended vaccinations, patient still is not interested   Will continue to recomend  - Continue   BID, MMF 1500 BID & prednisone 2.5mg/day  - Due for eye screening   Last 1/2023  - Provided pt with information on Benlysta, pt is more open to considering the medication  - Follow up in 3 months    Hx CVA  +B-2 Glycoprotein IgA  Pt has a hx of multiple strokes - per chart review hx of L PCA CVA in 2001 w/ right sided weakness and 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 Has hx of one miscarriage and 4 successful births. Has family hx of PE in a brother. IgA is less specific for APLA, so less likely related to this. Is taking daily ASA but no  Diagnosis/Important Hx:   Relevant serologies: B-2 Glycoprotein IgA >40 x2, all other APLA labs negative.  Previous Therapy:   Current Therapy: ASA daily  Imaging/Procedures: As above  Other notes:   PLAN  - Continue statin  - Continue daily aspirin      Rheumatology Health Maintenance  Vaccinations: Refuses vaccinations.   Medication monitoring:    HCQ - sees eye doctor every year, about due for another appointment (last visit 1/2023 - nl visual field, however they did recommend decrease of plaquenil dosing, unclear if pt is doing 8 tablets/week as she had discussed 200mg BID)  Osteoporosis:    DEXA - DEXA ordered, never done   Treatment -   Cardiovascular risk:    BP - good w/o medication   Lipids - Not taking statin, will get repeat LDL   Glucose - A1c 10/2023 5.4    This patient encounter was staffed and the plan was formulated with rheumatology attending Dr. Grossman.    Wanda Kennedy MD  Rheumatology Fellow, PGY-4

## 2025-04-03 ENCOUNTER — LAB VISIT (OUTPATIENT)
Dept: LAB | Facility: HOSPITAL | Age: 50
End: 2025-04-03
Payer: MEDICARE

## 2025-04-03 ENCOUNTER — OFFICE VISIT (OUTPATIENT)
Dept: RHEUMATOLOGY | Facility: CLINIC | Age: 50
End: 2025-04-03
Payer: MEDICARE

## 2025-04-03 VITALS
HEIGHT: 63 IN | DIASTOLIC BLOOD PRESSURE: 76 MMHG | SYSTOLIC BLOOD PRESSURE: 111 MMHG | BODY MASS INDEX: 21.56 KG/M2 | HEART RATE: 91 BPM | WEIGHT: 121.69 LBS

## 2025-04-03 DIAGNOSIS — M32.9 SYSTEMIC LUPUS ERYTHEMATOSUS, UNSPECIFIED SLE TYPE, UNSPECIFIED ORGAN INVOLVEMENT STATUS: ICD-10-CM

## 2025-04-03 DIAGNOSIS — Z12.11 COLON CANCER SCREENING: ICD-10-CM

## 2025-04-03 DIAGNOSIS — M32.9 SYSTEMIC LUPUS ERYTHEMATOSUS, UNSPECIFIED SLE TYPE, UNSPECIFIED ORGAN INVOLVEMENT STATUS: Primary | ICD-10-CM

## 2025-04-03 PROCEDURE — 99999 PR PBB SHADOW E&M-EST. PATIENT-LVL III: CPT | Mod: PBBFAC,GC,, | Performed by: STUDENT IN AN ORGANIZED HEALTH CARE EDUCATION/TRAINING PROGRAM

## 2025-04-03 PROCEDURE — 36415 COLL VENOUS BLD VENIPUNCTURE: CPT

## 2025-04-03 PROCEDURE — 83520 IMMUNOASSAY QUANT NOS NONAB: CPT

## 2025-04-03 PROCEDURE — 86334 IMMUNOFIX E-PHORESIS SERUM: CPT

## 2025-04-03 PROCEDURE — 86225 DNA ANTIBODY NATIVE: CPT

## 2025-04-03 PROCEDURE — 84165 PROTEIN E-PHORESIS SERUM: CPT | Mod: 26,,, | Performed by: PATHOLOGY

## 2025-04-03 PROCEDURE — 84165 PROTEIN E-PHORESIS SERUM: CPT

## 2025-04-03 ASSESSMENT — SYSTEMIC LUPUS ERYTHEMATOSUS DISEASE ACTIVITY INDEX (SLEDAI): TOTAL_SCORE: 4

## 2025-04-03 NOTE — PATIENT INSTRUCTIONS
It was nice to see you today!    We will continue the course. We'll get some lab work today and in a month. We'll consider doing a medicine that's injectable called Benlysta. It slightly increases your risk of infection and a reaction at the injection site but can help your lupus rash and hair loss.     I recommend that you get all of your cancer screenings - according to our chart you should consider a colonoscopy.      Wanda Kennedy MD  Rheumatology Fellow, PGY-4

## 2025-04-04 ENCOUNTER — TELEPHONE (OUTPATIENT)
Dept: OPTOMETRY | Facility: CLINIC | Age: 50
End: 2025-04-04
Payer: MEDICARE

## 2025-04-04 LAB
ALBUMIN, SPE (OHS): 3.59 G/DL (ref 3.35–5.55)
ALPHA 1 GLOB (OHS): 0.32 GM/DL (ref 0.17–0.41)
ALPHA 2 GLOB (OHS): 0.73 GM/DL (ref 0.43–0.99)
BETA GLOB (OHS): 1.32 GM/DL (ref 0.5–1.1)
GAMMA GLOBULIN (OHS): 2.33 GM/DL (ref 0.67–1.58)
PATHOLOGIST INTERPRETATION - IFE SERUM (OHS): NORMAL
PATHOLOGIST REVIEW - SPE (OHS): NORMAL
PROT SERPL-MCNC: 8.3 GM/DL (ref 6–8.4)

## 2025-04-05 ENCOUNTER — RESULTS FOLLOW-UP (OUTPATIENT)
Dept: RHEUMATOLOGY | Facility: CLINIC | Age: 50
End: 2025-04-05

## 2025-04-07 LAB
DSDNA ANTIBODY (OHS): POSITIVE
DSDNA ANTIBODY TITER (OHS): ABNORMAL

## 2025-04-24 ENCOUNTER — PATIENT MESSAGE (OUTPATIENT)
Dept: RHEUMATOLOGY | Facility: CLINIC | Age: 50
End: 2025-04-24
Payer: MEDICARE

## 2025-04-24 DIAGNOSIS — M34.89 OTHER SYSTEMIC SCLEROSIS: Primary | ICD-10-CM

## 2025-04-24 NOTE — TELEPHONE ENCOUNTER
Pt with persistent RNA poly III ab+, does not have features of systemic sclerosis. Advised her to get a CT chest and echo as a baseline, as well as ensuring that she's up to date on all age appropriate cancer screenings.    This patient encounter was staffed and the plan was formulated with rheumatology attending Dr. Grossman.    Wanda Kennedy MD  Rheumatology Fellow, PGY-4

## 2025-04-30 ENCOUNTER — HOSPITAL ENCOUNTER (OUTPATIENT)
Dept: RADIOLOGY | Facility: OTHER | Age: 50
Discharge: HOME | End: 2025-04-30
Attending: STUDENT IN AN ORGANIZED HEALTH CARE EDUCATION/TRAINING PROGRAM
Payer: MEDICARE

## 2025-04-30 ENCOUNTER — HOSPITAL ENCOUNTER (OUTPATIENT)
Dept: CARDIOLOGY | Facility: OTHER | Age: 50
Discharge: HOME | End: 2025-04-30
Attending: STUDENT IN AN ORGANIZED HEALTH CARE EDUCATION/TRAINING PROGRAM
Payer: MEDICARE

## 2025-04-30 VITALS
HEIGHT: 63 IN | BODY MASS INDEX: 21.44 KG/M2 | WEIGHT: 121 LBS | HEART RATE: 91 BPM | DIASTOLIC BLOOD PRESSURE: 76 MMHG | SYSTOLIC BLOOD PRESSURE: 111 MMHG

## 2025-04-30 DIAGNOSIS — M34.89 OTHER SYSTEMIC SCLEROSIS: ICD-10-CM

## 2025-04-30 LAB
AV INDEX (PROSTH): 0.54
AV MEAN GRADIENT: 3 MMHG
AV PEAK GRADIENT: 6 MMHG
AV VALVE AREA BY VELOCITY RATIO: 1.7 CM²
AV VALVE AREA: 1.9 CM²
AV VELOCITY RATIO: 0.5
BSA FOR ECHO PROCEDURE: 1.56 M2
CV ECHO LV RWT: 0.45 CM
DOP CALC AO PEAK VEL: 1.2 M/S
DOP CALC AO VTI: 23.8 CM
DOP CALC LVOT AREA: 3.5 CM2
DOP CALC LVOT DIAMETER: 2.1 CM
DOP CALC LVOT PEAK VEL: 0.6 M/S
DOP CALC LVOT STROKE VOLUME: 44.7 CM3
DOP CALCLVOT PEAK VEL VTI: 12.9 CM
E WAVE DECELERATION TIME: 177 MSEC
E/A RATIO: 1.2
E/E' RATIO: 7 M/S
ECHO LV POSTERIOR WALL: 0.9 CM (ref 0.6–1.1)
FRACTIONAL SHORTENING: 37.5 % (ref 28–44)
GLOBAL LONGITUIDAL STRAIN: 17.8 %
INTERVENTRICULAR SEPTUM: 0.9 CM (ref 0.6–1.1)
IVC DIAMETER: 1.36 CM
IVRT: 76 MSEC
LA MAJOR: 3.3 CM
LA MINOR: 4.2 CM
LA WIDTH: 3.9 CM
LEFT ATRIUM SIZE: 2.9 CM
LEFT ATRIUM VOLUME INDEX: 23 ML/M2
LEFT ATRIUM VOLUME: 36 CM3
LEFT INTERNAL DIMENSION IN SYSTOLE: 2.5 CM (ref 2.1–4)
LEFT VENTRICLE DIASTOLIC VOLUME INDEX: 44.87 ML/M2
LEFT VENTRICLE DIASTOLIC VOLUME: 70 ML
LEFT VENTRICLE MASS INDEX: 70.3 G/M2
LEFT VENTRICLE SYSTOLIC VOLUME INDEX: 13.5 ML/M2
LEFT VENTRICLE SYSTOLIC VOLUME: 21 ML
LEFT VENTRICULAR INTERNAL DIMENSION IN DIASTOLE: 4 CM (ref 3.5–6)
LEFT VENTRICULAR MASS: 109.7 G
LV LATERAL E/E' RATIO: 7.2 M/S
LV SEPTAL E/E' RATIO: 7.2 M/S
LVED V (TEICH): 69.91 ML
LVES V (TEICH): 21.34 ML
LVOT MG: 0.96 MMHG
LVOT MV: 0.48 CM/S
MV PEAK A VEL: 0.54 M/S
MV PEAK E VEL: 0.65 M/S
MV STENOSIS PRESSURE HALF TIME: 48.77 MS
MV VALVE AREA P 1/2 METHOD: 4.51 CM2
PISA MRMAX VEL: 3.84 M/S
PISA TR MAX VEL: 2.1 M/S
PULM VEIN S/D RATIO: 1.28
PV PEAK D VEL: 0.32 M/S
PV PEAK GRADIENT: 2 MMHG
PV PEAK S VEL: 0.41 M/S
PV PEAK VELOCITY: 0.74 M/S
RA MAJOR: 3.67 CM
RA PRESSURE ESTIMATED: 3 MMHG
RA WIDTH: 3.1 CM
RV TB RVSP: 5 MMHG
TDI LATERAL: 0.09 M/S
TDI SEPTAL: 0.09 M/S
TDI: 0.09 M/S
TR MAX PG: 18 MMHG
TV REST PULMONARY ARTERY PRESSURE: 21 MMHG
Z-SCORE OF LEFT VENTRICULAR DIMENSION IN END DIASTOLE: -1.18
Z-SCORE OF LEFT VENTRICULAR DIMENSION IN END SYSTOLE: -0.87

## 2025-04-30 PROCEDURE — 71250 CT THORAX DX C-: CPT | Mod: TC

## 2025-04-30 PROCEDURE — 93356 MYOCRD STRAIN IMG SPCKL TRCK: CPT | Mod: ,,, | Performed by: INTERNAL MEDICINE

## 2025-04-30 PROCEDURE — 93306 TTE W/DOPPLER COMPLETE: CPT | Mod: 26,,, | Performed by: INTERNAL MEDICINE

## 2025-04-30 PROCEDURE — 71250 CT THORAX DX C-: CPT | Mod: 26,,, | Performed by: RADIOLOGY

## 2025-04-30 PROCEDURE — 93356 MYOCRD STRAIN IMG SPCKL TRCK: CPT

## 2025-05-05 ENCOUNTER — RESULTS FOLLOW-UP (OUTPATIENT)
Dept: RHEUMATOLOGY | Facility: CLINIC | Age: 50
End: 2025-05-05

## 2025-05-07 ENCOUNTER — TELEPHONE (OUTPATIENT)
Dept: RHEUMATOLOGY | Facility: CLINIC | Age: 50
End: 2025-05-07
Payer: MEDICARE

## 2025-05-07 NOTE — TELEPHONE ENCOUNTER
Pt wished to go over her test results - all were normal. She has been having trouble accessing her My Chart to review results.    Wanda Kennedy MD  Rheumatology Fellow, PGY-4

## 2025-05-07 NOTE — TELEPHONE ENCOUNTER
----- Message from Med Assistant Hernadez sent at 5/7/2025 10:46 AM CDT -----  Pt would like a call from you. Wouldn't give me any details outside of it being personal.

## 2025-05-07 NOTE — TELEPHONE ENCOUNTER
Gave pt a call back. No answer left message to call us back or send a message.     ----- Message from [employer] sent at 5/7/2025  8:35 AM CDT -----    Patient Returning CallWho Called:ptDoes the patient know what this is regarding?:pt is asking for a call back asap please Would the patient rather a call back or a response via [facility]? callBest Call Back Number:[phone] Information: call back

## 2025-06-03 NOTE — PATIENT INSTRUCTIONS
Diagnosed in 2020 and completed radiation therapy.  No cancer therapy since that time.  Patient follows with oncology.  Scheduled for MRI and bone scan     It was nice to see you again!    We will get labs today and in three months, no changes to your treatment plan.    We will have you establish with Dr. Pete the dermatologist.    We also placed a referral to hand therapy in case your finger doesn't improve.    Please get your eyes checked and your bone density done.      Wanda Kennedy MD  Rheumatology Fellow, PGY-4

## 2025-07-09 NOTE — PROGRESS NOTES
Subjective:      Patient ID: Babak Arevalo is a 49 y.o. female w/ a hx of HTN, multiple CVA here for follow up and transfer of care for management of SLE w/ complications of lupus nephritis.    Chief Complaint: Disease Management    HPI      Initial Hx:  SLE dx 2001 after she had a syncopal episode (was found to have a stroke), was hospitalized and ultimately found to have SLE w/ renal involvement and was biopsy+, MARSHA/dsDNA/SmRNP+. Treated with (?CYC) and prednisone. Has since been followed by [facility] until she established with Dr. Kramer --> Dr. Cabrera. She has been taking HCQ, MMF and chronic low dose prednisone since. Pt had a re-flare of her LN in 2021 and temporarily had to increase her prednisone dose. Other signs/symptoms include arthralgias, patchy hair loss, discoid lupus and leukopenias. Pt has been recommended benlysta previously but declined. Pt also has sequelae of discoid lupus present on face with scarring. There is also a question of APS - has had multiple strokes (2001 and 2021) and has B-2 glycoprotein IgA >40 x2 but no other +APLA labs.     Still has rash. Hair hasn't been falling out, but still not growing back to baseline.     Rash is about the same if a little worse. No sores in mouth, has never had mouth sores.     No other clots/stroke episodes since 4/2021.     No trouble breathing. Occasional chest pain. When it happens it's brief. No cough.     Still getting rash in ears. Has not been putting anything on it. Has been using natural cream remedy that she made. Baking soda/Vaseline. Has not been using steroid cream.    Would like to see dermatology.     Some joint pains, no swelling.     Review of Systems   Constitutional:  Negative for fatigue, fever and unexpected weight change.   HENT:  Negative for facial swelling.    Eyes:  Negative for visual disturbance.   Respiratory:  Negative for cough and shortness of breath.    Cardiovascular:  Negative for chest pain.   Gastrointestinal:   "Negative for constipation and diarrhea.   Genitourinary:  Negative for dysuria.   Skin:  Negative for rash.   Neurological:  Negative for weakness and headaches.   Hematological:  Negative for adenopathy.   Psychiatric/Behavioral:  Negative for behavioral problems.         Objective:   /82   Pulse 84   Ht 5' 3" (1.6 m)   Wt 54.2 kg (119 lb 7.8 oz)   BMI 21.17 kg/m²   Physical Exam   Constitutional: She is oriented to person, place, and time. No distress.   HENT:   Head: Normocephalic and atraumatic.   Mouth/Throat: Mucous membranes are moist. Oropharynx is clear.   Head: Apparent thinning of hair diffusely, no patches at this time  Mouth/Throat: No oral ulcerations  Cardiovascular: Normal rate, regular rhythm and normal heart sounds.   Pulmonary/Chest: Effort normal and breath sounds normal. No respiratory distress.   Abdominal: Soft.   Musculoskeletal:         General: No tenderness. Normal range of motion.   Neurological: She is alert and oriented to person, place, and time.   Skin: Skin is warm and dry. Rash (Slight inflammation of the SLE rash on face as pictured below) noted.   Psychiatric: Her behavior is normal. Judgment and thought content normal.                   Labs:  - 5/2024   Uric acid: 6   Vit D 24   Cr 1.0   PTH 33  - Trends   CBC    WBC - has been leukopenic, nl ANC   Complements have always been normal within [facility] everywhere   dsDNA - always elevated, last 1:320 --> 1:80 (much improved from previous)    Imaging:  - 8/2024   XR knee - nl  - 4/6/2021: CT head/MRI brain - evolving infarct of left PCA    Hospitalizations:  - 2021: PCA stroke causing homonymous hemianopsia   - 2001: stroke causing right sided weakness, also found to have LN and started on CYC and prednisione    Procedures:  - N/A      [id number]     8/1/2024    1545   Disease Activity Index     Seizure 0   Psychosis 0   Organic Brain Syndrome 0   Visual " Disturbance 0   Cranial Nerve Disorder 0   Lupus Headache 0   CVA 0   Vasculitis 0   Arthritis 0   Myositis 0   Urinary Casts 0   Hematuria 0   Proteinuria 4   Pyuria 0   New Rash 2   Alopecia 2   Mucosal Ulcers 0   Pleurisy 0   Pericarditis 0   Low Complement 0   Increased DNA Binding 0   Fever 0   Thrombocytopenia 0   Leukopenia 1   SLEDAI Score 9         No data to display     Assessment/Plan:     No diagnosis found.      SLE  Lupus Nephritis  Discoid Lupus  Dx in 2001 with joint pain and abnormal kidney function - per Dr. Cabrera note had a syncopal event and was found to be diagnosed with SLE. Had biopsy in 2001 consistent with LN. Has been followed by Dr. Faria with nephrology. Her prednisone dose was temporarily increased to 40 in 2021 d/t proteinuria (UPCR up to 1.28, now down to baseline ~0.4-0.7). Kidney function now stable on HCQ, MMF, low dose pred. Otherwise has some discoid lesions w/ scarring on her face, arthralgias in hands/feet and hair loss. May have APS per below but w/o clear diagnosis.   4/3 - did have an abrupt increase in sed rate, mild lupus flare - rash and alopecia. Discussed benlysta again and provided with information - pt not interested at this time. Other labs stable/improved.  Diagnosis/Important Hx: A lot of records unavailable as she was diagnosed at [facility] ([facility] Last SLEDAI 9 8/1/2024  Relevant serologies: MARSHA 1:2560 Speckled (2021), DsDNA+ up to 1:5120, last 1:320. SSA 2.56. Sm 2.03. Sm/RNP 5.99. Hx mild scattered leukopenias (usually ~3.8). C3/C4 always wnl.   Previous Therapy: CYC (2001 during initial LN flare)  Current Therapy: HCQ 200mg BID (should be daily per gemma Cardona, 10 tablets per week per last Dr. Cabrera note), MMF 1500mg BID, prednisone 2.5mg daily, baby aspirin  Imaging/Procedures:   Other notes: Belimumab was recommended but pt declined, if things were to get worse, she may consider it vs anifrolumab  PLAN  - Labs today and in 3  months  - Recommended vaccinations, patient still is not interested   Will continue to recomend  - Continue  BID, MMF 1500 BID   - Prednisone 2.5 qOD rather than daily  - Due for eye screening   Last 1/2023  - Provided pt with information on Benlysta & saphnelo, not willing to try any new medications at [facility] time  - Follow up in 3 months  - Refer to dermatology  - Hydrocortisone 2.5% BID on affected areas    Hx CVA  +B-2 Glycoprotein IgA  Pt has a hx of multiple strokes - per chart review hx of L PCA CVA in 2001 w/ right sided weakness and 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 Has hx of one miscarriage and 4 successful births. Has family hx of PE in a brother. IgA is less specific for APLA, so less likely related to this. Is taking daily ASA but no  Diagnosis/Important Hx:   Relevant serologies: B-2 Glycoprotein IgA >40 x2, all other APLA labs negative.  Previous Therapy:   Current Therapy: ASA daily  Imaging/Procedures: As above  Other notes:   PLAN  - Continue statin  - Continue daily aspirin      Rheumatology Health Maintenance  Vaccinations: Refuses vaccinations.   Medication monitoring:    HCQ - sees eye doctor every year, about due for another appointment (last visit 1/2023 - nl visual field, however they did recommend decrease of plaquenil dosing, unclear if pt is doing 8 tablets/week as she had discussed 200mg BID)  Osteoporosis:    DEXA - DEXA ordered, never done   Treatment -   Cardiovascular risk:    BP - good w/o medication   Lipids - Not taking statin, will get repeat LDL   Glucose - A1c 10/2023 5.4    This patient encounter was staffed and the plan was formulated with rheumatology attending Dr. Grossman.    Wanda Kennedy MD  Rheumatology Fellow, PGY-5

## 2025-07-10 ENCOUNTER — LAB VISIT (OUTPATIENT)
Dept: LAB | Facility: HOSPITAL | Age: 50
End: 2025-07-10
Payer: MEDICARE

## 2025-07-10 ENCOUNTER — OFFICE VISIT (OUTPATIENT)
Dept: RHEUMATOLOGY | Facility: CLINIC | Age: 50
End: 2025-07-10
Payer: MEDICARE

## 2025-07-10 VITALS
SYSTOLIC BLOOD PRESSURE: 123 MMHG | HEIGHT: 63 IN | HEART RATE: 84 BPM | DIASTOLIC BLOOD PRESSURE: 82 MMHG | WEIGHT: 119.5 LBS | BODY MASS INDEX: 21.17 KG/M2

## 2025-07-10 DIAGNOSIS — Z79.899 IMMUNOSUPPRESSION DUE TO DRUG THERAPY: ICD-10-CM

## 2025-07-10 DIAGNOSIS — D84.821 IMMUNOSUPPRESSION DUE TO DRUG THERAPY: ICD-10-CM

## 2025-07-10 DIAGNOSIS — M32.9 SYSTEMIC LUPUS ERYTHEMATOSUS, UNSPECIFIED SLE TYPE, UNSPECIFIED ORGAN INVOLVEMENT STATUS: Primary | ICD-10-CM

## 2025-07-10 DIAGNOSIS — L93.0 DISCOID LUPUS: ICD-10-CM

## 2025-07-10 DIAGNOSIS — M32.9 SYSTEMIC LUPUS ERYTHEMATOSUS, UNSPECIFIED SLE TYPE, UNSPECIFIED ORGAN INVOLVEMENT STATUS: ICD-10-CM

## 2025-07-10 LAB
ABSOLUTE EOSINOPHIL (OHS): 0.01 K/UL
ABSOLUTE MONOCYTE (OHS): 0.48 K/UL (ref 0.3–1)
ABSOLUTE NEUTROPHIL COUNT (OHS): 2.23 K/UL (ref 1.8–7.7)
ALBUMIN SERPL BCP-MCNC: 3.5 G/DL (ref 3.5–5.2)
ALP SERPL-CCNC: 91 UNIT/L (ref 40–150)
ALT SERPL W/O P-5'-P-CCNC: 12 UNIT/L (ref 10–44)
ANION GAP (OHS): 5 MMOL/L (ref 8–16)
AST SERPL-CCNC: 18 UNIT/L (ref 11–45)
BASOPHILS # BLD AUTO: 0.02 K/UL
BASOPHILS NFR BLD AUTO: 0.5 %
BILIRUB SERPL-MCNC: 0.3 MG/DL (ref 0.1–1)
BUN SERPL-MCNC: 12 MG/DL (ref 6–20)
C3 SERPL-MCNC: 79 MG/DL (ref 50–180)
C4 COMPLEMENT (OHS): 22 MG/DL (ref 11–44)
CALCIUM SERPL-MCNC: 9.1 MG/DL (ref 8.7–10.5)
CHLORIDE SERPL-SCNC: 109 MMOL/L (ref 95–110)
CO2 SERPL-SCNC: 27 MMOL/L (ref 23–29)
CREAT SERPL-MCNC: 0.8 MG/DL (ref 0.5–1.4)
CRP SERPL-MCNC: 1.3 MG/L
ERYTHROCYTE [DISTWIDTH] IN BLOOD BY AUTOMATED COUNT: 14.7 % (ref 11.5–14.5)
ERYTHROCYTE [SEDIMENTATION RATE] IN BLOOD BY PHOTOMETRIC METHOD: 56 MM/HR
GFR SERPLBLD CREATININE-BSD FMLA CKD-EPI: >60 ML/MIN/1.73/M2
GLUCOSE SERPL-MCNC: 84 MG/DL (ref 70–110)
HCT VFR BLD AUTO: 33.1 % (ref 37–48.5)
HGB BLD-MCNC: 10.2 GM/DL (ref 12–16)
IMM GRANULOCYTES # BLD AUTO: 0.01 K/UL (ref 0–0.04)
IMM GRANULOCYTES NFR BLD AUTO: 0.2 % (ref 0–0.5)
LYMPHOCYTES # BLD AUTO: 1.3 K/UL (ref 1–4.8)
MCH RBC QN AUTO: 25.4 PG (ref 27–31)
MCHC RBC AUTO-ENTMCNC: 30.8 G/DL (ref 32–36)
MCV RBC AUTO: 82 FL (ref 82–98)
NUCLEATED RBC (/100WBC) (OHS): 0 /100 WBC
PLATELET # BLD AUTO: 170 K/UL (ref 150–450)
PMV BLD AUTO: 10.6 FL (ref 9.2–12.9)
POTASSIUM SERPL-SCNC: 4.5 MMOL/L (ref 3.5–5.1)
PROT SERPL-MCNC: 8.9 GM/DL (ref 6–8.4)
RBC # BLD AUTO: 4.02 M/UL (ref 4–5.4)
RELATIVE EOSINOPHIL (OHS): 0.2 %
RELATIVE LYMPHOCYTE (OHS): 32.1 % (ref 18–48)
RELATIVE MONOCYTE (OHS): 11.9 % (ref 4–15)
RELATIVE NEUTROPHIL (OHS): 55.1 % (ref 38–73)
SODIUM SERPL-SCNC: 141 MMOL/L (ref 136–145)
WBC # BLD AUTO: 4.05 K/UL (ref 3.9–12.7)

## 2025-07-10 PROCEDURE — 82565 ASSAY OF CREATININE: CPT

## 2025-07-10 PROCEDURE — 85652 RBC SED RATE AUTOMATED: CPT

## 2025-07-10 PROCEDURE — 86160 COMPLEMENT ANTIGEN: CPT | Mod: 59

## 2025-07-10 PROCEDURE — 86160 COMPLEMENT ANTIGEN: CPT

## 2025-07-10 PROCEDURE — 86140 C-REACTIVE PROTEIN: CPT

## 2025-07-10 PROCEDURE — 99999 PR PBB SHADOW E&M-EST. PATIENT-LVL IV: CPT | Mod: PBBFAC,GC,, | Performed by: STUDENT IN AN ORGANIZED HEALTH CARE EDUCATION/TRAINING PROGRAM

## 2025-07-10 PROCEDURE — 36415 COLL VENOUS BLD VENIPUNCTURE: CPT

## 2025-07-10 PROCEDURE — 85025 COMPLETE CBC W/AUTO DIFF WBC: CPT

## 2025-07-10 RX ORDER — HYDROCORTISONE 25 MG/G
CREAM TOPICAL 2 TIMES DAILY
Qty: 28 G | Refills: 2 | Status: SHIPPED | OUTPATIENT
Start: 2025-07-10

## 2025-07-11 ENCOUNTER — PATIENT MESSAGE (OUTPATIENT)
Dept: DERMATOLOGY | Facility: CLINIC | Age: 50
End: 2025-07-11
Payer: MEDICARE

## 2025-08-19 ENCOUNTER — OFFICE VISIT (OUTPATIENT)
Dept: OPTOMETRY | Facility: CLINIC | Age: 50
End: 2025-08-19
Payer: MEDICARE

## 2025-08-19 ENCOUNTER — CLINICAL SUPPORT (OUTPATIENT)
Dept: OPHTHALMOLOGY | Facility: CLINIC | Age: 50
End: 2025-08-19
Payer: MEDICARE

## 2025-08-19 DIAGNOSIS — H53.461 HOMONYMOUS HEMIANOPIA, RIGHT: ICD-10-CM

## 2025-08-19 DIAGNOSIS — Z79.899 LONG-TERM USE OF PLAQUENIL: Primary | ICD-10-CM

## 2025-08-19 DIAGNOSIS — M32.19 OTHER SYSTEMIC LUPUS ERYTHEMATOSUS WITH OTHER ORGAN INVOLVEMENT: ICD-10-CM

## 2025-08-19 DIAGNOSIS — L93.0 DISCOID LUPUS: ICD-10-CM

## 2025-08-19 DIAGNOSIS — Z79.899 LONG-TERM USE OF PLAQUENIL: ICD-10-CM

## 2025-08-19 DIAGNOSIS — Z86.73 HISTORY OF STROKE: ICD-10-CM

## 2025-08-19 PROCEDURE — 99999 PR PBB SHADOW E&M-EST. PATIENT-LVL II: CPT | Mod: PBBFAC,,, | Performed by: OPTOMETRIST

## 2025-08-25 DIAGNOSIS — M32.19 OTHER SYSTEMIC LUPUS ERYTHEMATOSUS WITH OTHER ORGAN INVOLVEMENT: ICD-10-CM

## 2025-08-25 DIAGNOSIS — Z79.899 LONG-TERM USE OF PLAQUENIL: ICD-10-CM

## 2025-08-25 DIAGNOSIS — L93.0 DISCOID LUPUS: Primary | ICD-10-CM
